# Patient Record
Sex: MALE | Race: WHITE | NOT HISPANIC OR LATINO | Employment: OTHER | URBAN - METROPOLITAN AREA
[De-identification: names, ages, dates, MRNs, and addresses within clinical notes are randomized per-mention and may not be internally consistent; named-entity substitution may affect disease eponyms.]

---

## 2017-05-01 ENCOUNTER — HISTORICAL (OUTPATIENT)
Dept: LAB | Facility: HOSPITAL | Age: 67
End: 2017-05-01

## 2017-05-01 LAB
ABS NEUT (OLG): 4.74 X10(3)/MCL (ref 2.1–9.2)
ALBUMIN SERPL-MCNC: 4.3 GM/DL (ref 3.4–5)
ALBUMIN/GLOB SERPL: 1.7 {RATIO}
ALP SERPL-CCNC: 81 UNIT/L (ref 50–136)
ALT SERPL-CCNC: 32 UNIT/L (ref 12–78)
AST SERPL-CCNC: 21 UNIT/L (ref 15–37)
BASOPHILS # BLD AUTO: 0.1 X10(3)/MCL (ref 0–0.2)
BASOPHILS NFR BLD AUTO: 1 %
BILIRUB SERPL-MCNC: 0.6 MG/DL (ref 0.2–1)
BILIRUBIN DIRECT+TOT PNL SERPL-MCNC: 0.1 MG/DL (ref 0–0.2)
BILIRUBIN DIRECT+TOT PNL SERPL-MCNC: 0.5 MG/DL (ref 0–0.8)
BUN SERPL-MCNC: 11 MG/DL (ref 7–18)
CALCIUM SERPL-MCNC: 9.3 MG/DL (ref 8.5–10.1)
CHLORIDE SERPL-SCNC: 105 MMOL/L (ref 98–107)
CHOLEST SERPL-MCNC: 125 MG/DL (ref 0–200)
CHOLEST/HDLC SERPL: 2.4 {RATIO} (ref 0–5)
CO2 SERPL-SCNC: 28 MMOL/L (ref 21–32)
CREAT SERPL-MCNC: 1.06 MG/DL (ref 0.7–1.3)
EOSINOPHIL # BLD AUTO: 0 X10(3)/MCL (ref 0–0.9)
EOSINOPHIL NFR BLD AUTO: 0 %
ERYTHROCYTE [DISTWIDTH] IN BLOOD BY AUTOMATED COUNT: 13 % (ref 11.5–17)
GLOBULIN SER-MCNC: 2.5 GM/DL (ref 2.4–3.5)
GLUCOSE SERPL-MCNC: 92 MG/DL (ref 74–106)
HCT VFR BLD AUTO: 44.8 % (ref 42–52)
HDLC SERPL-MCNC: 52 MG/DL (ref 35–60)
HGB BLD-MCNC: 13.8 GM/DL (ref 14–18)
LDLC SERPL CALC-MCNC: 55 MG/DL (ref 0–129)
LYMPHOCYTES # BLD AUTO: 0.7 X10(3)/MCL (ref 0.6–4.6)
LYMPHOCYTES NFR BLD AUTO: 11 %
MCH RBC QN AUTO: 29.9 PG (ref 27–31)
MCHC RBC AUTO-ENTMCNC: 30.8 GM/DL (ref 33–36)
MCV RBC AUTO: 97 FL (ref 80–94)
MONOCYTES # BLD AUTO: 0.6 X10(3)/MCL (ref 0.1–1.3)
MONOCYTES NFR BLD AUTO: 10 %
NEUTROPHILS # BLD AUTO: 4.74 X10(3)/MCL (ref 2.1–9.2)
NEUTROPHILS NFR BLD AUTO: 77 %
PLATELET # BLD AUTO: 115 X10(3)/MCL (ref 130–400)
PMV BLD AUTO: 12.3 FL (ref 9.4–12.4)
POTASSIUM SERPL-SCNC: 4.6 MMOL/L (ref 3.5–5.1)
PROT SERPL-MCNC: 6.8 GM/DL (ref 6.4–8.2)
RBC # BLD AUTO: 4.62 X10(6)/MCL (ref 4.7–6.1)
SODIUM SERPL-SCNC: 139 MMOL/L (ref 136–145)
TRIGL SERPL-MCNC: 88 MG/DL (ref 30–150)
TSH SERPL-ACNC: 0.84 MIU/ML (ref 0.36–3.74)
VLDLC SERPL CALC-MCNC: 18 MG/DL
WBC # SPEC AUTO: 6.1 X10(3)/MCL (ref 4.5–11.5)

## 2017-11-09 ENCOUNTER — HISTORICAL (OUTPATIENT)
Dept: LAB | Facility: HOSPITAL | Age: 67
End: 2017-11-09

## 2017-11-09 LAB
ABS NEUT (OLG): 4.26 X10(3)/MCL (ref 2.1–9.2)
ALBUMIN SERPL-MCNC: 4.3 GM/DL (ref 3.4–5)
ALBUMIN/GLOB SERPL: 1.4 RATIO (ref 1.1–2)
ALP SERPL-CCNC: 76 UNIT/L (ref 50–136)
ALT SERPL-CCNC: 38 UNIT/L (ref 12–78)
APPEARANCE, UA: CLEAR
AST SERPL-CCNC: 23 UNIT/L (ref 15–37)
BACTERIA SPEC CULT: NORMAL /HPF
BASOPHILS # BLD AUTO: 0.1 X10(3)/MCL (ref 0–0.2)
BASOPHILS NFR BLD AUTO: 1 %
BILIRUB SERPL-MCNC: 0.6 MG/DL (ref 0.2–1)
BILIRUB UR QL STRIP: NEGATIVE
BILIRUBIN DIRECT+TOT PNL SERPL-MCNC: 0.2 MG/DL (ref 0–0.5)
BILIRUBIN DIRECT+TOT PNL SERPL-MCNC: 0.4 MG/DL (ref 0–0.8)
BUN SERPL-MCNC: 13 MG/DL (ref 7–18)
CALCIUM SERPL-MCNC: 9.3 MG/DL (ref 8.5–10.1)
CHLORIDE SERPL-SCNC: 98 MMOL/L (ref 98–107)
CHOLEST SERPL-MCNC: 123 MG/DL (ref 0–200)
CHOLEST/HDLC SERPL: 2 {RATIO} (ref 0–5)
CO2 SERPL-SCNC: 27 MMOL/L (ref 21–32)
COLOR UR: YELLOW
CREAT SERPL-MCNC: 1.08 MG/DL (ref 0.7–1.3)
EOSINOPHIL # BLD AUTO: 0 X10(3)/MCL (ref 0–0.9)
EOSINOPHIL NFR BLD AUTO: 1 %
ERYTHROCYTE [DISTWIDTH] IN BLOOD BY AUTOMATED COUNT: 12.9 % (ref 11.5–17)
GLOBULIN SER-MCNC: 3.1 GM/DL (ref 2.4–3.5)
GLUCOSE (UA): NEGATIVE
GLUCOSE SERPL-MCNC: 97 MG/DL (ref 74–106)
HCT VFR BLD AUTO: 43 % (ref 42–52)
HDLC SERPL-MCNC: 61 MG/DL (ref 35–60)
HGB BLD-MCNC: 13.9 GM/DL (ref 14–18)
HGB UR QL STRIP: NEGATIVE
KETONES UR QL STRIP: NEGATIVE
LDLC SERPL CALC-MCNC: 50 MG/DL (ref 0–129)
LEUKOCYTE ESTERASE UR QL STRIP: NEGATIVE
LYMPHOCYTES # BLD AUTO: 0.9 X10(3)/MCL (ref 0.6–4.6)
LYMPHOCYTES NFR BLD AUTO: 15 %
MCH RBC QN AUTO: 29.6 PG (ref 27–31)
MCHC RBC AUTO-ENTMCNC: 32.3 GM/DL (ref 33–36)
MCV RBC AUTO: 91.7 FL (ref 80–94)
MONOCYTES # BLD AUTO: 0.8 X10(3)/MCL (ref 0.1–1.3)
MONOCYTES NFR BLD AUTO: 13 %
NEUTROPHILS # BLD AUTO: 4.26 X10(3)/MCL (ref 2.1–9.2)
NEUTROPHILS NFR BLD AUTO: 70 %
NITRITE UR QL STRIP: NEGATIVE
PH UR STRIP: 6.5 [PH] (ref 5–9)
PLATELET # BLD AUTO: 264 X10(3)/MCL (ref 130–400)
PMV BLD AUTO: 10.6 FL (ref 9.4–12.4)
POTASSIUM SERPL-SCNC: 5 MMOL/L (ref 3.5–5.1)
PROT SERPL-MCNC: 7.4 GM/DL (ref 6.4–8.2)
PROT UR QL STRIP: NEGATIVE
PSA SERPL-MCNC: 1.36 NG/ML (ref 0–4)
RBC # BLD AUTO: 4.69 X10(6)/MCL (ref 4.7–6.1)
RBC #/AREA URNS HPF: NORMAL /[HPF]
SODIUM SERPL-SCNC: 134 MMOL/L (ref 136–145)
SP GR UR STRIP: 1.01 (ref 1–1.03)
SQUAMOUS EPITHELIAL, UA: NORMAL
TRIGL SERPL-MCNC: 59 MG/DL (ref 30–150)
TSH SERPL-ACNC: 1.41 MIU/ML (ref 0.36–3.74)
UA WBC MAN: NORMAL
UROBILINOGEN UR STRIP-ACNC: 0.2
VLDLC SERPL CALC-MCNC: 12 MG/DL
WBC # SPEC AUTO: 6.1 X10(3)/MCL (ref 4.5–11.5)
WBC #/AREA URNS HPF: NORMAL /[HPF]

## 2018-05-08 LAB
CHOLEST SERPL-MCNC: 138 MG/DL (ref 0–200)
CHOLEST/HDLC SERPL: 2.9 {RATIO} (ref 0–5)
HDLC SERPL-MCNC: 47 MG/DL (ref 35–60)
LDLC SERPL CALC-MCNC: 71 MG/DL (ref 0–129)
TRIGL SERPL-MCNC: 101 MG/DL (ref 30–150)
VLDLC SERPL CALC-MCNC: 20 MG/DL

## 2018-05-09 ENCOUNTER — HISTORICAL (OUTPATIENT)
Dept: LAB | Facility: HOSPITAL | Age: 68
End: 2018-05-09

## 2018-05-09 LAB
ALBUMIN SERPL-MCNC: 4.1 GM/DL (ref 3.4–5)
ALBUMIN/GLOB SERPL: 1.4 RATIO (ref 1.1–2)
ALP SERPL-CCNC: 76 UNIT/L (ref 50–136)
ALT SERPL-CCNC: 39 UNIT/L (ref 12–78)
AST SERPL-CCNC: 29 UNIT/L (ref 15–37)
BILIRUB SERPL-MCNC: 0.7 MG/DL (ref 0.2–1)
BILIRUBIN DIRECT+TOT PNL SERPL-MCNC: 0.1 MG/DL (ref 0–0.5)
BILIRUBIN DIRECT+TOT PNL SERPL-MCNC: 0.6 MG/DL (ref 0–0.8)
BUN SERPL-MCNC: 12 MG/DL (ref 7–18)
CALCIUM SERPL-MCNC: 9.5 MG/DL (ref 8.5–10.1)
CHLORIDE SERPL-SCNC: 103 MMOL/L (ref 98–107)
CO2 SERPL-SCNC: 28 MMOL/L (ref 21–32)
CREAT SERPL-MCNC: 1.12 MG/DL (ref 0.7–1.3)
EST. AVERAGE GLUCOSE BLD GHB EST-MCNC: 114 MG/DL
GLOBULIN SER-MCNC: 2.9 GM/DL (ref 2.4–3.5)
GLUCOSE SERPL-MCNC: 85 MG/DL (ref 74–106)
HBA1C MFR BLD: 5.6 % (ref 4.2–6.3)
POTASSIUM SERPL-SCNC: 4.4 MMOL/L (ref 3.5–5.1)
PROT SERPL-MCNC: 7 GM/DL (ref 6.4–8.2)
SODIUM SERPL-SCNC: 137 MMOL/L (ref 136–145)
TSH SERPL-ACNC: 1.12 MIU/L (ref 0.36–3.74)

## 2018-05-28 ENCOUNTER — HISTORICAL (OUTPATIENT)
Dept: ADMINISTRATIVE | Facility: HOSPITAL | Age: 68
End: 2018-05-28

## 2018-05-28 LAB — RAPID GROUP A STREP (OHS): NEGATIVE

## 2018-11-16 ENCOUNTER — HISTORICAL (OUTPATIENT)
Dept: LAB | Facility: HOSPITAL | Age: 68
End: 2018-11-16

## 2018-11-16 LAB
ABS NEUT (OLG): 3.37 X10(3)/MCL (ref 2.1–9.2)
ALBUMIN SERPL-MCNC: 4.1 GM/DL (ref 3.4–5)
ALBUMIN/GLOB SERPL: 1.5 {RATIO}
ALP SERPL-CCNC: 85 UNIT/L (ref 50–136)
ALT SERPL-CCNC: 47 UNIT/L (ref 12–78)
APPEARANCE, UA: CLEAR
AST SERPL-CCNC: 34 UNIT/L (ref 15–37)
BACTERIA SPEC CULT: NORMAL /HPF
BASOPHILS # BLD AUTO: 0.1 X10(3)/MCL (ref 0–0.2)
BASOPHILS NFR BLD AUTO: 1 %
BILIRUB SERPL-MCNC: 1 MG/DL (ref 0.2–1)
BILIRUB UR QL STRIP: NEGATIVE
BILIRUBIN DIRECT+TOT PNL SERPL-MCNC: 0.2 MG/DL (ref 0–0.2)
BILIRUBIN DIRECT+TOT PNL SERPL-MCNC: 0.8 MG/DL (ref 0–0.8)
BUN SERPL-MCNC: 10 MG/DL (ref 7–18)
CALCIUM SERPL-MCNC: 8.9 MG/DL (ref 8.5–10.1)
CHLORIDE SERPL-SCNC: 97 MMOL/L (ref 98–107)
CHOLEST SERPL-MCNC: 138 MG/DL (ref 0–200)
CHOLEST/HDLC SERPL: 2.9 {RATIO} (ref 0–5)
CO2 SERPL-SCNC: 27 MMOL/L (ref 21–32)
COLOR UR: YELLOW
CREAT SERPL-MCNC: 1.08 MG/DL (ref 0.7–1.3)
EOSINOPHIL # BLD AUTO: 0 X10(3)/MCL (ref 0–0.9)
EOSINOPHIL NFR BLD AUTO: 1 %
ERYTHROCYTE [DISTWIDTH] IN BLOOD BY AUTOMATED COUNT: 12.4 % (ref 11.5–17)
GLOBULIN SER-MCNC: 2.8 GM/DL (ref 2.4–3.5)
GLUCOSE (UA): NEGATIVE
GLUCOSE SERPL-MCNC: 86 MG/DL (ref 74–106)
HCT VFR BLD AUTO: 43.5 % (ref 42–52)
HDLC SERPL-MCNC: 48 MG/DL (ref 35–60)
HGB BLD-MCNC: 13.9 GM/DL (ref 14–18)
HGB UR QL STRIP: NEGATIVE
KETONES UR QL STRIP: NEGATIVE
LDLC SERPL CALC-MCNC: 74 MG/DL (ref 0–129)
LEUKOCYTE ESTERASE UR QL STRIP: NEGATIVE
LYMPHOCYTES # BLD AUTO: 1 X10(3)/MCL (ref 0.6–4.6)
LYMPHOCYTES NFR BLD AUTO: 20 %
MCH RBC QN AUTO: 29.1 PG (ref 27–31)
MCHC RBC AUTO-ENTMCNC: 32 GM/DL (ref 33–36)
MCV RBC AUTO: 91 FL (ref 80–94)
MONOCYTES # BLD AUTO: 0.8 X10(3)/MCL (ref 0.1–1.3)
MONOCYTES NFR BLD AUTO: 14 %
NEUTROPHILS # BLD AUTO: 3.37 X10(3)/MCL (ref 2.1–9.2)
NEUTROPHILS NFR BLD AUTO: 64 %
NITRITE UR QL STRIP: NEGATIVE
PH UR STRIP: 7.5 [PH] (ref 5–9)
PLATELET # BLD AUTO: 272 X10(3)/MCL (ref 130–400)
PMV BLD AUTO: 10.2 FL (ref 9.4–12.4)
POTASSIUM SERPL-SCNC: 4.5 MMOL/L (ref 3.5–5.1)
PROT SERPL-MCNC: 6.9 GM/DL (ref 6.4–8.2)
PROT UR QL STRIP: NEGATIVE
PSA SERPL-MCNC: 1.75 NG/ML (ref 0–4)
RBC # BLD AUTO: 4.78 X10(6)/MCL (ref 4.7–6.1)
RBC #/AREA URNS HPF: NORMAL /[HPF]
SODIUM SERPL-SCNC: 131 MMOL/L (ref 136–145)
SP GR UR STRIP: 1 (ref 1–1.03)
SQUAMOUS EPITHELIAL, UA: NORMAL
T PALLIDUM AB SER QL: NORMAL
TRIGL SERPL-MCNC: 80 MG/DL (ref 30–150)
TSH SERPL-ACNC: 1.55 MIU/L (ref 0.36–3.74)
UROBILINOGEN UR STRIP-ACNC: 0.2
VIT B12 SERPL-MCNC: 912 PG/ML (ref 193–986)
VLDLC SERPL CALC-MCNC: 16 MG/DL
WBC # SPEC AUTO: 5.3 X10(3)/MCL (ref 4.5–11.5)
WBC #/AREA URNS HPF: NORMAL /HPF

## 2019-05-17 ENCOUNTER — HISTORICAL (OUTPATIENT)
Dept: LAB | Facility: HOSPITAL | Age: 69
End: 2019-05-17

## 2019-05-17 LAB
ALBUMIN SERPL-MCNC: 4.2 GM/DL (ref 3.4–5)
ALBUMIN/GLOB SERPL: 1.4 RATIO (ref 1.1–2)
ALP SERPL-CCNC: 91 UNIT/L (ref 50–136)
ALT SERPL-CCNC: 35 UNIT/L (ref 12–78)
AST SERPL-CCNC: 24 UNIT/L (ref 15–37)
BILIRUB SERPL-MCNC: 0.8 MG/DL (ref 0.2–1)
BILIRUBIN DIRECT+TOT PNL SERPL-MCNC: 0.2 MG/DL (ref 0–0.5)
BILIRUBIN DIRECT+TOT PNL SERPL-MCNC: 0.6 MG/DL (ref 0–0.8)
BUN SERPL-MCNC: 12 MG/DL (ref 7–18)
CALCIUM SERPL-MCNC: 9.2 MG/DL (ref 8.5–10.1)
CHLORIDE SERPL-SCNC: 98 MMOL/L (ref 98–107)
CHOLEST SERPL-MCNC: 128 MG/DL (ref 0–200)
CHOLEST/HDLC SERPL: 2.6 {RATIO} (ref 0–5)
CO2 SERPL-SCNC: 29 MMOL/L (ref 21–32)
CREAT SERPL-MCNC: 1.01 MG/DL (ref 0.7–1.3)
GLOBULIN SER-MCNC: 2.9 GM/DL (ref 2.4–3.5)
GLUCOSE SERPL-MCNC: 95 MG/DL (ref 74–106)
HDLC SERPL-MCNC: 49 MG/DL (ref 35–60)
LDLC SERPL CALC-MCNC: 65 MG/DL (ref 0–129)
POTASSIUM SERPL-SCNC: 4.4 MMOL/L (ref 3.5–5.1)
PROT SERPL-MCNC: 7.1 GM/DL (ref 6.4–8.2)
SODIUM SERPL-SCNC: 132 MMOL/L (ref 136–145)
TRIGL SERPL-MCNC: 69 MG/DL (ref 30–150)
TSH SERPL-ACNC: 1.58 MIU/L (ref 0.36–3.74)
VLDLC SERPL CALC-MCNC: 14 MG/DL

## 2019-11-20 ENCOUNTER — HISTORICAL (OUTPATIENT)
Dept: LAB | Facility: HOSPITAL | Age: 69
End: 2019-11-20

## 2019-11-20 LAB
ABS NEUT (OLG): 3.72 X10(3)/MCL (ref 2.1–9.2)
ALBUMIN SERPL-MCNC: 4.2 GM/DL (ref 3.4–5)
ALBUMIN/GLOB SERPL: 1.6 {RATIO}
ALP SERPL-CCNC: 79 UNIT/L (ref 50–136)
ALT SERPL-CCNC: 33 UNIT/L (ref 12–78)
APPEARANCE, UA: CLEAR
AST SERPL-CCNC: 19 UNIT/L (ref 15–37)
BACTERIA SPEC CULT: NORMAL /HPF
BASOPHILS # BLD AUTO: 0.1 X10(3)/MCL (ref 0–0.2)
BASOPHILS NFR BLD AUTO: 1 %
BILIRUB SERPL-MCNC: 0.8 MG/DL (ref 0.2–1)
BILIRUB UR QL STRIP: NEGATIVE
BILIRUBIN DIRECT+TOT PNL SERPL-MCNC: 0.1 MG/DL (ref 0–0.2)
BILIRUBIN DIRECT+TOT PNL SERPL-MCNC: 0.7 MG/DL (ref 0–0.8)
BUN SERPL-MCNC: 15 MG/DL (ref 7–18)
CALCIUM SERPL-MCNC: 9.6 MG/DL (ref 8.5–10.1)
CHLORIDE SERPL-SCNC: 103 MMOL/L (ref 98–107)
CHOLEST SERPL-MCNC: 145 MG/DL (ref 0–200)
CHOLEST/HDLC SERPL: 2.3 {RATIO} (ref 0–5)
CO2 SERPL-SCNC: 28 MMOL/L (ref 21–32)
COLOR UR: YELLOW
CREAT SERPL-MCNC: 1.06 MG/DL (ref 0.7–1.3)
EOSINOPHIL # BLD AUTO: 0 X10(3)/MCL (ref 0–0.9)
EOSINOPHIL NFR BLD AUTO: 0 %
ERYTHROCYTE [DISTWIDTH] IN BLOOD BY AUTOMATED COUNT: 12.8 % (ref 11.5–17)
GLOBULIN SER-MCNC: 2.6 GM/DL (ref 2.4–3.5)
GLUCOSE (UA): NEGATIVE
GLUCOSE SERPL-MCNC: 102 MG/DL (ref 74–106)
HCT VFR BLD AUTO: 46.8 % (ref 42–52)
HDLC SERPL-MCNC: 62 MG/DL (ref 35–60)
HGB BLD-MCNC: 14.4 GM/DL (ref 14–18)
HGB UR QL STRIP: NEGATIVE
KETONES UR QL STRIP: NEGATIVE
LDLC SERPL CALC-MCNC: 71 MG/DL (ref 0–129)
LEUKOCYTE ESTERASE UR QL STRIP: NEGATIVE
LYMPHOCYTES # BLD AUTO: 0.9 X10(3)/MCL (ref 0.6–4.6)
LYMPHOCYTES NFR BLD AUTO: 16 %
MCH RBC QN AUTO: 28.6 PG (ref 27–31)
MCHC RBC AUTO-ENTMCNC: 30.8 GM/DL (ref 33–36)
MCV RBC AUTO: 92.9 FL (ref 80–94)
MONOCYTES # BLD AUTO: 0.7 X10(3)/MCL (ref 0.1–1.3)
MONOCYTES NFR BLD AUTO: 13 %
NEUTROPHILS # BLD AUTO: 3.72 X10(3)/MCL (ref 2.1–9.2)
NEUTROPHILS NFR BLD AUTO: 69 %
NITRITE UR QL STRIP: NEGATIVE
PH UR STRIP: 6 [PH] (ref 5–9)
PLATELET # BLD AUTO: 290 X10(3)/MCL (ref 130–400)
PMV BLD AUTO: 9.8 FL (ref 9.4–12.4)
POTASSIUM SERPL-SCNC: 4.9 MMOL/L (ref 3.5–5.1)
PROT SERPL-MCNC: 6.8 GM/DL (ref 6.4–8.2)
PROT UR QL STRIP: NEGATIVE
PSA SERPL-MCNC: 2.07 NG/ML (ref 0–4)
RBC # BLD AUTO: 5.04 X10(6)/MCL (ref 4.7–6.1)
RBC #/AREA URNS HPF: NORMAL /[HPF]
SODIUM SERPL-SCNC: 136 MMOL/L (ref 136–145)
SP GR UR STRIP: 1.01 (ref 1–1.03)
SQUAMOUS EPITHELIAL, UA: NORMAL
TRIGL SERPL-MCNC: 60 MG/DL (ref 30–150)
TSH SERPL-ACNC: 2.08 MIU/L (ref 0.36–3.74)
UROBILINOGEN UR STRIP-ACNC: 0.2
VLDLC SERPL CALC-MCNC: 12 MG/DL
WBC # SPEC AUTO: 5.4 X10(3)/MCL (ref 4.5–11.5)
WBC #/AREA URNS HPF: NORMAL /HPF

## 2020-11-17 ENCOUNTER — HISTORICAL (OUTPATIENT)
Dept: ADMINISTRATIVE | Facility: HOSPITAL | Age: 70
End: 2020-11-17

## 2020-11-17 LAB
ABS NEUT (OLG): 2.51 X10(3)/MCL (ref 2.1–9.2)
ALBUMIN SERPL-MCNC: 4 GM/DL (ref 3.4–4.8)
ALBUMIN/GLOB SERPL: 1.7 RATIO (ref 1.1–2)
ALP SERPL-CCNC: 78 UNIT/L (ref 40–150)
ALT SERPL-CCNC: 15 UNIT/L (ref 0–55)
APPEARANCE, UA: CLEAR
AST SERPL-CCNC: 17 UNIT/L (ref 5–34)
BACTERIA SPEC CULT: NORMAL /HPF
BASOPHILS # BLD AUTO: 0 X10(3)/MCL (ref 0–0.2)
BASOPHILS NFR BLD AUTO: 1 %
BILIRUB SERPL-MCNC: 0.4 MG/DL
BILIRUB UR QL STRIP: NEGATIVE
BILIRUBIN DIRECT+TOT PNL SERPL-MCNC: 0.2 MG/DL (ref 0–0.5)
BILIRUBIN DIRECT+TOT PNL SERPL-MCNC: 0.2 MG/DL (ref 0–0.8)
BUN SERPL-MCNC: 13.1 MG/DL (ref 8.4–25.7)
CALCIUM SERPL-MCNC: 9.1 MG/DL (ref 8.8–10)
CHLORIDE SERPL-SCNC: 104 MMOL/L (ref 98–107)
CHOLEST SERPL-MCNC: 129 MG/DL
CHOLEST/HDLC SERPL: 3 {RATIO} (ref 0–5)
CO2 SERPL-SCNC: 26 MMOL/L (ref 23–31)
COLOR UR: YELLOW
CREAT SERPL-MCNC: 1.02 MG/DL (ref 0.73–1.18)
EOSINOPHIL # BLD AUTO: 0 X10(3)/MCL (ref 0–0.9)
EOSINOPHIL NFR BLD AUTO: 1 %
ERYTHROCYTE [DISTWIDTH] IN BLOOD BY AUTOMATED COUNT: 12.3 % (ref 11.5–17)
GLOBULIN SER-MCNC: 2.4 GM/DL (ref 2.4–3.5)
GLUCOSE (UA): NEGATIVE
GLUCOSE SERPL-MCNC: 109 MG/DL (ref 82–115)
HCT VFR BLD AUTO: 42.4 % (ref 42–52)
HCV AB SERPL QL IA: NONREACTIVE
HDLC SERPL-MCNC: 49 MG/DL (ref 35–60)
HGB BLD-MCNC: 13.5 GM/DL (ref 14–18)
HGB UR QL STRIP: NEGATIVE
KETONES UR QL STRIP: NEGATIVE
LDLC SERPL CALC-MCNC: 71 MG/DL (ref 50–140)
LEUKOCYTE ESTERASE UR QL STRIP: NEGATIVE
LYMPHOCYTES # BLD AUTO: 0.6 X10(3)/MCL (ref 0.6–4.6)
LYMPHOCYTES NFR BLD AUTO: 16 %
MCH RBC QN AUTO: 28.7 PG (ref 27–31)
MCHC RBC AUTO-ENTMCNC: 31.8 GM/DL (ref 33–36)
MCV RBC AUTO: 90.2 FL (ref 80–94)
MONOCYTES # BLD AUTO: 0.6 X10(3)/MCL (ref 0.1–1.3)
MONOCYTES NFR BLD AUTO: 15 %
NEUTROPHILS # BLD AUTO: 2.51 X10(3)/MCL (ref 2.1–9.2)
NEUTROPHILS NFR BLD AUTO: 67 %
NITRITE UR QL STRIP: NEGATIVE
PH UR STRIP: 5.5 [PH] (ref 5–9)
PLATELET # BLD AUTO: 294 X10(3)/MCL (ref 130–400)
PMV BLD AUTO: 10 FL (ref 9.4–12.4)
POTASSIUM SERPL-SCNC: 4.9 MMOL/L (ref 3.5–5.1)
PROT SERPL-MCNC: 6.4 GM/DL (ref 5.8–7.6)
PROT UR QL STRIP: NEGATIVE
PSA SERPL-MCNC: 1.52 NG/ML
RBC # BLD AUTO: 4.7 X10(6)/MCL (ref 4.7–6.1)
RBC #/AREA URNS HPF: NORMAL /[HPF]
SODIUM SERPL-SCNC: 137 MMOL/L (ref 136–145)
SP GR UR STRIP: 1.02 (ref 1–1.03)
SQUAMOUS EPITHELIAL, UA: NORMAL
TRIGL SERPL-MCNC: 46 MG/DL (ref 34–140)
TSH SERPL-ACNC: 0.97 UIU/ML (ref 0.35–4.94)
UROBILINOGEN UR STRIP-ACNC: 0.2
VLDLC SERPL CALC-MCNC: 9 MG/DL
WBC # SPEC AUTO: 3.8 X10(3)/MCL (ref 4.5–11.5)
WBC #/AREA URNS HPF: NORMAL /HPF

## 2022-04-10 ENCOUNTER — HISTORICAL (OUTPATIENT)
Dept: ADMINISTRATIVE | Facility: HOSPITAL | Age: 72
End: 2022-04-10

## 2022-04-25 VITALS
OXYGEN SATURATION: 98 % | WEIGHT: 152.13 LBS | SYSTOLIC BLOOD PRESSURE: 170 MMHG | HEIGHT: 66 IN | DIASTOLIC BLOOD PRESSURE: 110 MMHG | BODY MASS INDEX: 24.45 KG/M2

## 2022-09-22 ENCOUNTER — TELEPHONE (OUTPATIENT)
Dept: NEUROLOGY | Facility: CLINIC | Age: 72
End: 2022-09-22
Payer: MEDICARE

## 2022-09-22 NOTE — TELEPHONE ENCOUNTER
----- Message from Sharon Gordon sent at 9/22/2022 10:20 AM CDT -----  Contact: @ [phone]  Pt daughter is calling to make an appointment for memory disorders no date available please call and adv @ [phone]

## 2022-09-26 ENCOUNTER — PATIENT OUTREACH (OUTPATIENT)
Dept: ADMINISTRATIVE | Facility: HOSPITAL | Age: 72
End: 2022-09-26
Payer: MEDICARE

## 2022-09-26 NOTE — PROGRESS NOTES
[facility] CMS chart audits-PCP VISIT/ANNUAL WELLNESS VISIT. Chart review completed for HM test overdue (mammograms, Colonoscopies, pap smears, DM labs, and/or EYE EXAMs)      Care Everywhere and media, updates requested and reviewed.      RE:  Patient needs PCP Visit-2022    Attempted to call patient. No answer, could not leave voice mail.

## 2022-10-03 ENCOUNTER — TELEPHONE (OUTPATIENT)
Dept: NEUROLOGY | Facility: CLINIC | Age: 72
End: 2022-10-03
Payer: MEDICARE

## 2022-10-06 ENCOUNTER — TELEPHONE (OUTPATIENT)
Dept: NEUROLOGY | Facility: CLINIC | Age: 72
End: 2022-10-06
Payer: MEDICARE

## 2022-10-06 NOTE — TELEPHONE ENCOUNTER
----- Message from Marisol Katz sent at 10/6/2022  8:17 AM CDT -----  Regarding: appt-returning missed call from [facility]  Contact: Laura (daughter) @ [phone]  Caller, Laura (daughter) is returning a missed call from [facility] in the office. Daughter states that pt is desperate for an appt and would like to have pt seen very soon. Please call. Thanks.

## 2022-10-23 ENCOUNTER — HOSPITAL ENCOUNTER (EMERGENCY)
Facility: HOSPITAL | Age: 72
Discharge: PSYCHIATRIC OR SUBSTANCE USE FACILITY | End: 2022-10-23
Attending: EMERGENCY MEDICINE
Payer: MEDICARE

## 2022-10-23 VITALS
RESPIRATION RATE: 18 BRPM | HEART RATE: 51 BPM | OXYGEN SATURATION: 100 % | BODY MASS INDEX: 21.97 KG/M2 | WEIGHT: 140 LBS | DIASTOLIC BLOOD PRESSURE: 68 MMHG | HEIGHT: 67 IN | SYSTOLIC BLOOD PRESSURE: 156 MMHG | TEMPERATURE: 99 F

## 2022-10-23 DIAGNOSIS — R00.1 BRADYCARDIA: ICD-10-CM

## 2022-10-23 DIAGNOSIS — F03.918 AGGRESSIVE BEHAVIOR DUE TO DEMENTIA: Primary | ICD-10-CM

## 2022-10-23 LAB
ALBUMIN SERPL BCP-MCNC: 3.5 G/DL (ref 3.5–5.2)
ALP SERPL-CCNC: 86 U/L (ref 55–135)
ALT SERPL W/O P-5'-P-CCNC: 28 U/L (ref 10–44)
AMPHET+METHAMPHET UR QL: NEGATIVE
ANION GAP SERPL CALC-SCNC: 5 MMOL/L (ref 8–16)
APAP SERPL-MCNC: <2 UG/ML (ref 10–20)
AST SERPL-CCNC: 20 U/L (ref 10–40)
BACTERIA #/AREA URNS HPF: NEGATIVE /HPF
BARBITURATES UR QL SCN>200 NG/ML: NEGATIVE
BASOPHILS # BLD AUTO: 0.06 K/UL (ref 0–0.2)
BASOPHILS NFR BLD: 1.1 % (ref 0–1.9)
BENZODIAZ UR QL SCN>200 NG/ML: ABNORMAL
BILIRUB SERPL-MCNC: 0.3 MG/DL (ref 0.1–1)
BILIRUB UR QL STRIP: NEGATIVE
BUN SERPL-MCNC: 15 MG/DL (ref 8–23)
BZE UR QL SCN: NEGATIVE
CALCIUM SERPL-MCNC: 9 MG/DL (ref 8.7–10.5)
CANNABINOIDS UR QL SCN: NEGATIVE
CHLORIDE SERPL-SCNC: 109 MMOL/L (ref 95–110)
CLARITY UR: ABNORMAL
CO2 SERPL-SCNC: 28 MMOL/L (ref 23–29)
COLOR UR: YELLOW
CREAT SERPL-MCNC: 1 MG/DL (ref 0.5–1.4)
CREAT UR-MCNC: 171 MG/DL (ref 23–375)
CTP QC/QA: YES
DIFFERENTIAL METHOD: ABNORMAL
EOSINOPHIL # BLD AUTO: 0.1 K/UL (ref 0–0.5)
EOSINOPHIL NFR BLD: 0.9 % (ref 0–8)
ERYTHROCYTE [DISTWIDTH] IN BLOOD BY AUTOMATED COUNT: 12.4 % (ref 11.5–14.5)
EST. GFR  (NO RACE VARIABLE): >60 ML/MIN/1.73 M^2
ETHANOL SERPL-MCNC: <3 MG/DL
GLUCOSE SERPL-MCNC: 101 MG/DL (ref 70–110)
GLUCOSE UR QL STRIP: NEGATIVE
HCT VFR BLD AUTO: 35.5 % (ref 40–54)
HGB BLD-MCNC: 12 G/DL (ref 14–18)
HGB UR QL STRIP: NEGATIVE
HYALINE CASTS #/AREA URNS LPF: 1.9 /LPF
IMM GRANULOCYTES # BLD AUTO: 0.01 K/UL (ref 0–0.04)
IMM GRANULOCYTES NFR BLD AUTO: 0.2 % (ref 0–0.5)
KETONES UR QL STRIP: ABNORMAL
LEUKOCYTE ESTERASE UR QL STRIP: NEGATIVE
LYMPHOCYTES # BLD AUTO: 0.8 K/UL (ref 1–4.8)
LYMPHOCYTES NFR BLD: 15.5 % (ref 18–48)
MCH RBC QN AUTO: 30.1 PG (ref 27–31)
MCHC RBC AUTO-ENTMCNC: 33.8 G/DL (ref 32–36)
MCV RBC AUTO: 89 FL (ref 82–98)
METHADONE UR QL SCN>300 NG/ML: NEGATIVE
MICROSCOPIC COMMENT: ABNORMAL
MONOCYTES # BLD AUTO: 0.6 K/UL (ref 0.3–1)
MONOCYTES NFR BLD: 10.5 % (ref 4–15)
NEUTROPHILS # BLD AUTO: 3.8 K/UL (ref 1.8–7.7)
NEUTROPHILS NFR BLD: 71.8 % (ref 38–73)
NITRITE UR QL STRIP: NEGATIVE
NRBC BLD-RTO: 0 /100 WBC
OPIATES UR QL SCN: NEGATIVE
PCP UR QL SCN>25 NG/ML: NEGATIVE
PH UR STRIP: 7 [PH] (ref 5–8)
PLATELET # BLD AUTO: 250 K/UL (ref 150–450)
PMV BLD AUTO: 9.5 FL (ref 9.2–12.9)
POTASSIUM SERPL-SCNC: 4.2 MMOL/L (ref 3.5–5.1)
PROT SERPL-MCNC: 6.1 G/DL (ref 6–8.4)
PROT UR QL STRIP: NEGATIVE
RBC # BLD AUTO: 3.99 M/UL (ref 4.6–6.2)
RBC #/AREA URNS HPF: 2 /HPF (ref 0–4)
SARS-COV-2 RDRP RESP QL NAA+PROBE: NEGATIVE
SODIUM SERPL-SCNC: 142 MMOL/L (ref 136–145)
SP GR UR STRIP: 1.02 (ref 1–1.03)
SQUAMOUS #/AREA URNS HPF: 1 /HPF
TOXICOLOGY INFORMATION: ABNORMAL
TSH SERPL DL<=0.005 MIU/L-ACNC: 0.98 UIU/ML (ref 0.4–4)
URN SPEC COLLECT METH UR: ABNORMAL
UROBILINOGEN UR STRIP-ACNC: 1 EU/DL
WBC # BLD AUTO: 5.34 K/UL (ref 3.9–12.7)
WBC #/AREA URNS HPF: 0 /HPF (ref 0–5)

## 2022-10-23 PROCEDURE — 85025 COMPLETE CBC W/AUTO DIFF WBC: CPT | Performed by: EMERGENCY MEDICINE

## 2022-10-23 PROCEDURE — 96372 THER/PROPH/DIAG INJ SC/IM: CPT | Performed by: EMERGENCY MEDICINE

## 2022-10-23 PROCEDURE — 80143 DRUG ASSAY ACETAMINOPHEN: CPT | Performed by: EMERGENCY MEDICINE

## 2022-10-23 PROCEDURE — 84443 ASSAY THYROID STIM HORMONE: CPT | Performed by: EMERGENCY MEDICINE

## 2022-10-23 PROCEDURE — 63600175 PHARM REV CODE 636 W HCPCS: Performed by: EMERGENCY MEDICINE

## 2022-10-23 PROCEDURE — 93010 ELECTROCARDIOGRAM REPORT: CPT | Mod: ,,, | Performed by: INTERNAL MEDICINE

## 2022-10-23 PROCEDURE — 93005 ELECTROCARDIOGRAM TRACING: CPT

## 2022-10-23 PROCEDURE — 80307 DRUG TEST PRSMV CHEM ANLYZR: CPT | Performed by: EMERGENCY MEDICINE

## 2022-10-23 PROCEDURE — 82077 ASSAY SPEC XCP UR&BREATH IA: CPT | Performed by: EMERGENCY MEDICINE

## 2022-10-23 PROCEDURE — 93010 EKG 12-LEAD: ICD-10-PCS | Mod: ,,, | Performed by: INTERNAL MEDICINE

## 2022-10-23 PROCEDURE — 81000 URINALYSIS NONAUTO W/SCOPE: CPT | Mod: 59 | Performed by: EMERGENCY MEDICINE

## 2022-10-23 PROCEDURE — 36415 COLL VENOUS BLD VENIPUNCTURE: CPT | Performed by: EMERGENCY MEDICINE

## 2022-10-23 PROCEDURE — 87635 SARS-COV-2 COVID-19 AMP PRB: CPT | Performed by: EMERGENCY MEDICINE

## 2022-10-23 PROCEDURE — 99285 EMERGENCY DEPT VISIT HI MDM: CPT

## 2022-10-23 PROCEDURE — 80053 COMPREHEN METABOLIC PANEL: CPT | Performed by: EMERGENCY MEDICINE

## 2022-10-23 PROCEDURE — 25000003 PHARM REV CODE 250: Performed by: EMERGENCY MEDICINE

## 2022-10-23 RX ORDER — ALPRAZOLAM 0.5 MG/1
TABLET, EXTENDED RELEASE ORAL DAILY
Status: ON HOLD | COMMUNITY
End: 2022-11-11

## 2022-10-23 RX ORDER — HALOPERIDOL 5 MG/ML
5 INJECTION INTRAMUSCULAR
Status: COMPLETED | OUTPATIENT
Start: 2022-10-23 | End: 2022-10-23

## 2022-10-23 RX ORDER — AMLODIPINE BESYLATE 10 MG/1
10 TABLET ORAL DAILY
Status: ON HOLD | COMMUNITY
End: 2022-11-11

## 2022-10-23 RX ORDER — LEVOTHYROXINE SODIUM 88 UG/1
88 TABLET ORAL
Status: ON HOLD | COMMUNITY
End: 2022-11-11

## 2022-10-23 RX ORDER — AMLODIPINE BESYLATE 10 MG/1
10 TABLET ORAL
Status: COMPLETED | OUTPATIENT
Start: 2022-10-23 | End: 2022-10-23

## 2022-10-23 RX ORDER — DIPHENHYDRAMINE HYDROCHLORIDE 50 MG/ML
25 INJECTION INTRAMUSCULAR; INTRAVENOUS
Status: COMPLETED | OUTPATIENT
Start: 2022-10-23 | End: 2022-10-23

## 2022-10-23 RX ORDER — FLUOXETINE HYDROCHLORIDE 40 MG/1
40 CAPSULE ORAL DAILY
Status: ON HOLD | COMMUNITY
End: 2022-11-11

## 2022-10-23 RX ORDER — DIAZEPAM 10 MG/2ML
5 INJECTION INTRAMUSCULAR
Status: COMPLETED | OUTPATIENT
Start: 2022-10-23 | End: 2022-10-23

## 2022-10-23 RX ORDER — ATORVASTATIN CALCIUM 40 MG/1
40 TABLET, FILM COATED ORAL DAILY
Status: ON HOLD | COMMUNITY
End: 2022-11-11

## 2022-10-23 RX ORDER — OXCARBAZEPINE 300 MG/1
150 TABLET, FILM COATED ORAL 2 TIMES DAILY
Status: ON HOLD | COMMUNITY
End: 2022-11-11

## 2022-10-23 RX ORDER — ASPIRIN 81 MG/1
81 TABLET ORAL DAILY
Status: ON HOLD | COMMUNITY
End: 2022-11-11

## 2022-10-23 RX ORDER — CHOLECALCIFEROL (VITAMIN D3) 25 MCG
1000 TABLET ORAL DAILY
Status: ON HOLD | COMMUNITY
End: 2022-11-11

## 2022-10-23 RX ORDER — OLANZAPINE 2.5 MG/1
2.5 TABLET ORAL NIGHTLY
Status: ON HOLD | COMMUNITY
End: 2022-11-03 | Stop reason: SINTOL

## 2022-10-23 RX ADMIN — DIAZEPAM 5 MG: 10 INJECTION, SOLUTION INTRAMUSCULAR; INTRAVENOUS at 02:10

## 2022-10-23 RX ADMIN — AMLODIPINE BESYLATE 10 MG: 10 TABLET ORAL at 06:10

## 2022-10-23 RX ADMIN — DIAZEPAM 5 MG: 10 INJECTION, SOLUTION INTRAMUSCULAR; INTRAVENOUS at 08:10

## 2022-10-23 RX ADMIN — DIPHENHYDRAMINE HYDROCHLORIDE 25 MG: 50 INJECTION, SOLUTION INTRAMUSCULAR; INTRAVENOUS at 02:10

## 2022-10-23 RX ADMIN — HALOPERIDOL LACTATE 5 MG: 5 INJECTION, SOLUTION INTRAMUSCULAR at 02:10

## 2022-10-23 RX ADMIN — HALOPERIDOL LACTATE 5 MG: 5 INJECTION, SOLUTION INTRAMUSCULAR at 08:10

## 2022-10-23 NOTE — ED NOTES
Wife states that for about 3 months patient has been declining patient needs help with bathing and eating at times patient can communicate but often times does not follow command. Unable to clearly identify a baseline.

## 2022-10-23 NOTE — ED NOTES
Pt resting comfortably, no outburst noted at this time. Spouse and daughter remain at bedside due to pt given Ketamine by EMS PTA. 1:1 continues.

## 2022-10-23 NOTE — ED NOTES
Pt's family when notified pt was accepted at [facility] in [place] refused transfer and kept insisting that pt has a bed at [facility] and is already accepted there. I explained to family that we must go through PCP. The family previously spoke with Vickie at [facility] about pt. I called [facility], spoke with Tiana at [phone] and is requesting a pt packet. I then called PCP and updated the [facility].

## 2022-10-23 NOTE — ED PROVIDER NOTES
Encounter Date: 10/23/2022       History     Chief Complaint   Patient presents with    Altered Mental Status     EMS report pt from home with c/o having a Dementia episode where he was fighting and paranoid. [facility] in [place], [place] has a geriatric bed for him, as reported by his spouse and son.     71-year-old male with a history of frontotemporal lobe dementia, progressively getting worse over the past many years, difficultly worse over the past few months, brought in by EMS after becoming combative outside, yelling, paranoid, wall to eat because he thinks he is being poison.  This is a chronic issue progressively getting worse.  Has a geriatric bed at [facility] arranged for him per family    Review of patient's allergies indicates:  No Known Allergies  Past Medical History:   Diagnosis Date    Cancer     Dementia     Depression     Hypertension      Past Surgical History:   Procedure Laterality Date    Lymph node Ca removed       No family history on file.     Review of Systems   Constitutional:  Negative for fever.   HENT:  Negative for sore throat.    Respiratory:  Negative for shortness of breath.    Cardiovascular:  Negative for chest pain.   Gastrointestinal:  Negative for nausea.   Genitourinary:  Negative for dysuria.   Musculoskeletal:  Negative for back pain.   Skin:  Negative for rash.   Neurological:  Negative for weakness.   Hematological:  Does not bruise/bleed easily.   Psychiatric/Behavioral:  Positive for agitation and behavioral problems.    All other systems reviewed and are negative.    Physical Exam     Initial Vitals [10/23/22 1403]   BP Pulse Resp Temp SpO2   (!) 144/67 61 18 98.5 °F (36.9 °C) 97 %      MAP       --         Physical Exam    Nursing note and vitals reviewed.  Constitutional: He appears well-developed and well-nourished. He is not diaphoretic. No distress.   HENT:   Head: Normocephalic and atraumatic.   Eyes: Conjunctivae and EOM are normal. Pupils are equal, round, and reactive  to light. Right eye exhibits no discharge. Left eye exhibits no discharge. No scleral icterus.   Neck: Neck supple. No JVD present.   Normal range of motion.  Cardiovascular:  Normal rate, regular rhythm, normal heart sounds and intact distal pulses.           No murmur heard.  Pulmonary/Chest: Breath sounds normal. No stridor. No respiratory distress. He has no wheezes. He has no rhonchi. He has no rales. He exhibits no tenderness.   Abdominal: Abdomen is soft. Bowel sounds are normal. He exhibits no distension and no mass. There is no abdominal tenderness. There is no rebound and no guarding.   Musculoskeletal:         General: No tenderness or edema. Normal range of motion.      Cervical back: Normal range of motion and neck supple.     Neurological: He is alert and oriented to person, place, and time. He has normal strength. GCS score is 15. GCS eye subscore is 4. GCS verbal subscore is 5. GCS motor subscore is 6.   Skin: Skin is warm and dry. Capillary refill takes less than 2 seconds.   Psychiatric: His mood appears anxious. His speech is rapid and/or pressured. He is agitated and aggressive. He is not actively hallucinating. Thought content is paranoid. Cognition and memory are impaired. He expresses inappropriate judgment. He is inattentive.       ED Course   Procedures  Labs Reviewed   CBC W/ AUTO DIFFERENTIAL - Abnormal; Notable for the following components:       Result Value    RBC 3.99 (*)     Hemoglobin 12.0 (*)     Hematocrit 35.5 (*)     Lymph # 0.8 (*)     Lymph % 15.5 (*)     All other components within normal limits   COMPREHENSIVE METABOLIC PANEL - Abnormal; Notable for the following components:    Anion Gap 5 (*)     All other components within normal limits   ACETAMINOPHEN LEVEL - Abnormal; Notable for the following components:    Acetaminophen (Tylenol), Serum <2.0 (*)     All other components within normal limits   TSH   ALCOHOL,MEDICAL (ETHANOL)   URINALYSIS, REFLEX TO URINE CULTURE   DRUG  SCREEN PANEL, URINE EMERGENCY   URINALYSIS MICROSCOPIC   SARS-COV-2 RDRP GENE    Narrative:     .This test utilizes isothermal nucleic acid amplification   technology to detect the SARS-CoV-2 RdRp nucleic acid segment.   The analytical sensitivity (limit of detection) is 125 genome   equivalents/mL.   A POSITIVE result implies infection with the SARS-CoV-2 virus;   the patient is presumed to be contagious.     A NEGATIVE result means that SARS-CoV-2 nucleic acids are not   present above the limit of detection. A NEGATIVE result should be   treated as presumptive. It does not rule out the possibility of   COVID-19 and should not be the sole basis for treatment decisions.   If COVID-19 is strongly suspected based on clinical and exposure   history, re-testing using an alternate molecular assay should be   considered.   This test is only for use under the Food and Drug   Administration s Emergency Use Authorization (EUA).   Commercial kits are provided by [employer].   Performance characteristics of the EUA have been independently   verified by [facility] Department of   Pathology and Laboratory Medicine.   _________________________________________________________________   The authorized Fact Sheet for Healthcare Providers and the authorized Fact   Sheet for Patients of the ID NOW COVID-19 are available on the FDA   website:     https://www.fda.gov/media/705745/download  https://www.fda.gov/media/186792/download                  Imaging Results    None          Medications   haloperidol lactate injection 5 mg (5 mg Intramuscular Given 10/23/22 1409)   diazePAM injection 5 mg (5 mg Intramuscular Given 10/23/22 1409)   diphenhydrAMINE injection 25 mg (25 mg Intramuscular Given 10/23/22 1409)     Medical Decision Making:   Differential Diagnosis:   Progressive dementia, psychosis                        Clinical Impression:   Final diagnoses:  [F03.918] Aggressive behavior due to dementia (Primary)       ED Disposition Condition    Transfer to Another Facility Stable                John Low MD  10/23/22 8653

## 2022-10-23 NOTE — ED NOTES
Report given to dileep machado charge nurse who has been in direct care with patient since arrival

## 2022-10-23 NOTE — ED NOTES
[facility] is [facility] in [facility] and security notified.

## 2022-10-23 NOTE — ED NOTES
Notified pt's wife and son of the admission update of pt acceptance at [facility]. Pt's wife is requesting he go by ambulance so he can lay down.

## 2022-10-29 ENCOUNTER — HOSPITAL ENCOUNTER (INPATIENT)
Facility: HOSPITAL | Age: 72
LOS: 13 days | Discharge: HOSPICE | DRG: 091 | End: 2022-11-11
Attending: EMERGENCY MEDICINE | Admitting: HOSPITALIST
Payer: MEDICARE

## 2022-10-29 DIAGNOSIS — R41.82 ALTERED MENTAL STATUS: ICD-10-CM

## 2022-10-29 DIAGNOSIS — G31.09 FRONTOTEMPORAL DEMENTIA: ICD-10-CM

## 2022-10-29 DIAGNOSIS — Z71.89 GOALS OF CARE, COUNSELING/DISCUSSION: ICD-10-CM

## 2022-10-29 DIAGNOSIS — R62.7 FAILURE TO THRIVE IN ADULT: ICD-10-CM

## 2022-10-29 DIAGNOSIS — R41.82 ALTERED MENTAL STATUS, UNSPECIFIED ALTERED MENTAL STATUS TYPE: ICD-10-CM

## 2022-10-29 DIAGNOSIS — E86.0 DEHYDRATION: ICD-10-CM

## 2022-10-29 DIAGNOSIS — R07.9 CHEST PAIN: ICD-10-CM

## 2022-10-29 DIAGNOSIS — E44.0 MODERATE MALNUTRITION: ICD-10-CM

## 2022-10-29 DIAGNOSIS — F02.80 FRONTOTEMPORAL DEMENTIA: ICD-10-CM

## 2022-10-29 DIAGNOSIS — F03.918 DEMENTIA WITH BEHAVIORAL DISTURBANCE: ICD-10-CM

## 2022-10-29 DIAGNOSIS — R50.9 FEVER, UNSPECIFIED FEVER CAUSE: ICD-10-CM

## 2022-10-29 DIAGNOSIS — G21.0 NMS (NEUROLEPTIC MALIGNANT SYNDROME): Primary | ICD-10-CM

## 2022-10-29 PROBLEM — I10 ACCELERATED HYPERTENSION: Status: ACTIVE | Noted: 2022-10-29

## 2022-10-29 PROBLEM — E89.0 POSTOPERATIVE HYPOTHYROIDISM: Status: ACTIVE | Noted: 2022-10-29

## 2022-10-29 PROBLEM — D72.825 BANDEMIA: Status: ACTIVE | Noted: 2022-10-29

## 2022-10-29 PROBLEM — E87.0 HYPERNATREMIA: Status: ACTIVE | Noted: 2022-10-29

## 2022-10-29 LAB
ALBUMIN SERPL BCP-MCNC: 3.9 G/DL (ref 3.5–5.2)
ALP SERPL-CCNC: 102 U/L (ref 55–135)
ALT SERPL W/O P-5'-P-CCNC: 42 U/L (ref 10–44)
ANION GAP SERPL CALC-SCNC: 14 MMOL/L (ref 8–16)
AST SERPL-CCNC: 104 U/L (ref 10–40)
BACTERIA #/AREA URNS HPF: ABNORMAL /HPF
BASOPHILS # BLD AUTO: ABNORMAL K/UL (ref 0–0.2)
BASOPHILS NFR BLD: 0 % (ref 0–1.9)
BILIRUB SERPL-MCNC: 0.9 MG/DL (ref 0.1–1)
BILIRUB UR QL STRIP: ABNORMAL
BUN SERPL-MCNC: 42 MG/DL (ref 8–23)
CALCIUM SERPL-MCNC: 10.7 MG/DL (ref 8.7–10.5)
CHLORIDE SERPL-SCNC: 115 MMOL/L (ref 95–110)
CK SERPL-CCNC: 3221 U/L (ref 20–200)
CLARITY UR: CLEAR
CO2 SERPL-SCNC: 24 MMOL/L (ref 23–29)
COLOR UR: YELLOW
CREAT SERPL-MCNC: 1.2 MG/DL (ref 0.5–1.4)
DIFFERENTIAL METHOD: ABNORMAL
EOSINOPHIL # BLD AUTO: ABNORMAL K/UL (ref 0–0.5)
EOSINOPHIL NFR BLD: 0 % (ref 0–8)
ERYTHROCYTE [DISTWIDTH] IN BLOOD BY AUTOMATED COUNT: 13 % (ref 11.5–14.5)
EST. GFR  (NO RACE VARIABLE): >60 ML/MIN/1.73 M^2
GLUCOSE SERPL-MCNC: 107 MG/DL (ref 70–110)
GLUCOSE UR QL STRIP: NEGATIVE
HCT VFR BLD AUTO: 41.9 % (ref 40–54)
HGB BLD-MCNC: 13.7 G/DL (ref 14–18)
HGB UR QL STRIP: ABNORMAL
HYALINE CASTS #/AREA URNS LPF: 0 /LPF
IMM GRANULOCYTES # BLD AUTO: ABNORMAL K/UL (ref 0–0.04)
IMM GRANULOCYTES NFR BLD AUTO: ABNORMAL % (ref 0–0.5)
KETONES UR QL STRIP: ABNORMAL
LACTATE SERPL-SCNC: 1.2 MMOL/L (ref 0.5–2.2)
LEUKOCYTE ESTERASE UR QL STRIP: NEGATIVE
LIPASE SERPL-CCNC: 7 U/L (ref 4–60)
LYMPHOCYTES # BLD AUTO: ABNORMAL K/UL (ref 1–4.8)
LYMPHOCYTES NFR BLD: 3 % (ref 18–48)
MCH RBC QN AUTO: 30.9 PG (ref 27–31)
MCHC RBC AUTO-ENTMCNC: 32.7 G/DL (ref 32–36)
MCV RBC AUTO: 94 FL (ref 82–98)
MICROSCOPIC COMMENT: ABNORMAL
MONOCYTES # BLD AUTO: ABNORMAL K/UL (ref 0.3–1)
MONOCYTES NFR BLD: 10 % (ref 4–15)
NEUTROPHILS NFR BLD: 85 % (ref 38–73)
NEUTS BAND NFR BLD MANUAL: 2 %
NITRITE UR QL STRIP: NEGATIVE
NRBC BLD-RTO: 0 /100 WBC
PH UR STRIP: 6 [PH] (ref 5–8)
PLATELET # BLD AUTO: 274 K/UL (ref 150–450)
PLATELET BLD QL SMEAR: ABNORMAL
PMV BLD AUTO: 10.6 FL (ref 9.2–12.9)
POTASSIUM SERPL-SCNC: 4.3 MMOL/L (ref 3.5–5.1)
PROCALCITONIN SERPL IA-MCNC: 0.31 NG/ML
PROT SERPL-MCNC: 7.2 G/DL (ref 6–8.4)
PROT UR QL STRIP: ABNORMAL
RBC # BLD AUTO: 4.44 M/UL (ref 4.6–6.2)
RBC #/AREA URNS HPF: 8 /HPF (ref 0–4)
SODIUM SERPL-SCNC: 153 MMOL/L (ref 136–145)
SP GR UR STRIP: >=1.03 (ref 1–1.03)
T4 FREE SERPL-MCNC: 0.8 NG/DL (ref 0.71–1.51)
TROPONIN I SERPL DL<=0.01 NG/ML-MCNC: 0.06 NG/ML (ref 0–0.03)
TROPONIN I SERPL DL<=0.01 NG/ML-MCNC: 0.07 NG/ML (ref 0–0.03)
TROPONIN I SERPL DL<=0.01 NG/ML-MCNC: 0.08 NG/ML (ref 0–0.03)
URN SPEC COLLECT METH UR: ABNORMAL
UROBILINOGEN UR STRIP-ACNC: ABNORMAL EU/DL
WBC # BLD AUTO: 17.42 K/UL (ref 3.9–12.7)
WBC #/AREA URNS HPF: 2 /HPF (ref 0–5)

## 2022-10-29 PROCEDURE — 63600175 PHARM REV CODE 636 W HCPCS: Performed by: NURSE PRACTITIONER

## 2022-10-29 PROCEDURE — 36415 COLL VENOUS BLD VENIPUNCTURE: CPT | Performed by: EMERGENCY MEDICINE

## 2022-10-29 PROCEDURE — 80053 COMPREHEN METABOLIC PANEL: CPT | Performed by: EMERGENCY MEDICINE

## 2022-10-29 PROCEDURE — 87040 BLOOD CULTURE FOR BACTERIA: CPT | Performed by: HOSPITALIST

## 2022-10-29 PROCEDURE — 93010 ELECTROCARDIOGRAM REPORT: CPT | Mod: ,,, | Performed by: SPECIALIST

## 2022-10-29 PROCEDURE — 96361 HYDRATE IV INFUSION ADD-ON: CPT

## 2022-10-29 PROCEDURE — 25000003 PHARM REV CODE 250: Performed by: HOSPITALIST

## 2022-10-29 PROCEDURE — 84484 ASSAY OF TROPONIN QUANT: CPT | Mod: 91 | Performed by: EMERGENCY MEDICINE

## 2022-10-29 PROCEDURE — 25000003 PHARM REV CODE 250: Performed by: EMERGENCY MEDICINE

## 2022-10-29 PROCEDURE — 94761 N-INVAS EAR/PLS OXIMETRY MLT: CPT

## 2022-10-29 PROCEDURE — 63600175 PHARM REV CODE 636 W HCPCS: Performed by: EMERGENCY MEDICINE

## 2022-10-29 PROCEDURE — 96374 THER/PROPH/DIAG INJ IV PUSH: CPT

## 2022-10-29 PROCEDURE — S0166 INJ OLANZAPINE 2.5MG: HCPCS | Performed by: EMERGENCY MEDICINE

## 2022-10-29 PROCEDURE — 85007 BL SMEAR W/DIFF WBC COUNT: CPT | Performed by: EMERGENCY MEDICINE

## 2022-10-29 PROCEDURE — 99285 EMERGENCY DEPT VISIT HI MDM: CPT | Mod: 25

## 2022-10-29 PROCEDURE — 84484 ASSAY OF TROPONIN QUANT: CPT | Performed by: HOSPITALIST

## 2022-10-29 PROCEDURE — 63600175 PHARM REV CODE 636 W HCPCS: Performed by: HOSPITALIST

## 2022-10-29 PROCEDURE — 93010 EKG 12-LEAD: ICD-10-PCS | Mod: ,,, | Performed by: SPECIALIST

## 2022-10-29 PROCEDURE — 85027 COMPLETE CBC AUTOMATED: CPT | Performed by: EMERGENCY MEDICINE

## 2022-10-29 PROCEDURE — S5010 5% DEXTROSE AND 0.45% SALINE: HCPCS | Performed by: HOSPITALIST

## 2022-10-29 PROCEDURE — 81000 URINALYSIS NONAUTO W/SCOPE: CPT | Performed by: EMERGENCY MEDICINE

## 2022-10-29 PROCEDURE — 82550 ASSAY OF CK (CPK): CPT | Performed by: HOSPITALIST

## 2022-10-29 PROCEDURE — 93005 ELECTROCARDIOGRAM TRACING: CPT

## 2022-10-29 PROCEDURE — 12000002 HC ACUTE/MED SURGE SEMI-PRIVATE ROOM

## 2022-10-29 PROCEDURE — 36415 COLL VENOUS BLD VENIPUNCTURE: CPT | Performed by: HOSPITALIST

## 2022-10-29 PROCEDURE — 83605 ASSAY OF LACTIC ACID: CPT | Performed by: HOSPITALIST

## 2022-10-29 PROCEDURE — 83690 ASSAY OF LIPASE: CPT | Performed by: HOSPITALIST

## 2022-10-29 PROCEDURE — 84439 ASSAY OF FREE THYROXINE: CPT | Performed by: HOSPITALIST

## 2022-10-29 PROCEDURE — P9612 CATHETERIZE FOR URINE SPEC: HCPCS

## 2022-10-29 PROCEDURE — 96375 TX/PRO/DX INJ NEW DRUG ADDON: CPT

## 2022-10-29 PROCEDURE — 80183 DRUG SCRN QUANT OXCARBAZEPIN: CPT | Performed by: EMERGENCY MEDICINE

## 2022-10-29 PROCEDURE — 84145 PROCALCITONIN (PCT): CPT | Performed by: HOSPITALIST

## 2022-10-29 RX ORDER — DIPHENHYDRAMINE HYDROCHLORIDE 50 MG/ML
25 INJECTION INTRAMUSCULAR; INTRAVENOUS
Status: COMPLETED | OUTPATIENT
Start: 2022-10-29 | End: 2022-10-29

## 2022-10-29 RX ORDER — IBUPROFEN 200 MG
16 TABLET ORAL
Status: DISCONTINUED | OUTPATIENT
Start: 2022-10-29 | End: 2022-11-11 | Stop reason: HOSPADM

## 2022-10-29 RX ORDER — ENOXAPARIN SODIUM 100 MG/ML
40 INJECTION SUBCUTANEOUS EVERY 24 HOURS
Status: DISCONTINUED | OUTPATIENT
Start: 2022-10-29 | End: 2022-11-11 | Stop reason: HOSPADM

## 2022-10-29 RX ORDER — SODIUM CHLORIDE 0.9 % (FLUSH) 0.9 %
10 SYRINGE (ML) INJECTION EVERY 12 HOURS PRN
Status: DISCONTINUED | OUTPATIENT
Start: 2022-10-29 | End: 2022-11-11 | Stop reason: HOSPADM

## 2022-10-29 RX ORDER — LORAZEPAM 2 MG/ML
1 INJECTION INTRAMUSCULAR
Status: COMPLETED | OUTPATIENT
Start: 2022-10-29 | End: 2022-10-29

## 2022-10-29 RX ORDER — DEXTROSE MONOHYDRATE AND SODIUM CHLORIDE 5; .45 G/100ML; G/100ML
INJECTION, SOLUTION INTRAVENOUS CONTINUOUS
Status: DISCONTINUED | OUTPATIENT
Start: 2022-10-29 | End: 2022-11-02

## 2022-10-29 RX ORDER — ATORVASTATIN CALCIUM 40 MG/1
40 TABLET, FILM COATED ORAL DAILY
Status: DISCONTINUED | OUTPATIENT
Start: 2022-10-30 | End: 2022-11-01

## 2022-10-29 RX ORDER — AMOXICILLIN 250 MG
1 CAPSULE ORAL 2 TIMES DAILY
Status: DISCONTINUED | OUTPATIENT
Start: 2022-10-29 | End: 2022-11-11 | Stop reason: HOSPADM

## 2022-10-29 RX ORDER — ASPIRIN 81 MG/1
81 TABLET ORAL DAILY
Status: DISCONTINUED | OUTPATIENT
Start: 2022-10-30 | End: 2022-11-01

## 2022-10-29 RX ORDER — ACETAMINOPHEN 325 MG/1
650 TABLET ORAL EVERY 8 HOURS PRN
Status: DISCONTINUED | OUTPATIENT
Start: 2022-10-29 | End: 2022-10-31

## 2022-10-29 RX ORDER — ONDANSETRON 2 MG/ML
4 INJECTION INTRAMUSCULAR; INTRAVENOUS EVERY 8 HOURS PRN
Status: DISCONTINUED | OUTPATIENT
Start: 2022-10-29 | End: 2022-11-11 | Stop reason: HOSPADM

## 2022-10-29 RX ORDER — ACETAMINOPHEN 325 MG/1
650 TABLET ORAL EVERY 4 HOURS PRN
Status: DISCONTINUED | OUTPATIENT
Start: 2022-10-29 | End: 2022-10-31

## 2022-10-29 RX ORDER — LEVOTHYROXINE SODIUM 88 UG/1
88 TABLET ORAL
Status: DISCONTINUED | OUTPATIENT
Start: 2022-10-30 | End: 2022-10-31

## 2022-10-29 RX ORDER — MAG HYDROX/ALUMINUM HYD/SIMETH 200-200-20
30 SUSPENSION, ORAL (FINAL DOSE FORM) ORAL 4 TIMES DAILY PRN
Status: DISCONTINUED | OUTPATIENT
Start: 2022-10-29 | End: 2022-11-11 | Stop reason: HOSPADM

## 2022-10-29 RX ORDER — NALOXONE HCL 0.4 MG/ML
0.02 VIAL (ML) INJECTION
Status: DISCONTINUED | OUTPATIENT
Start: 2022-10-29 | End: 2022-11-11 | Stop reason: HOSPADM

## 2022-10-29 RX ORDER — IBUPROFEN 200 MG
24 TABLET ORAL
Status: DISCONTINUED | OUTPATIENT
Start: 2022-10-29 | End: 2022-11-11 | Stop reason: HOSPADM

## 2022-10-29 RX ORDER — AMLODIPINE BESYLATE 5 MG/1
10 TABLET ORAL DAILY
Status: DISCONTINUED | OUTPATIENT
Start: 2022-10-30 | End: 2022-11-01

## 2022-10-29 RX ORDER — GLUCAGON 1 MG
1 KIT INJECTION
Status: DISCONTINUED | OUTPATIENT
Start: 2022-10-29 | End: 2022-11-11 | Stop reason: HOSPADM

## 2022-10-29 RX ORDER — BISACODYL 10 MG
10 SUPPOSITORY, RECTAL RECTAL DAILY PRN
Status: DISCONTINUED | OUTPATIENT
Start: 2022-10-29 | End: 2022-11-11 | Stop reason: HOSPADM

## 2022-10-29 RX ORDER — HALOPERIDOL 5 MG/ML
2.5 INJECTION INTRAMUSCULAR
Status: COMPLETED | OUTPATIENT
Start: 2022-10-29 | End: 2022-10-29

## 2022-10-29 RX ORDER — OLANZAPINE 10 MG/2ML
5 INJECTION, POWDER, FOR SOLUTION INTRAMUSCULAR
Status: COMPLETED | OUTPATIENT
Start: 2022-10-29 | End: 2022-10-29

## 2022-10-29 RX ORDER — LORAZEPAM 2 MG/ML
1 INJECTION INTRAMUSCULAR ONCE
Status: COMPLETED | OUTPATIENT
Start: 2022-10-29 | End: 2022-10-29

## 2022-10-29 RX ADMIN — DEXTROSE AND SODIUM CHLORIDE: 5; .45 INJECTION, SOLUTION INTRAVENOUS at 07:10

## 2022-10-29 RX ADMIN — DIPHENHYDRAMINE HYDROCHLORIDE 25 MG: 50 INJECTION INTRAMUSCULAR; INTRAVENOUS at 11:10

## 2022-10-29 RX ADMIN — ENOXAPARIN SODIUM 40 MG: 100 INJECTION SUBCUTANEOUS at 04:10

## 2022-10-29 RX ADMIN — LORAZEPAM 1 MG: 2 INJECTION INTRAMUSCULAR; INTRAVENOUS at 04:10

## 2022-10-29 RX ADMIN — SODIUM CHLORIDE 1000 ML: 0.9 INJECTION, SOLUTION INTRAVENOUS at 11:10

## 2022-10-29 RX ADMIN — OLANZAPINE 5 MG: 10 INJECTION, POWDER, LYOPHILIZED, FOR SOLUTION INTRAMUSCULAR at 04:10

## 2022-10-29 RX ADMIN — LORAZEPAM 1 MG: 2 INJECTION INTRAMUSCULAR; INTRAVENOUS at 08:10

## 2022-10-29 RX ADMIN — DEXTROSE AND SODIUM CHLORIDE: 5; .45 INJECTION, SOLUTION INTRAVENOUS at 04:10

## 2022-10-29 RX ADMIN — HALOPERIDOL LACTATE 2.5 MG: 5 INJECTION, SOLUTION INTRAMUSCULAR at 11:10

## 2022-10-29 RX ADMIN — LORAZEPAM 1 MG: 2 INJECTION INTRAMUSCULAR; INTRAVENOUS at 11:10

## 2022-10-29 NOTE — HPI
71 yo male with h/o thyroid cancer, frontal temporal lobe dementia, HTN, HLP, depression and anxiety transferred from [facility] behavioral inpatient service due to 6 days of failure to thrive. It is reported from the facility that he was PEC/CEC (expires 11/3/22) due to combative behavior at home. History comes from the patient's spouse and sister-in -law at bedside. Patient follows a neurologist in [place] and spouse reports recent change in medications include increase of zyprexa from 2.5mg to 10mg BID. He was also started on gabapentin TID. Spouse denies recent fever, chills, cough, D/N/V.  Per the MAR from [place] he received Haldol x4 IM, benadryl and ativan IV x2. The facility reports he was not eating or drinking and no witnessed episodes of diarrhea or vomiting. On our workup he has Na153, elevated  AST, hematuria, WBC 17k with 2% bands, lactic acid 1.2. No source of infection on UA or chest xray. On exam he does show discomfort in RUQ and midepigastrium. Abd CT and lipase pending. Troponin mildly elevated 0.056 with some EKG changes.     Hospital medicine consulted for further medical management. Physical restraints will be used to protect patient as he is pulling at cardiac leads and peripheral IV. Neurology consulted. Needs tele sitter.

## 2022-10-29 NOTE — ASSESSMENT & PLAN NOTE
IVF until mental status is appropriate for oral diet  Progression of dementia  Palliative care discussion

## 2022-10-29 NOTE — SUBJECTIVE & OBJECTIVE
Past Medical History:   Diagnosis Date    Cancer     Dementia     Depression     Hypertension        Past Surgical History:   Procedure Laterality Date    Lymph node Ca removed         Review of patient's allergies indicates:  No Known Allergies    No current facility-administered medications on file prior to encounter.     Current Outpatient Medications on File Prior to Encounter   Medication Sig    ALPRAZolam (XANAX XR) 0.5 MG Tb24 Take by mouth once daily.    amLODIPine (NORVASC) 10 MG tablet Take 10 mg by mouth once daily.    aspirin (ECOTRIN) 81 MG EC tablet Take 81 mg by mouth once daily.    atorvastatin (LIPITOR) 40 MG tablet Take 40 mg by mouth once daily.    FLUoxetine 40 MG capsule Take 40 mg by mouth once daily.    levothyroxine (SYNTHROID) 88 MCG tablet Take 88 mcg by mouth before breakfast.    OLANZapine (ZYPREXA) 2.5 MG tablet Take 2.5 mg by mouth every evening.    OXcarbazepine (TRILEPTAL) 300 MG Tab Take 150 mg by mouth 2 (two) times daily.    vitamin D (VITAMIN D3) 1000 units Tab Take 1,000 Units by mouth once daily.     Family History    None       Tobacco Use    Smoking status: Not on file    Smokeless tobacco: Not on file   Substance and Sexual Activity    Alcohol use: Not on file    Drug use: Not on file    Sexual activity: Not on file     Review of Systems   Unable to perform ROS: Psychiatric disorder   Objective:     Vital Signs (Most Recent):  Temp: 97.9 °F (36.6 °C) (10/29/22 1102)  Pulse: 89 (10/29/22 1432)  Resp: 16 (10/29/22 1432)  BP: (!) 157/90 (10/29/22 1432)  SpO2: 100 % (10/29/22 1432)   Vital Signs (24h Range):  Temp:  [97.9 °F (36.6 °C)] 97.9 °F (36.6 °C)  Pulse:  [] 89  Resp:  [16-20] 16  SpO2:  [97 %-100 %] 100 %  BP: (104-160)/(57-90) 157/90     Weight: 63 kg (139 lb)  Body mass index is 21.77 kg/m².    Physical Exam  Constitutional:       General: He is in acute distress.      Appearance: He is ill-appearing.      Comments: Restless    HENT:      Head: Normocephalic and  atraumatic.      Mouth/Throat:      Mouth: Mucous membranes are dry.      Pharynx: Oropharynx is clear.   Eyes:      Pupils: Pupils are equal, round, and reactive to light.   Cardiovascular:      Rate and Rhythm: Normal rate and regular rhythm.      Pulses: Normal pulses.      Heart sounds: No murmur heard.  Pulmonary:      Effort: Pulmonary effort is normal.      Breath sounds: Normal breath sounds. No wheezing or rhonchi.   Abdominal:      General: Abdomen is flat. There is no distension.      Palpations: Abdomen is soft. There is no mass.      Tenderness: There is abdominal tenderness. There is no guarding or rebound.   Musculoskeletal:         General: No swelling or signs of injury.      Cervical back: Neck supple. No rigidity.   Lymphadenopathy:      Cervical: No cervical adenopathy.   Skin:     General: Skin is warm and dry.      Capillary Refill: Capillary refill takes less than 2 seconds.      Coloration: Skin is not jaundiced.      Findings: No lesion.   Neurological:      Mental Status: He is disoriented.   Psychiatric:      Comments: Agitated in the bed         CRANIAL NERVES     CN III, IV, VI   Pupils are equal, round, and reactive to light.     Significant Labs: All pertinent labs within the past 24 hours have been reviewed.  A1C: No results for input(s): HGBA1C in the last 4320 hours.  Bilirubin:   Recent Labs   Lab 10/23/22  1415 10/29/22  1121   BILITOT 0.3 0.9     Blood Culture: No results for input(s): LABBLOO in the last 48 hours.  CBC:   Recent Labs   Lab 10/29/22  1121   WBC 17.42*   HGB 13.7*   HCT 41.9        CMP:   Recent Labs   Lab 10/29/22  1121   *   K 4.3   *   CO2 24      BUN 42*   CREATININE 1.2   CALCIUM 10.7*   PROT 7.2   ALBUMIN 3.9   BILITOT 0.9   ALKPHOS 102   *   ALT 42   ANIONGAP 14     Cardiac Markers: No results for input(s): CKMB, MYOGLOBIN, BNP, TROPISTAT in the last 48 hours.  Coagulation: No results for input(s): PT, INR, APTT in the last  48 hours.  Lactic Acid:   Recent Labs   Lab 10/29/22  1423   LACTATE 1.2     Lipase: No results for input(s): LIPASE in the last 48 hours.  Lipid Panel: No results for input(s): CHOL, HDL, LDLCALC, TRIG, CHOLHDL in the last 48 hours.  Magnesium: No results for input(s): MG in the last 48 hours.  POCT Glucose: No results for input(s): POCTGLUCOSE in the last 48 hours.  Troponin:   Recent Labs   Lab 10/29/22  1232   TROPONINI 0.056*     TSH:   Recent Labs   Lab 10/23/22  1415   TSH 0.976     Urine Studies:   Recent Labs   Lab 10/29/22  1119   COLORU Yellow   APPEARANCEUA Clear   PHUR 6.0   SPECGRAV >=1.030*   PROTEINUA 1+*   GLUCUA Negative   KETONESU 1+*   BILIRUBINUA 1+*   OCCULTUA 3+*   NITRITE Negative   UROBILINOGEN 2.0-3.0*   LEUKOCYTESUR Negative   RBCUA 8*   WBCUA 2   BACTERIA Occasional   HYALINECASTS 0       Significant Imaging: right anterior cranial fossa, extending from the petrous temporal bone

## 2022-10-29 NOTE — ASSESSMENT & PLAN NOTE
Follows neurologist in [place] with recent increases in meds  Trileptal level pending  Hold gabapentin, prozac and zyprexa

## 2022-10-29 NOTE — H&P
[facility] Emergency [facility] Medicine  History & Physical    Patient Name: Ronn Caballero Jr.  MRN: [id number]  Patient Class: IP- Inpatient  Admission Date: 10/29/2022  Attending Physician: Chelo Gibbs MD   Primary Care Provider: JELLY Decker         Patient information was obtained from spouse/SO, past medical records and ER records.     Subjective:     Principal Problem:Dementia with behavioral disturbance    Chief Complaint:   Chief Complaint   Patient presents with    Dementia     From [place]         HPI: 73 yo male with h/o thyroid cancer, frontal temporal lobe dementia, HTN, HLP, depression and anxiety transferred from [place] behavioral inpatient service due to 6 days of failure to thrive. It is reported from the facility that he was PEC/CEC (expires 11/3/22) due to combative behavior at home. History comes from the patient's spouse and sister-in -law at bedside. Patient follows a neurologist in [place] and spouse reports recent change in medications include increase of zyprexa from 2.5mg to 10mg BID. He was also started on gabapentin TID. Spouse denies recent fever, chills, cough, D/N/V.  Per the MAR from [place] he received Haldol x4 IM, benadryl and ativan IV x2. The facility reports he was not eating or drinking and no witnessed episodes of diarrhea or vomiting. On our workup he has Na153, elevated  AST, hematuria, WBC 17k with 2% bands, lactic acid 1.2. No source of infection on UA or chest xray. On exam he does show discomfort in RUQ and midepigastrium. Abd CT and lipase pending. Troponin mildly elevated 0.056 with some EKG changes.     Hospital medicine consulted for further medical management. Physical restraints will be used to protect patient as he is pulling at cardiac leads and peripheral IV. Neurology consulted. Needs tele sitter.       Past Medical History:   Diagnosis Date    Cancer     Dementia     Depression     Hypertension        Past Surgical History:    Procedure Laterality Date    Lymph node Ca removed         Review of patient's allergies indicates:  No Known Allergies    No current facility-administered medications on file prior to encounter.     Current Outpatient Medications on File Prior to Encounter   Medication Sig    ALPRAZolam (XANAX XR) 0.5 MG Tb24 Take by mouth once daily.    amLODIPine (NORVASC) 10 MG tablet Take 10 mg by mouth once daily.    aspirin (ECOTRIN) 81 MG EC tablet Take 81 mg by mouth once daily.    atorvastatin (LIPITOR) 40 MG tablet Take 40 mg by mouth once daily.    FLUoxetine 40 MG capsule Take 40 mg by mouth once daily.    levothyroxine (SYNTHROID) 88 MCG tablet Take 88 mcg by mouth before breakfast.    OLANZapine (ZYPREXA) 2.5 MG tablet Take 2.5 mg by mouth every evening.    OXcarbazepine (TRILEPTAL) 300 MG Tab Take 150 mg by mouth 2 (two) times daily.    vitamin D (VITAMIN D3) 1000 units Tab Take 1,000 Units by mouth once daily.     Family History    None       Tobacco Use    Smoking status: Not on file    Smokeless tobacco: Not on file   Substance and Sexual Activity    Alcohol use: Not on file    Drug use: Not on file    Sexual activity: Not on file     Review of Systems   Unable to perform ROS: Psychiatric disorder   Objective:     Vital Signs (Most Recent):  Temp: 97.9 °F (36.6 °C) (10/29/22 1102)  Pulse: 89 (10/29/22 1432)  Resp: 16 (10/29/22 1432)  BP: (!) 157/90 (10/29/22 1432)  SpO2: 100 % (10/29/22 1432)   Vital Signs (24h Range):  Temp:  [97.9 °F (36.6 °C)] 97.9 °F (36.6 °C)  Pulse:  [] 89  Resp:  [16-20] 16  SpO2:  [97 %-100 %] 100 %  BP: (104-160)/(57-90) 157/90     Weight: 63 kg (139 lb)  Body mass index is 21.77 kg/m².    Physical Exam  Constitutional:       General: He is in acute distress.      Appearance: He is ill-appearing.      Comments: Restless    HENT:      Head: Normocephalic and atraumatic.      Mouth/Throat:      Mouth: Mucous membranes are dry.      Pharynx: Oropharynx is clear.    Eyes:      Pupils: Pupils are equal, round, and reactive to light.   Cardiovascular:      Rate and Rhythm: Normal rate and regular rhythm.      Pulses: Normal pulses.      Heart sounds: No murmur heard.  Pulmonary:      Effort: Pulmonary effort is normal.      Breath sounds: Normal breath sounds. No wheezing or rhonchi.   Abdominal:      General: Abdomen is flat. There is no distension.      Palpations: Abdomen is soft. There is no mass.      Tenderness: There is abdominal tenderness. There is no guarding or rebound.   Musculoskeletal:         General: No swelling or signs of injury.      Cervical back: Neck supple. No rigidity.   Lymphadenopathy:      Cervical: No cervical adenopathy.   Skin:     General: Skin is warm and dry.      Capillary Refill: Capillary refill takes less than 2 seconds.      Coloration: Skin is not jaundiced.      Findings: No lesion.   Neurological:      Mental Status: He is disoriented.   Psychiatric:      Comments: Agitated in the bed         CRANIAL NERVES     CN III, IV, VI   Pupils are equal, round, and reactive to light.     Significant Labs: All pertinent labs within the past 24 hours have been reviewed.  A1C: No results for input(s): HGBA1C in the last 4320 hours.  Bilirubin:   Recent Labs   Lab 10/23/22  1415 10/29/22  1121   BILITOT 0.3 0.9     Blood Culture: No results for input(s): LABBLOO in the last 48 hours.  CBC:   Recent Labs   Lab 10/29/22  1121   WBC 17.42*   HGB 13.7*   HCT 41.9        CMP:   Recent Labs   Lab 10/29/22  1121   *   K 4.3   *   CO2 24      BUN 42*   CREATININE 1.2   CALCIUM 10.7*   PROT 7.2   ALBUMIN 3.9   BILITOT 0.9   ALKPHOS 102   *   ALT 42   ANIONGAP 14     Cardiac Markers: No results for input(s): CKMB, MYOGLOBIN, BNP, TROPISTAT in the last 48 hours.  Coagulation: No results for input(s): PT, INR, APTT in the last 48 hours.  Lactic Acid:   Recent Labs   Lab 10/29/22  1423   LACTATE 1.2     Lipase: No results for  input(s): LIPASE in the last 48 hours.  Lipid Panel: No results for input(s): CHOL, HDL, LDLCALC, TRIG, CHOLHDL in the last 48 hours.  Magnesium: No results for input(s): MG in the last 48 hours.  POCT Glucose: No results for input(s): POCTGLUCOSE in the last 48 hours.  Troponin:   Recent Labs   Lab 10/29/22  1232   TROPONINI 0.056*     TSH:   Recent Labs   Lab 10/23/22  1415   TSH 0.976     Urine Studies:   Recent Labs   Lab 10/29/22  1119   COLORU Yellow   APPEARANCEUA Clear   PHUR 6.0   SPECGRAV >=1.030*   PROTEINUA 1+*   GLUCUA Negative   KETONESU 1+*   BILIRUBINUA 1+*   OCCULTUA 3+*   NITRITE Negative   UROBILINOGEN 2.0-3.0*   LEUKOCYTESUR Negative   RBCUA 8*   WBCUA 2   BACTERIA Occasional   HYALINECASTS 0       Significant Imaging: right anterior cranial fossa, extending from the petrous temporal bone    Assessment/Plan:     * Dementia with behavioral disturbance  Sharp change in status. Possibly infectious or metabolic   CT of abdomen and pelvis  IVF   Neurology consulted - patient unable to do tele psych visit at this time   No haldol given here  Hold zyprexa and gabapentin   Restraints if danger to himself  Tele monitoring       Bandemia  Leukocytosis - unknown etiology  LA 1.2 and no fever  UA - no signs of infections  CXR - clear   CT of abdomen  Trend CBC       Frontotemporal dementia  Follows neurologist in [place] with recent increases in meds  Trileptal level pending  Hold gabapentin, prozac and zyprexa        Postoperative hypothyroidism  Resume synthroid   FT4       Hypernatremia    d5 .45 NS IVF  BMp in am     Failure to thrive in adult  IVF until mental status is appropriate for oral diet  Progression of dementia  Palliative care discussion       essential hypertension  If passes swallow eval, resume home norvasc  IV labetalol PRN         VTE Risk Mitigation (From admission, onward)         Ordered     enoxaparin injection 40 mg  Daily         10/29/22 1513     IP VTE HIGH RISK PATIENT  Once          10/29/22 1513     Place sequential compression device  Until discontinued         10/29/22 1513                   Chelo Gibbs MD  Department of Hospital Medicine   [facility] - Emergency Dept

## 2022-10-29 NOTE — ASSESSMENT & PLAN NOTE
Sharp change in status. Possibly infectious or metabolic   CT of abdomen and pelvis  IVF   Neurology consulted - patient unable to do tele psych visit at this time   No haldol given here  Hold zyprexa and gabapentin   Restraints if danger to himself  Tele monitoring

## 2022-10-29 NOTE — ED PROVIDER NOTES
Encounter Date: 10/29/2022    SCRIBE #1 NOTE: I, Erica Vega, am scribing for, and in the presence of,  Kel Faith MD.     History     Chief Complaint   Patient presents with    Dementia     From [facility] seen by provider: 10:50 AM on 10/29/2022    Ronn Caballero Jr. is a 72 y.o. male who presents to the ED via EMS for worsening appetite and activity change for 6 days. Pt was sent from [facility] for failure to thrive, pt is not eating or drinking. Per [facility], pt usually is talking and able to walk on his own. Per EMS, pt has stable vital signs. Pt has a Hx of progressive dementia. Pt takes gabapentin, benadryl, ativan, haldol, loprazolam, Zyprexa, trileptal, and vistaril. The patient denies any other symptoms at this time. PMHx of cancer, depression, HTN, and dementia. PSHx of lymph node cancer removed.     The history is provided by the patient, the nursing home and the EMS personnel.   Review of patient's allergies indicates:  No Known Allergies  Past Medical History:   Diagnosis Date    Cancer     Dementia     Depression     Hypertension      Past Surgical History:   Procedure Laterality Date    Lymph node Ca removed       No family history on file.     Review of Systems   Unable to perform ROS: Dementia   Constitutional:  Positive for activity change and appetite change.     Physical Exam     Initial Vitals [10/29/22 1102]   BP Pulse Resp Temp SpO2   (!) 160/87 93 18 97.9 °F (36.6 °C) 98 %      MAP       --         Physical Exam    Nursing note and vitals reviewed.  Constitutional: He appears well-developed and well-nourished. He is not diaphoretic. No distress.   HENT:   Head: Normocephalic and atraumatic.   Mouth/Throat: Mucous membranes are dry.   Eyes: Conjunctivae and EOM are normal. Pupils are equal, round, and reactive to light.   Neck: Neck supple.   Old scar on left lateral neck.   Cardiovascular:  Normal rate, regular rhythm and normal heart sounds.      Exam reveals no gallop and no friction rub.       No murmur heard.  Pulmonary/Chest: Breath sounds normal. No respiratory distress. He has no wheezes. He has no rhonchi. He has no rales.   Abdominal: Abdomen is soft. Bowel sounds are normal. He exhibits no distension. There is no abdominal tenderness.   Musculoskeletal:         General: No tenderness or edema. Normal range of motion.      Cervical back: Neck supple.      Comments: Able to move BLE.     Neurological:   Rambling. Loose associations.    Skin: Skin is warm and dry.   Psychiatric: He has a normal mood and affect.       ED Course   Procedures  Labs Reviewed   CBC W/ AUTO DIFFERENTIAL - Abnormal; Notable for the following components:       Result Value    WBC 17.42 (*)     RBC 4.44 (*)     Hemoglobin 13.7 (*)     Gran % 85.0 (*)     Lymph % 3.0 (*)     All other components within normal limits   COMPREHENSIVE METABOLIC PANEL - Abnormal; Notable for the following components:    Sodium 153 (*)     Chloride 115 (*)     BUN 42 (*)     Calcium 10.7 (*)      (*)     All other components within normal limits   URINALYSIS, REFLEX TO URINE CULTURE - Abnormal; Notable for the following components:    Specific Gravity, UA >=1.030 (*)     Protein, UA 1+ (*)     Ketones, UA 1+ (*)     Bilirubin (UA) 1+ (*)     Occult Blood UA 3+ (*)     Urobilinogen, UA 2.0-3.0 (*)     All other components within normal limits    Narrative:     Specimen Source->Urine   URINALYSIS MICROSCOPIC - Abnormal; Notable for the following components:    RBC, UA 8 (*)     All other components within normal limits    Narrative:     Specimen Source->Urine   TROPONIN I - Abnormal; Notable for the following components:    Troponin I 0.056 (*)     All other components within normal limits   CULTURE, BLOOD   CULTURE, BLOOD   LACTIC ACID, PLASMA   OXCARBAZEPINE METABOLITE ([facility])   PROCALCITONIN   CK   T4, FREE   LIPASE   TROPONIN I          Imaging Results              CT Head Without Contrast  (Final result)  Result time 10/29/22 12:01:18      Final result by Luana Horner MD (10/29/22 12:01:18)                   Impression:      Calcified mass in the floor of the right middle cranial fossa, 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  This is likely an incidental finding.  It produces no mass effect/edema on the adjacent brain.  Otherwise, essentially negative study, aside from atrophy.      Electronically signed by: Luana Horner  Date:    10/29/2022  Time:    12:01               Narrative:    EXAMINATION:  CT HEAD WITHOUT CONTRAST    CLINICAL HISTORY:  Mental status change, unknown cause;    TECHNIQUE:  Low dose axial images were obtained through the head.  Coronal and sagittal reformations were also performed. Contrast was not administered.    COMPARISON:  None    FINDINGS:  The study is mildly limited by motion.  There is no intra or extra-axial hemorrhage.  No mass effect, edema or midline shift is present.  Mild generalized atrophy, particularly central atrophy is noted.  There is no skull fracture.  The visualized paranasal sinuses are clear.    There is 1.6 cm calcified mass in the floor of the right anterior cranial fossa, extending from the petrous temporal bone.  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  There is no edema in the adjacent temporal lobe.                                       X-Ray Chest AP Portable (Final result)  Result time 10/29/22 11:35:49      Final result by Luana Horner MD (10/29/22 11:35:49)                   Impression:      No acute cardiopulmonary disease.      Electronically signed by: Luana Horner  Date:    10/29/2022  Time:    11:35               Narrative:    EXAMINATION:  XR CHEST AP PORTABLE    CLINICAL HISTORY:  Altered mental status, unspecified    TECHNIQUE:  Single frontal view of the chest was performed.    COMPARISON:  05/28/2018    FINDINGS:  The patient is mildly rotated to the right.  The heart size is normal.  The  aorta is ectatic.  The lungs are clear.  There is no pleural effusion or acute bony abnormality.                                       Medications   amLODIPine tablet 10 mg (has no administration in time range)   aspirin EC tablet 81 mg (has no administration in time range)   atorvastatin tablet 40 mg (has no administration in time range)   levothyroxine tablet 88 mcg (has no administration in time range)   sodium chloride 0.9% flush 10 mL (has no administration in time range)   naloxone 0.4 mg/mL injection 0.02 mg (has no administration in time range)   glucose chewable tablet 16 g (has no administration in time range)   glucose chewable tablet 24 g (has no administration in time range)   glucagon (human recombinant) injection 1 mg (has no administration in time range)   dextrose 10% bolus 125 mL (has no administration in time range)   dextrose 10% bolus 250 mL (has no administration in time range)   dextrose 5 % and 0.45 % NaCl infusion (has no administration in time range)   enoxaparin injection 40 mg (has no administration in time range)   acetaminophen tablet 650 mg (has no administration in time range)   senna-docusate 8.6-50 mg per tablet 1 tablet (has no administration in time range)   bisacodyL suppository 10 mg (has no administration in time range)   ondansetron injection 4 mg (has no administration in time range)   acetaminophen tablet 650 mg (has no administration in time range)   aluminum-magnesium hydroxide-simethicone 200-200-20 mg/5 mL suspension 30 mL (has no administration in time range)   sodium chloride 0.9% bolus 1,000 mL (0 mLs Intravenous Stopped 10/29/22 1215)   haloperidol lactate injection 2.5 mg (2.5 mg Intravenous Given 10/29/22 1158)   LORazepam injection 1 mg (1 mg Intravenous Given 10/29/22 1152)   diphenhydrAMINE injection 25 mg (25 mg Intravenous Given 10/29/22 1152)     Medical Decision Making:   History:   Old Medical Records: I decided to obtain old medical records.  Clinical  Tests:   Lab Tests: Ordered and Reviewed  Radiological Study: Reviewed and Ordered        Scribe Attestation:   Scribe #1: I performed the above scribed service and the documentation accurately describes the services I performed. I attest to the accuracy of the note.      ED Course as of 10/29/22 1530   Sat Oct 29, 2022   1124 Daughter states patient dementia has been progressing over the past 3 weeks.  A week ago he ran away from the house and was aggressive.  AT [place] he hasn't been taking meds nor is he eating or drinking.  [JS]   1159 Patient was agitated and trying to climb out of the bed.  Head given Haldol Ativan and Benadryl.  He is not eating and appears to be significantly dehydrated with a sodium of 153.  He will need admission for IV fluids and further workup. [JS]   1220 Unclear the etiology of the patient's altered mental status however he does have dementia and after further review of the chart is frontal temporal lobe dementia which Haldol would be contraindicated and I do not think he should get any more this medication.  He does appear to be dehydrated and has hypernatremia as well as leukocytosis.  He needs IV fluids holding of his medications and further evaluation.  CT head showed a small mass but it appears to be a meningioma is not likely the cause of symptoms.  No evidence of urinary tract infection.  I spoke to the hospitalist who agreed with admission.  I doubt this is normal pressure hydrocephalus.  He has no fevers to suggest encephalitis or meningitis.  Abdomen soft nontender nondistended.  Chest x-ray appears to be within normal limits. [JS]   1226 EKG independently interpreted by me as rate 90 normal sinus rhythm T-wave inversion inferior laterally no ST segment elevation or depression prolonged QTc 496 [JS]      ED Course User Index  [JS] Kel Faith MD        I, Dr. Kel Faith personally performed the services described in this documentation. All medical record entries  made by the scribe were at my direction and in my presence.  I have reviewed the chart and agree that the record reflects my personal performance and is accurate and complete. Kel Faith MD.  12:22 PM 10/29/2022    DISCLAIMER: This note was prepared with Dragon NaturallySpeaking voice recognition transcription software. Garbled syntax, mangled pronouns, and other bizarre constructions may be attributed to that software system          Clinical Impression:   Final diagnoses:  [R41.82] Altered mental status  [R41.82] Altered mental status, unspecified altered mental status type (Primary)  [E86.0] Dehydration      ED Disposition Condition    Admit                 Kel Faith MD  10/29/22 1222       Kel Faith MD  10/29/22 8787

## 2022-10-30 LAB
ALBUMIN SERPL BCP-MCNC: 2.9 G/DL (ref 3.5–5.2)
ALP SERPL-CCNC: 83 U/L (ref 55–135)
ALT SERPL W/O P-5'-P-CCNC: 44 U/L (ref 10–44)
ANION GAP SERPL CALC-SCNC: 8 MMOL/L (ref 8–16)
AST SERPL-CCNC: 108 U/L (ref 10–40)
BASOPHILS # BLD AUTO: 0.05 K/UL (ref 0–0.2)
BASOPHILS NFR BLD: 0.5 % (ref 0–1.9)
BILIRUB SERPL-MCNC: 0.7 MG/DL (ref 0.1–1)
BUN SERPL-MCNC: 30 MG/DL (ref 8–23)
CALCIUM SERPL-MCNC: 9 MG/DL (ref 8.7–10.5)
CHLORIDE SERPL-SCNC: 120 MMOL/L (ref 95–110)
CO2 SERPL-SCNC: 22 MMOL/L (ref 23–29)
CREAT SERPL-MCNC: 0.9 MG/DL (ref 0.5–1.4)
DIFFERENTIAL METHOD: ABNORMAL
EOSINOPHIL # BLD AUTO: 0.1 K/UL (ref 0–0.5)
EOSINOPHIL NFR BLD: 1.2 % (ref 0–8)
ERYTHROCYTE [DISTWIDTH] IN BLOOD BY AUTOMATED COUNT: 12.9 % (ref 11.5–14.5)
EST. GFR  (NO RACE VARIABLE): >60 ML/MIN/1.73 M^2
GLUCOSE SERPL-MCNC: 112 MG/DL (ref 70–110)
HCT VFR BLD AUTO: 33.4 % (ref 40–54)
HGB BLD-MCNC: 11 G/DL (ref 14–18)
IMM GRANULOCYTES # BLD AUTO: 0.03 K/UL (ref 0–0.04)
IMM GRANULOCYTES NFR BLD AUTO: 0.3 % (ref 0–0.5)
LYMPHOCYTES # BLD AUTO: 0.7 K/UL (ref 1–4.8)
LYMPHOCYTES NFR BLD: 7.9 % (ref 18–48)
MAGNESIUM SERPL-MCNC: 2.1 MG/DL (ref 1.6–2.6)
MCH RBC QN AUTO: 30.5 PG (ref 27–31)
MCHC RBC AUTO-ENTMCNC: 32.9 G/DL (ref 32–36)
MCV RBC AUTO: 93 FL (ref 82–98)
MONOCYTES # BLD AUTO: 1.5 K/UL (ref 0.3–1)
MONOCYTES NFR BLD: 16.2 % (ref 4–15)
NEUTROPHILS # BLD AUTO: 6.8 K/UL (ref 1.8–7.7)
NEUTROPHILS NFR BLD: 73.9 % (ref 38–73)
NRBC BLD-RTO: 0 /100 WBC
PHOSPHATE SERPL-MCNC: 2 MG/DL (ref 2.7–4.5)
PLATELET # BLD AUTO: 217 K/UL (ref 150–450)
PMV BLD AUTO: 10.8 FL (ref 9.2–12.9)
POTASSIUM SERPL-SCNC: 3.3 MMOL/L (ref 3.5–5.1)
PROT SERPL-MCNC: 5.6 G/DL (ref 6–8.4)
RBC # BLD AUTO: 3.61 M/UL (ref 4.6–6.2)
SODIUM SERPL-SCNC: 150 MMOL/L (ref 136–145)
VIT B12 SERPL-MCNC: 1119 PG/ML (ref 210–950)
WBC # BLD AUTO: 9.24 K/UL (ref 3.9–12.7)

## 2022-10-30 PROCEDURE — S5010 5% DEXTROSE AND 0.45% SALINE: HCPCS | Performed by: HOSPITALIST

## 2022-10-30 PROCEDURE — 80183 DRUG SCRN QUANT OXCARBAZEPIN: CPT | Performed by: NURSE PRACTITIONER

## 2022-10-30 PROCEDURE — 80053 COMPREHEN METABOLIC PANEL: CPT | Performed by: HOSPITALIST

## 2022-10-30 PROCEDURE — 36415 COLL VENOUS BLD VENIPUNCTURE: CPT | Performed by: HOSPITALIST

## 2022-10-30 PROCEDURE — 12000002 HC ACUTE/MED SURGE SEMI-PRIVATE ROOM

## 2022-10-30 PROCEDURE — 83735 ASSAY OF MAGNESIUM: CPT | Performed by: HOSPITALIST

## 2022-10-30 PROCEDURE — 84100 ASSAY OF PHOSPHORUS: CPT | Performed by: HOSPITALIST

## 2022-10-30 PROCEDURE — 85025 COMPLETE CBC W/AUTO DIFF WBC: CPT | Performed by: HOSPITALIST

## 2022-10-30 PROCEDURE — 63600175 PHARM REV CODE 636 W HCPCS: Performed by: HOSPITALIST

## 2022-10-30 PROCEDURE — S0166 INJ OLANZAPINE 2.5MG: HCPCS | Performed by: HOSPITALIST

## 2022-10-30 PROCEDURE — 63600175 PHARM REV CODE 636 W HCPCS: Performed by: NURSE PRACTITIONER

## 2022-10-30 PROCEDURE — 82607 VITAMIN B-12: CPT | Performed by: NURSE PRACTITIONER

## 2022-10-30 PROCEDURE — 94761 N-INVAS EAR/PLS OXIMETRY MLT: CPT

## 2022-10-30 PROCEDURE — 25000003 PHARM REV CODE 250: Performed by: NURSE PRACTITIONER

## 2022-10-30 PROCEDURE — 25000003 PHARM REV CODE 250: Performed by: HOSPITALIST

## 2022-10-30 PROCEDURE — 84425 ASSAY OF VITAMIN B-1: CPT | Performed by: NURSE PRACTITIONER

## 2022-10-30 RX ORDER — POTASSIUM CHLORIDE 7.45 MG/ML
10 INJECTION INTRAVENOUS
Status: COMPLETED | OUTPATIENT
Start: 2022-10-30 | End: 2022-10-30

## 2022-10-30 RX ORDER — LORAZEPAM 2 MG/ML
2 INJECTION INTRAMUSCULAR ONCE
Status: COMPLETED | OUTPATIENT
Start: 2022-10-30 | End: 2022-10-30

## 2022-10-30 RX ORDER — ACETAMINOPHEN 650 MG/1
650 SUPPOSITORY RECTAL ONCE
Status: COMPLETED | OUTPATIENT
Start: 2022-10-30 | End: 2022-10-30

## 2022-10-30 RX ORDER — OLANZAPINE 10 MG/2ML
5 INJECTION, POWDER, FOR SOLUTION INTRAMUSCULAR ONCE AS NEEDED
Status: COMPLETED | OUTPATIENT
Start: 2022-10-30 | End: 2022-10-30

## 2022-10-30 RX ORDER — FLUOXETINE HYDROCHLORIDE 20 MG/1
40 CAPSULE ORAL DAILY
Status: DISCONTINUED | OUTPATIENT
Start: 2022-10-30 | End: 2022-10-31

## 2022-10-30 RX ORDER — LORAZEPAM 2 MG/ML
2 INJECTION INTRAMUSCULAR ONCE
Status: DISCONTINUED | OUTPATIENT
Start: 2022-10-30 | End: 2022-10-30

## 2022-10-30 RX ORDER — OLANZAPINE 10 MG/2ML
5 INJECTION, POWDER, FOR SOLUTION INTRAMUSCULAR ONCE AS NEEDED
Status: COMPLETED | OUTPATIENT
Start: 2022-10-30 | End: 2022-10-31

## 2022-10-30 RX ORDER — OXCARBAZEPINE 150 MG/1
300 TABLET, FILM COATED ORAL 2 TIMES DAILY
Status: DISCONTINUED | OUTPATIENT
Start: 2022-10-30 | End: 2022-11-01

## 2022-10-30 RX ORDER — OLANZAPINE 10 MG/2ML
10 INJECTION, POWDER, FOR SOLUTION INTRAMUSCULAR ONCE AS NEEDED
Status: COMPLETED | OUTPATIENT
Start: 2022-10-30 | End: 2022-10-30

## 2022-10-30 RX ADMIN — OLANZAPINE 10 MG: 10 INJECTION, POWDER, FOR SOLUTION INTRAMUSCULAR at 05:10

## 2022-10-30 RX ADMIN — POTASSIUM CHLORIDE 10 MEQ: 7.46 INJECTION, SOLUTION INTRAVENOUS at 11:10

## 2022-10-30 RX ADMIN — POTASSIUM CHLORIDE 10 MEQ: 7.46 INJECTION, SOLUTION INTRAVENOUS at 12:10

## 2022-10-30 RX ADMIN — LORAZEPAM 2 MG: 2 INJECTION INTRAMUSCULAR; INTRAVENOUS at 01:10

## 2022-10-30 RX ADMIN — OLANZAPINE 5 MG: 10 INJECTION, POWDER, LYOPHILIZED, FOR SOLUTION INTRAMUSCULAR at 03:10

## 2022-10-30 RX ADMIN — ENOXAPARIN SODIUM 40 MG: 100 INJECTION SUBCUTANEOUS at 05:10

## 2022-10-30 RX ADMIN — FLUOXETINE 40 MG: 20 CAPSULE ORAL at 02:10

## 2022-10-30 RX ADMIN — POTASSIUM CHLORIDE 10 MEQ: 7.46 INJECTION, SOLUTION INTRAVENOUS at 10:10

## 2022-10-30 RX ADMIN — POTASSIUM CHLORIDE 10 MEQ: 7.46 INJECTION, SOLUTION INTRAVENOUS at 08:10

## 2022-10-30 RX ADMIN — DEXTROSE AND SODIUM CHLORIDE: 5; .45 INJECTION, SOLUTION INTRAVENOUS at 10:10

## 2022-10-30 RX ADMIN — ACETAMINOPHEN 650 MG: 650 SUPPOSITORY RECTAL at 01:10

## 2022-10-30 NOTE — NURSING
Received patient to the floor. Patient in 4 point restraints. Restraints removed from (B) ankles. CEC/ PEC paperwork in the chart. Family at bedside, but instructed on guidelines of CEC hold. Belongings removed from the room by the wife and daughter. Room also prepped for safety by maintenance. Hospital approved sitter at bedside.

## 2022-10-30 NOTE — NURSING
FELICITY Reno, NP notified of current vital signs, that patient feels very warm and also that patient is becoming increasingly agitated and yelling once again. Informed her that patient was changed from episode of incontinence and that did not seem to help. New orders rec'd.

## 2022-10-30 NOTE — SUBJECTIVE & OBJECTIVE
Interval History: CEC with sitter present, in restraints and highly agitated, not following commands, T 100.5 last night, not able to redirect patient to calm down     Review of Systems   Unable to perform ROS: Psychiatric disorder   Objective:     Vital Signs (Most Recent):  Temp: 98.3 °F (36.8 °C) (10/30/22 1143)  Pulse: 74 (10/30/22 1143)  Resp: 18 (10/30/22 1143)  BP: 119/74 (10/30/22 1143)  SpO2: 97 % (10/30/22 1143) Vital Signs (24h Range):  Temp:  [96.7 °F (35.9 °C)-100.5 °F (38.1 °C)] 98.3 °F (36.8 °C)  Pulse:  [] 74  Resp:  [16-20] 18  SpO2:  [95 %-100 %] 97 %  BP: (110-175)/(56-90) 119/74     Weight: 56.7 kg (125 lb)  Body mass index is 19.58 kg/m².  No intake or output data in the 24 hours ending 10/30/22 1413   Physical Exam  Constitutional:       General: He is in acute distress.      Comments: Restless in restraints    Cardiovascular:      Rate and Rhythm: Normal rate and regular rhythm.      Heart sounds: No murmur heard.  Pulmonary:      Effort: Pulmonary effort is normal.      Breath sounds: Normal breath sounds. No wheezing.   Abdominal:      General: There is no distension.      Palpations: There is no mass.   Musculoskeletal:         General: Normal range of motion.   Skin:     General: Skin is warm and dry.   Neurological:      Mental Status: He is disoriented.      Comments: Not able to follow commands due to agitation    Psychiatric:      Comments: Yelling, screaming, cussing        Significant Labs: All pertinent labs within the past 24 hours have been reviewed.  Blood Culture:   Recent Labs   Lab 10/29/22  1542 10/29/22  1543   LABBLOO No Growth to date No Growth to date     CBC:   Recent Labs   Lab 10/29/22  1121 10/30/22  0428   WBC 17.42* 9.24   HGB 13.7* 11.0*   HCT 41.9 33.4*    217     CMP:   Recent Labs   Lab 10/29/22  1121 10/30/22  0428   * 150*   K 4.3 3.3*   * 120*   CO2 24 22*    112*   BUN 42* 30*   CREATININE 1.2 0.9   CALCIUM 10.7* 9.0   PROT  7.2 5.6*   ALBUMIN 3.9 2.9*   BILITOT 0.9 0.7   ALKPHOS 102 83   * 108*   ALT 42 44   ANIONGAP 14 8     Cardiac Markers: No results for input(s): CKMB, MYOGLOBIN, BNP, TROPISTAT in the last 48 hours.  Lactic Acid:   Recent Labs   Lab 10/29/22  1423   LACTATE 1.2     Lipase:   Recent Labs   Lab 10/29/22  1543   LIPASE 7     Lipid Panel: No results for input(s): CHOL, HDL, LDLCALC, TRIG, CHOLHDL in the last 48 hours.  Magnesium:   Recent Labs   Lab 10/30/22  0428   MG 2.1     POCT Glucose: No results for input(s): POCTGLUCOSE in the last 48 hours.  Troponin:   Recent Labs   Lab 10/29/22  1232 10/29/22  1656 10/29/22  2112   TROPONINI 0.056* 0.067* 0.078*     TSH:   Recent Labs   Lab 10/23/22  1415   TSH 0.976     Urine Studies:   Recent Labs   Lab 10/29/22  1119   COLORU Yellow   APPEARANCEUA Clear   PHUR 6.0   SPECGRAV >=1.030*   PROTEINUA 1+*   GLUCUA Negative   KETONESU 1+*   BILIRUBINUA 1+*   OCCULTUA 3+*   NITRITE Negative   UROBILINOGEN 2.0-3.0*   LEUKOCYTESUR Negative   RBCUA 8*   WBCUA 2   BACTERIA Occasional   HYALINECASTS 0       Significant Imaging: I have reviewed all pertinent imaging results/findings within the past 24 hours.

## 2022-10-30 NOTE — CONSULTS
[facility]  Department of Neurology  Neurology Consultation Note        PATIENT NAME: Ronn Caballero Jr.  MRN: [id number]  CSN: [id number]      TODAY'S DATE: 10/30/2022  ADMIT DATE: 10/29/2022                            CONSULTING PROVIDER: Grecia Garay NP  CONSULT REQUESTED BY: Chelo Gibbs MD       Patient Location:  311/311 A     Patient information was obtained from patient, nurse and chart.     SUBJECTIVE    Principal Problem:Dementia with behavioral disturbance     Chief Complaint:        Chief Complaint   Patient presents with    Dementia       From [place]          PER EMR HPI: 73 yo male with h/o thyroid cancer, frontal temporal lobe dementia, HTN, HLP, depression and anxiety transferred from [place] behavioral inpatient service due to 6 days of failure to thrive. It is reported from the facility that he was PEC/CEC (expires 11/3/22) due to combative behavior at home. History comes from the patient's spouse and sister-in -law at bedside. Patient follows a neurologist in [place] and spouse reports recent change in medications include increase of zyprexa from 2.5mg to 10mg BID. He was also started on gabapentin TID. Spouse denies recent fever, chills, cough, D/N/V.  Per the MAR from [place] he received Haldol x4 IM, benadryl and ativan IV x2. The facility reports he was not eating or drinking and no witnessed episodes of diarrhea or vomiting. On our workup he has Na153, elevated  AST, hematuria, WBC 17k with 2% bands, lactic acid 1.2. No source of infection on UA or chest xray. On exam he does show discomfort in RUQ and midepigastrium. Abd CT and lipase pending. Troponin mildly elevated 0.056 with some EKG changes.      Hospital medicine consulted for further medical management. Physical restraints will be used to protect patient as he is pulling at cardiac leads and peripheral IV. Neurology consulted. Needs tele sitter.     Neurology Consult: Patient seen and examined with Dr. Carmona.  The patient was calmer than earlier today however, he does not follow commands and yells out. Patient's wife is reporting that the patient's condition is worsening. She reports that he is very paranoid that someone is going to kill him or take him away.      DIAGNOSES:     Active Hospital Problems    Diagnosis  POA    *Dementia with behavioral disturbance [F03.918]  Yes    essential hypertension [I10]  Yes    Failure to thrive in adult [R62.7]  Yes    Hypernatremia [E87.0]  Yes    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]  Yes    Frontotemporal dementia [G31.09, F02.80]  Yes    Bandemia [D72.825]  Yes      Resolved Hospital Problems   No resolved problems to display.       PRINCIPLE PROBLEM:  Dementia with behavioral disturbance    DOES PATIENT TAKE ANY BLOOD THINNERS? No     PAST MEDICAL HISTORY   Past Medical History:   Diagnosis Date    Cancer     Dementia     Depression     Hypertension         PAST SURGICAL HISTORY    Past Surgical History:   Procedure Laterality Date    Lymph node Ca removed          FAMILY HISTORY    No family history on file.     SOCIAL HISTORY                 ALLERGIES  Review of patient's allergies indicates:  No Known Allergies       REVIEW OF SYSTEMS    UNABLE DUE TO PATIENT'S UNDERLYING MENTAL STATUS    OBJECTIVE     VITAL SIGNS (Most Recent)  Temp: 96.7 °F (35.9 °C) (10/30/22 0740)  Pulse: 67 (10/30/22 0742)  Resp: 18 (10/30/22 0742)  BP: (!) 143/66 (10/30/22 0740)  SpO2: 95 % (10/30/22 0742)      MOST RECENT RECORDED NIH        Stroke Scales      INPATIENT MEDICATIONS  Scheduled Meds:   amLODIPine  10 mg Oral Daily    aspirin  81 mg Oral Daily    atorvastatin  40 mg Oral Daily    enoxaparin  40 mg Subcutaneous Daily    levothyroxine  88 mcg Oral Before breakfast    potassium chloride  10 mEq Intravenous Q1H    senna-docusate 8.6-50 mg  1 tablet Oral BID     Continuous Infusions:   dextrose 5 % and 0.45 % NaCl 75 mL/hr at 10/30/22 0726     PRN Meds:.acetaminophen, acetaminophen,  aluminum-magnesium hydroxide-simethicone, bisacodyL, dextrose 10%, dextrose 10%, glucagon (human recombinant), glucose, glucose, naloxone, ondansetron, sodium chloride 0.9%      IMAGING  CT Head Without Contrast Results for orders placed during the hospital encounter of 10/29/22    CT Head Without Contrast    Narrative  EXAMINATION:  CT HEAD WITHOUT CONTRAST    CLINICAL HISTORY:  Mental status change, unknown cause;    TECHNIQUE:  Low dose axial images were obtained through the head.  Coronal and sagittal reformations were also performed. Contrast was not administered.    COMPARISON:  None    FINDINGS:  The study is mildly limited by motion.  There is no intra or extra-axial hemorrhage.  No mass effect, edema or midline shift is present.  Mild generalized atrophy, particularly central atrophy is noted.  There is no skull fracture.  The visualized paranasal sinuses are clear.    There is 1.6 cm calcified mass in the floor of the right anterior cranial fossa, extending from the petrous temporal bone.  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  There is no edema in the adjacent temporal lobe.    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  This is likely an incidental finding.  It produces no mass effect/edema on the adjacent brain.  Otherwise, essentially negative study, aside from atrophy.      Electronically signed by: Luana Horner  Date:    10/29/2022  Time:    12:01      CT Head With Contrast No results found for this or any previous visit.      CT Head With & Without Contrast No results found for this or any previous visit.      CTA Head No results found for this or any previous visit.      CTA Head and Neck No results found for this or any previous visit.      MRI Brain Without Contrast No results found for this or any previous visit.      MRI Brain With Contrast No results found for this or any previous visit.      MRI  Brain With & Without Contrast No results found for this or any previous visit.      CXR 1 View No results found for this or any previous visit.        CARDIAC STUDIES  Results for orders placed or performed during the hospital encounter of 10/23/22   EKG 12-lead    Collection Time: 10/23/22  9:16 PM    Narrative    Test Reason : R00.1,    Vent. Rate : 049 BPM     Atrial Rate : 049 BPM     P-R Int : 162 ms          QRS Dur : 082 ms      QT Int : 490 ms       P-R-T Axes : 061 048 060 degrees     QTc Int : 442 ms    Program found technically poor ECG  Sinus bradycardia  Nonspecific ST abnormality  Borderline Abnormal ECG  No previous ECGs available  Confirmed by GABBY CHAPIN MD ([id number]) on 10/24/2022 9:13:17 AM    Referred By: EMEKA   SELF           Confirmed By:GABBY CHAPIN MD     No results found for this or any previous visit.      PHYSICAL EXAM  Physical Exam:  General: Agitated, , restrained, intermittent clling out  HEENT: unable to open patient's eyes for exam  CV: RRR  Lungs: CTAB  Abdomen: +BS, S, ND    Neurological Exam    MENTAL STATUS EXAM:  Level of alertness: Alert  Level of attention: distracted  Orientation/Awareness: unable to evaluate   Language: fluent, without aphasia    CRANIAL NERVE EXAM:  II/III: fundoscopic exam deferred, unable to perform  III/IV/VI: unable to perform  V:  masseter strength intact bilaterally  VII: no facial asymmetry noted  VIII: patient does not respond  IX/X: palate @ ML and raises symmetrically  XI: patient pulls bilaterally unable to get a shoulder shrug however strength intact  XII: tongue to midline w/out asymmetry  No dysarthria noted on exam.    MOTOR EXAM:  Bulk and Tone: normal throughout    REFLEXES:  Masseter (CN V) Not Tested  2+ in bilateral upper and lower extremities    GAIT:  Deferred for safety.       ASSESSMENT & PLAN    72-year-old gentleman with history of hypertension, Cancer, Depression, and Frontal lobe Dementia transferred from [place], who  presented with failure to thrive over a week.  Patient was placed at the facility due to combative behavior at home.     Differential includes encephalopathy, as well as medication side effect.  Workup ongoing.    - admitted to hospital medicine on the floor, with Q shift neuro checks and continuous telemetry  - EEG if patient will cooperate  - CT brain-  incidental finding of petrous apex meningioma, atrophy  - Negative for UTI  - Recommend at CT head with and without contrast  - Resume zyprexa, prozac, levothyroxine, and trileptal may need to change medications to IM or IV  - delirium precautions as inpatient  - physical, occupational and speech therapy evaluation recommended  - Telepsych evaluation  - case was discussed with Patient wife at the bedside     Dementia/Delirium recommendations:  Attempt to facilitate normal sleep-wake cycle: Keep all lights on and window shades open during the day, patient can sit up in bed or ideally out of bed if appropriate, can keep TV on. At night, close window shades, turn off TV, turn lights off and minimize intrusions.  Frequent redirection and reorientation as appropriate.  Minimize use of antipsychotics, only use if nonpharmacologic measures are ineffective.  Identify and treat potential causes such as medications and acute illness. Multiple causes are common.  Optimize physiology, environment, and medications to promote brain recovery.  Detect and treat agitation or distress with non-pharmacologic means, if possible.  Communicate diagnosis to patients and caregivers and provide ongoing support.  Attempt to prevent delirium complications such as immobility, falls, pressure sores, dehydration, malnourishment, and isolation.    Patient seen and examined today, POC discussed with Dr. Fawad Valerio.          Grecia Garay NP  [facility]  Date of Service: 10/30/2022  8:37 AM

## 2022-10-30 NOTE — CARE UPDATE
10/30/22 0742   Patient Assessment/Suction   Level of Consciousness (AVPU) responds to pain   PRE-TX-O2   O2 Device (Oxygen Therapy) room air   SpO2 95 %   Pulse Oximetry Type Intermittent   $ Pulse Oximetry - Multiple Charge Pulse Oximetry - Multiple   Pulse 67   Resp 18

## 2022-10-30 NOTE — PLAN OF CARE
[facility]  Initial Discharge Assessment       Primary Care Provider: JELLY Decker    Admission Diagnosis: Dehydration [E86.0]  Altered mental status [R41.82]  Chest pain [R07.9]  Altered mental status, unspecified altered mental status type [R41.82]    Admission Date: 10/29/2022  Expected Discharge Date:     Discharge Barriers Identified: Mental illness    Payor: MEDICARE / Plan: MEDICARE PART A & B / Product Type: Government /     Extended Emergency Contact Information  Primary Emergency Contact: FIDENCIOCALEB MOREIRAN  Mobile Phone: [phone]  Relation: Daughter  Preferred language: English   needed? No    Discharge Plan A: Psychiatric hospital  Discharge Plan B: Skilled Nursing Facility      [id number] - [place], [place] - [address] [address]  [address]  [facility] [address]  Phone: [phone] Fax: [phone]    SW spoke to patient's spouse, patient's daughter, and son in law via telephone [phone] to completed discharge planning assessment.  Patient family would like patient to transfer to a facility closer to [place], [place].  SW explained that patient is PEC/CEC hold and recommended patient for inpatient psychiatric placement once medically cleared.  Family with questions regarding patient to transfer to another inpatient psych facility closer to [place].  SW explained in detail inpatient psychiatric process and procedure.  Family with questions regarding skilled nursing facility placement.  SW explained in detail process and procedure for placement.  Patient and family verbalized understanding.       Patient was sent from [facility].  If patient transfers to [facility], family would like transfer center to attempt facilities in [facility].  Family do not wish for patient to return back to same [place].        Initial Assessment (most recent)       Adult Discharge Assessment - 10/30/22 1513          Discharge Assessment    Assessment Type Discharge  Planning Assessment     Confirmed/corrected address, phone number and insurance Yes     Confirmed Demographics Correct on Facesheet     Source of Information family     Communicated TAYLOR with patient/caregiver Date not available/Unable to determine     Lives With child(loco), adult     Facility Arrived From: [facility]     Do you expect to return to your current living situation? No     Do you have help at home or someone to help you manage your care at home? Yes     Who are your caregiver(s) and their phone number(s)? family     Prior to hospitilization cognitive status: Unable to Assess     Current cognitive status: Unable to Assess     Discharge Plan A Psychiatric hospital     Discharge Plan B Skilled Nursing Facility     DME Needed Upon Discharge  none     Discharge Plan discussed with: Adult children;Spouse/sig other     Discharge Barriers Identified Mental illness        Physical Activity    On average, how many days per week do you engage in moderate to strenuous exercise (like a brisk walk)? Patient refused     On average, how many minutes do you engage in exercise at this level? Patient refused        Financial Resource Strain    How hard is it for you to pay for the very basics like food, housing, medical care, and heating? Patient refused        Housing Stability    In the last 12 months, was there a time when you were not able to pay the mortgage or rent on time? Patient refused     In the last 12 months, was there a time when you did not have a steady place to sleep or slept in a shelter (including now)? Patient refused        Transportation Needs    In the past 12 months, has lack of transportation kept you from medical appointments or from getting medications? Patient refused     In the past 12 months, has lack of transportation kept you from meetings, work, or from getting things needed for daily living? Patient refused        Food Insecurity    Within the past 12 months, you worried that your  food would run out before you got the money to buy more. Patient refused     Within the past 12 months, the food you bought just didn't last and you didn't have money to get more. Patient refused        Stress    Do you feel stress - tense, restless, nervous, or anxious, or unable to sleep at night because your mind is troubled all the time - these days? Patient refused        Social Connections    In a typical week, how many times do you talk on the phone with family, friends, or neighbors? Patient refused     How often do you get together with friends or relatives? Patient refused     How often do you attend Rastafarian or Zoroastrian services? Patient refused     Do you belong to any clubs or organizations such as Rastafarian groups, unions, fraternal or athletic groups, or school groups? Patient refused     How often do you attend meetings of the clubs or organizations you belong to? Patient refused     Are you , , , , never , or living with a partner? Patient refused        Alcohol Use    Q1: How often do you have a drink containing alcohol? Patient refused     Q2: How many drinks containing alcohol do you have on a typical day when you are drinking? Patient refused     Q3: How often do you have six or more drinks on one occasion? Patient refused

## 2022-10-30 NOTE — HOSPITAL COURSE
Patient admitted from [facility] facility with failure to thrive and progressive psychosis. He has h/o frontal temporal dementia. Overnight he was given IV ativan and has no improvement in behavior. Patient unable to participate in a telepsych visit. Neurology was consulted on guidance to medications. Appears he was given a combination of meds in addition to his home medication and possibly polypharmacy resulted in psychosis. He does require restraints as he is a harm to himself. Will try to resume home medications if he can follow commands to swallow pills. Electrolyte replacement by IV. IVF for hypernatremia and elevated CPK >3000. Monitoring on tele due to QT prolongation. The spouse and daughter at bedside and agree to palliative care discussion in regards to goals of care and advanced care planning.     Fevers persistent - started on amp, rocephin, vanc and acyclovir for concern of CNS infection. LP completed and studies pending. Brain MRI: poor study due to motion artifact, generalized volume loss. Zyprexa dc'd, concern for NMS. Ativan IV Ordered. Restraints remain in place. Picc line placed to [facility]. TPN ordered for nutrition. Unable to take oral meds and diet due to mental status. CK trending down 1700. Na 144.     Meningitis antibiotics dc'd, ativan weaning, TPN infusing, CXR did not show infiltrates, continues to spike fevers.  Psychiatry consult placed, though not able to do if patient cannot communicate on camera. Resp culture pending, unasyn IV should cover. Continuing dantrolene. With drop in O2 sats on RA, place on supplemental O2. CK trending down. LA normal.     PEC/CEC dc'd/. Fevers resolved. Resp culture + staph - MSSA. ID, neurology and psychiatry following. Depakote IV for mood stabilization. Prefer to not try anti-psychotics and SSRi in setting of possible NMS. Palliative care had meeting with patient/family made DNR.     Overall impression is progression of already moderate  to severe dementia with new insult from NMS. Continues obtunded not following commands. TPN and restraints continued. To continue with supportive care and re-evaluate goals of care. Dantrolene weaned off. Ativan scheduled dc'd but to be used prn. Palliative to re-visit.    Family has decided to take patient home with hospice. CM helped arrange this. Morphine and ativan prn for comfort. Abx discontinued (10 day course completed with combo of abx). Labs discontinued.      Physical Exam on day of discharge:  Constitutional:       General: He is not in acute distress.     Appearance: He is ill-appearing.      Comments: In restraints   HENT:      Head: Normocephalic and atraumatic.      Mouth/Throat:      Mouth: Mucous membranes are dry.   Cardiovascular:      Rate and Rhythm: Normal rate and regular rhythm.   Pulmonary:      Effort: Pulmonary effort is normal. No respiratory distress.      Breath sounds: No wheezing.   Abdominal:      General: Abdomen is flat. Bowel sounds are normal. There is no distension.      Palpations: Abdomen is soft.      Tenderness: There is no abdominal tenderness. There is no guarding or rebound.   Musculoskeletal:         General: No swelling or tenderness.   Skin:     General: Skin is warm and dry.   Neurological:      GCS: GCS eye subscore is 1. GCS verbal subscore is 2. GCS motor subscore is 5.      Comments: Obtunded  Not able to cooperate  Making incomprehensible noises  Overall increased tone in musculature

## 2022-10-30 NOTE — NURSING
Notified FELICITY Reno NP that pt is becoming increasingly more agitated, yelling and beating legs against side rails. NP states that Dr Gibbs does not want to give any psych meds at this time.

## 2022-10-30 NOTE — ASSESSMENT & PLAN NOTE
Sharp change in status. Possibly infectious or metabolic   CT of abdomen and pelvis - left kidney mass vs complex cyst, non obstructing stone   IVF   Neurology consulted - patient unable to do tele psych visit at this time   [facility]   Neurology recs: - resume home meds- if able to follow commands and swallow properly   Restraints if danger to himself  Tele monitoring

## 2022-10-30 NOTE — PLAN OF CARE
POC discussed with patient, no evidence of learning. IV to right arm remains intact and patent with iv fluids infusing without difficulty. PEC sitter remains at patients door. Restraints remain intact without injury and checks done per protocol. Pt remains calm after receiving ordered doses of ativan. All side rails padded for patient's safety. Pt voiding in brief. Safety measures maintained with bed in low position, side rails up, and sitter at patient's door.

## 2022-10-30 NOTE — ASSESSMENT & PLAN NOTE
Follows neurologist in [place] with recent increases in meds  Trileptal level pending  Neuro recs: resume home meds

## 2022-10-30 NOTE — PROGRESS NOTES
[facility]  Progress Note    Patient Name: Ronn Caballero Jr.  MRN: [id number]  Patient Class: IP- Inpatient   Admission Date: 10/29/2022  Length of Stay: 1 days  Attending Physician: Chelo Gibbs MD  Primary Care Provider: JELLY Decker        Subjective:     Principal Problem:Dementia with behavioral disturbance        HPI:  71 yo male with h/o thyroid cancer, frontal temporal lobe dementia, HTN, HLP, depression and anxiety transferred from [facility] due to 6 days of failure to thrive. It is reported from the facility that he was PEC/CEC (expires 11/3/22) due to combative behavior at home. History comes from the patient's spouse and sister-in -law at bedside. Patient follows a neurologist in [place] and spouse reports recent change in medications include increase of zyprexa from 2.5mg to 10mg BID. He was also started on gabapentin TID. Spouse denies recent fever, chills, cough, D/N/V.  Per the MAR from [place] he received Haldol x4 IM, benadryl and ativan IV x2. The facility reports he was not eating or drinking and no witnessed episodes of diarrhea or vomiting. On our workup he has Na153, elevated  AST, hematuria, WBC 17k with 2% bands, lactic acid 1.2. No source of infection on UA or chest xray. On exam he does show discomfort in RUQ and midepigastrium. Abd CT and lipase pending. Troponin mildly elevated 0.056 with some EKG changes.     Hospital medicine consulted for further medical management. Physical restraints will be used to protect patient as he is pulling at cardiac leads and peripheral IV. Neurology consulted. Needs tele sitter.       Overview/Hospital Course:  Patient admitted from [facility] facility with failure to thrive and progressive psychosis. He has h/o frontal temporal dementia. Overnight he was given IV ativan and has no improvement in behavior. Patient unable to participate in a telepsych visit.  Neurology was consulted on guidance to medications. Appears he was given a combination of meds in addition to his home medication and possibly polypharmacy resulted in psychosis. He does require restraints as he is a harm to himself. Will try to resume home medications if he can follow commands to swallow pills. Electrolyte replacement by IV. IVF for hypernatremia and elevated CP >3000. Monitoring on tele due to QT prolongation. The spouse and daughter at bedside and agree to palliative care discussion in regards to goals of care and advanced care planning.       Interval History: CEC with sitter present, in restraints and highly agitated, not following commands, T 100.5 last night, not able to redirect patient to calm down     Review of Systems   Unable to perform ROS: Psychiatric disorder   Objective:     Vital Signs (Most Recent):  Temp: 98.3 °F (36.8 °C) (10/30/22 1143)  Pulse: 74 (10/30/22 1143)  Resp: 18 (10/30/22 1143)  BP: 119/74 (10/30/22 1143)  SpO2: 97 % (10/30/22 1143) Vital Signs (24h Range):  Temp:  [96.7 °F (35.9 °C)-100.5 °F (38.1 °C)] 98.3 °F (36.8 °C)  Pulse:  [] 74  Resp:  [16-20] 18  SpO2:  [95 %-100 %] 97 %  BP: (110-175)/(56-90) 119/74     Weight: 56.7 kg (125 lb)  Body mass index is 19.58 kg/m².  No intake or output data in the 24 hours ending 10/30/22 1413   Physical Exam  Constitutional:       General: He is in acute distress.      Comments: Restless in restraints    Cardiovascular:      Rate and Rhythm: Normal rate and regular rhythm.      Heart sounds: No murmur heard.  Pulmonary:      Effort: Pulmonary effort is normal.      Breath sounds: Normal breath sounds. No wheezing.   Abdominal:      General: There is no distension.      Palpations: There is no mass.   Musculoskeletal:         General: Normal range of motion.   Skin:     General: Skin is warm and dry.   Neurological:      Mental Status: He is disoriented.      Comments: Not able to follow commands due to agitation     Psychiatric:      Comments: Yelling, screaming, cussing        Significant Labs: All pertinent labs within the past 24 hours have been reviewed.  Blood Culture:   Recent Labs   Lab 10/29/22  1542 10/29/22  1543   LABBLOO No Growth to date No Growth to date     CBC:   Recent Labs   Lab 10/29/22  1121 10/30/22  0428   WBC 17.42* 9.24   HGB 13.7* 11.0*   HCT 41.9 33.4*    217     CMP:   Recent Labs   Lab 10/29/22  1121 10/30/22  0428   * 150*   K 4.3 3.3*   * 120*   CO2 24 22*    112*   BUN 42* 30*   CREATININE 1.2 0.9   CALCIUM 10.7* 9.0   PROT 7.2 5.6*   ALBUMIN 3.9 2.9*   BILITOT 0.9 0.7   ALKPHOS 102 83   * 108*   ALT 42 44   ANIONGAP 14 8     Cardiac Markers: No results for input(s): CKMB, MYOGLOBIN, BNP, TROPISTAT in the last 48 hours.  Lactic Acid:   Recent Labs   Lab 10/29/22  1423   LACTATE 1.2     Lipase:   Recent Labs   Lab 10/29/22  1543   LIPASE 7     Lipid Panel: No results for input(s): CHOL, HDL, LDLCALC, TRIG, CHOLHDL in the last 48 hours.  Magnesium:   Recent Labs   Lab 10/30/22  0428   MG 2.1     POCT Glucose: No results for input(s): POCTGLUCOSE in the last 48 hours.  Troponin:   Recent Labs   Lab 10/29/22  1232 10/29/22  1656 10/29/22  2112   TROPONINI 0.056* 0.067* 0.078*     TSH:   Recent Labs   Lab 10/23/22  1415   TSH 0.976     Urine Studies:   Recent Labs   Lab 10/29/22  1119   COLORU Yellow   APPEARANCEUA Clear   PHUR 6.0   SPECGRAV >=1.030*   PROTEINUA 1+*   GLUCUA Negative   KETONESU 1+*   BILIRUBINUA 1+*   OCCULTUA 3+*   NITRITE Negative   UROBILINOGEN 2.0-3.0*   LEUKOCYTESUR Negative   RBCUA 8*   WBCUA 2   BACTERIA Occasional   HYALINECASTS 0       Significant Imaging: I have reviewed all pertinent imaging results/findings within the past 24 hours.      Assessment/Plan:      * Dementia with behavioral disturbance  Sharp change in status. Possibly infectious or metabolic   CT of abdomen and pelvis - left kidney mass vs complex cyst, non obstructing stone    IVF   Neurology consulted - patient unable to do tele psych visit at this time   No haldol given here  Hold ativan   Neurology recs: - resume home meds- if able to follow commands and swallow properly   Restraints if danger to himself  Tele monitoring       Bandemia  Leukocytosis - unknown etiology  LA 1.2 and no fever  UA - no signs of infections  CXR - clear   CT of abdomen  Trend CBC - WBC normal range and no bandemia on repeat - with IVF      Frontotemporal dementia  Follows neurologist in [place] with recent increases in meds  Trileptal level pending  Neuro recs: resume home meds       Postoperative hypothyroidism  Resume synthroid   FT4 - normal       Hypernatremia    d5 .45 NS IVF  BMp in am     Failure to thrive in adult  IVF until mental status is appropriate for oral diet  Progression of dementia  Palliative care discussion       essential hypertension  If passes swallow eval, resume home norvasc  IV labetalol PRN         VTE Risk Mitigation (From admission, onward)         Ordered     enoxaparin injection 40 mg  Daily         10/29/22 1513     IP VTE HIGH RISK PATIENT  Once         10/29/22 1513     Place sequential compression device  Until discontinued         10/29/22 1513                Discharge Planning   TAYLOR:      Code Status: Full Code   Is the patient medically ready for discharge?:     Reason for patient still in hospital (select all that apply): Patient unstable                     Chelo Gibbs MD  Department of Hospital Medicine   [facility]

## 2022-10-31 ENCOUNTER — OUTSIDE PLACE OF SERVICE (OUTPATIENT)
Dept: INFECTIOUS DISEASES | Facility: CLINIC | Age: 72
End: 2022-10-31
Payer: MEDICARE

## 2022-10-31 DIAGNOSIS — R50.9 FEVER: Primary | ICD-10-CM

## 2022-10-31 PROBLEM — Z71.89 GOALS OF CARE, COUNSELING/DISCUSSION: Status: ACTIVE | Noted: 2022-10-31

## 2022-10-31 LAB
ALBUMIN SERPL BCP-MCNC: 2.8 G/DL (ref 3.5–5.2)
ALP SERPL-CCNC: 85 U/L (ref 55–135)
ALT SERPL W/O P-5'-P-CCNC: 48 U/L (ref 10–44)
ANION GAP SERPL CALC-SCNC: 7 MMOL/L (ref 8–16)
AST SERPL-CCNC: 89 U/L (ref 10–40)
BASOPHILS # BLD AUTO: 0.04 K/UL (ref 0–0.2)
BASOPHILS NFR BLD: 0.5 % (ref 0–1.9)
BILIRUB SERPL-MCNC: 0.7 MG/DL (ref 0.1–1)
BILIRUB UR QL STRIP: NEGATIVE
BUN SERPL-MCNC: 19 MG/DL (ref 8–23)
CALCIUM SERPL-MCNC: 9.1 MG/DL (ref 8.7–10.5)
CHLORIDE SERPL-SCNC: 120 MMOL/L (ref 95–110)
CK SERPL-CCNC: 1766 U/L (ref 20–200)
CLARITY UR: CLEAR
CO2 SERPL-SCNC: 22 MMOL/L (ref 23–29)
COLOR UR: YELLOW
CREAT SERPL-MCNC: 0.8 MG/DL (ref 0.5–1.4)
DIFFERENTIAL METHOD: ABNORMAL
EOSINOPHIL # BLD AUTO: 0.2 K/UL (ref 0–0.5)
EOSINOPHIL NFR BLD: 2.5 % (ref 0–8)
ERYTHROCYTE [DISTWIDTH] IN BLOOD BY AUTOMATED COUNT: 12.8 % (ref 11.5–14.5)
EST. GFR  (NO RACE VARIABLE): >60 ML/MIN/1.73 M^2
GLUCOSE SERPL-MCNC: 105 MG/DL (ref 70–110)
GLUCOSE UR QL STRIP: NEGATIVE
HCT VFR BLD AUTO: 34.4 % (ref 40–54)
HGB BLD-MCNC: 11.5 G/DL (ref 14–18)
HGB UR QL STRIP: ABNORMAL
IMM GRANULOCYTES # BLD AUTO: 0.03 K/UL (ref 0–0.04)
IMM GRANULOCYTES NFR BLD AUTO: 0.4 % (ref 0–0.5)
KETONES UR QL STRIP: NEGATIVE
LEUKOCYTE ESTERASE UR QL STRIP: NEGATIVE
LYMPHOCYTES # BLD AUTO: 0.8 K/UL (ref 1–4.8)
LYMPHOCYTES NFR BLD: 10.6 % (ref 18–48)
MAGNESIUM SERPL-MCNC: 2 MG/DL (ref 1.6–2.6)
MCH RBC QN AUTO: 30.7 PG (ref 27–31)
MCHC RBC AUTO-ENTMCNC: 33.4 G/DL (ref 32–36)
MCV RBC AUTO: 92 FL (ref 82–98)
MONOCYTES # BLD AUTO: 1.3 K/UL (ref 0.3–1)
MONOCYTES NFR BLD: 16.8 % (ref 4–15)
NEUTROPHILS # BLD AUTO: 5.4 K/UL (ref 1.8–7.7)
NEUTROPHILS NFR BLD: 69.2 % (ref 38–73)
NITRITE UR QL STRIP: NEGATIVE
NRBC BLD-RTO: 0 /100 WBC
PH UR STRIP: 6 [PH] (ref 5–8)
PHOSPHATE SERPL-MCNC: 2.5 MG/DL (ref 2.7–4.5)
PLATELET # BLD AUTO: 221 K/UL (ref 150–450)
PMV BLD AUTO: 11.1 FL (ref 9.2–12.9)
POTASSIUM SERPL-SCNC: 3.2 MMOL/L (ref 3.5–5.1)
PROT SERPL-MCNC: 5.5 G/DL (ref 6–8.4)
PROT UR QL STRIP: NEGATIVE
RBC # BLD AUTO: 3.74 M/UL (ref 4.6–6.2)
SODIUM SERPL-SCNC: 149 MMOL/L (ref 136–145)
SP GR UR STRIP: 1.01 (ref 1–1.03)
URN SPEC COLLECT METH UR: ABNORMAL
UROBILINOGEN UR STRIP-ACNC: NEGATIVE EU/DL
WBC # BLD AUTO: 7.73 K/UL (ref 3.9–12.7)

## 2022-10-31 PROCEDURE — 63600175 PHARM REV CODE 636 W HCPCS: Performed by: STUDENT IN AN ORGANIZED HEALTH CARE EDUCATION/TRAINING PROGRAM

## 2022-10-31 PROCEDURE — 25000003 PHARM REV CODE 250: Performed by: NURSE PRACTITIONER

## 2022-10-31 PROCEDURE — 94761 N-INVAS EAR/PLS OXIMETRY MLT: CPT

## 2022-10-31 PROCEDURE — S0166 INJ OLANZAPINE 2.5MG: HCPCS | Performed by: HOSPITALIST

## 2022-10-31 PROCEDURE — 25000003 PHARM REV CODE 250: Performed by: HOSPITALIST

## 2022-10-31 PROCEDURE — 12000002 HC ACUTE/MED SURGE SEMI-PRIVATE ROOM

## 2022-10-31 PROCEDURE — 82550 ASSAY OF CK (CPK): CPT | Performed by: INTERNAL MEDICINE

## 2022-10-31 PROCEDURE — 80053 COMPREHEN METABOLIC PANEL: CPT | Performed by: HOSPITALIST

## 2022-10-31 PROCEDURE — 63600175 PHARM REV CODE 636 W HCPCS: Performed by: HOSPITALIST

## 2022-10-31 PROCEDURE — 36415 COLL VENOUS BLD VENIPUNCTURE: CPT | Performed by: INTERNAL MEDICINE

## 2022-10-31 PROCEDURE — 99223 PR INITIAL HOSPITAL CARE,LEVL III: ICD-10-PCS | Mod: S$GLB,,, | Performed by: STUDENT IN AN ORGANIZED HEALTH CARE EDUCATION/TRAINING PROGRAM

## 2022-10-31 PROCEDURE — 36415 COLL VENOUS BLD VENIPUNCTURE: CPT | Performed by: HOSPITALIST

## 2022-10-31 PROCEDURE — 99223 1ST HOSP IP/OBS HIGH 75: CPT | Mod: S$GLB,,, | Performed by: STUDENT IN AN ORGANIZED HEALTH CARE EDUCATION/TRAINING PROGRAM

## 2022-10-31 PROCEDURE — U0005 INFEC AGEN DETEC AMPLI PROBE: HCPCS | Performed by: HOSPITALIST

## 2022-10-31 PROCEDURE — 99223 PR INITIAL HOSPITAL CARE,LEVL III: ICD-10-PCS | Mod: ,,, | Performed by: INTERNAL MEDICINE

## 2022-10-31 PROCEDURE — 99223 1ST HOSP IP/OBS HIGH 75: CPT | Mod: ,,, | Performed by: INTERNAL MEDICINE

## 2022-10-31 PROCEDURE — U0003 INFECTIOUS AGENT DETECTION BY NUCLEIC ACID (DNA OR RNA); SEVERE ACUTE RESPIRATORY SYNDROME CORONAVIRUS 2 (SARS-COV-2) (CORONAVIRUS DISEASE [COVID-19]), AMPLIFIED PROBE TECHNIQUE, MAKING USE OF HIGH THROUGHPUT TECHNOLOGIES AS DESCRIBED BY CMS-2020-01-R: HCPCS | Performed by: HOSPITALIST

## 2022-10-31 PROCEDURE — 25000003 PHARM REV CODE 250: Performed by: STUDENT IN AN ORGANIZED HEALTH CARE EDUCATION/TRAINING PROGRAM

## 2022-10-31 PROCEDURE — 84100 ASSAY OF PHOSPHORUS: CPT | Performed by: HOSPITALIST

## 2022-10-31 PROCEDURE — 83735 ASSAY OF MAGNESIUM: CPT | Performed by: HOSPITALIST

## 2022-10-31 PROCEDURE — 92610 EVALUATE SWALLOWING FUNCTION: CPT

## 2022-10-31 PROCEDURE — 25500020 PHARM REV CODE 255

## 2022-10-31 PROCEDURE — 85025 COMPLETE CBC W/AUTO DIFF WBC: CPT | Performed by: HOSPITALIST

## 2022-10-31 PROCEDURE — 63600175 PHARM REV CODE 636 W HCPCS: Performed by: NURSE PRACTITIONER

## 2022-10-31 PROCEDURE — S0166 INJ OLANZAPINE 2.5MG: HCPCS | Performed by: NURSE PRACTITIONER

## 2022-10-31 PROCEDURE — 31720 CLEARANCE OF AIRWAYS: CPT

## 2022-10-31 PROCEDURE — 63600175 PHARM REV CODE 636 W HCPCS: Performed by: INTERNAL MEDICINE

## 2022-10-31 PROCEDURE — 81003 URINALYSIS AUTO W/O SCOPE: CPT | Performed by: HOSPITALIST

## 2022-10-31 RX ORDER — VANCOMYCIN HCL IN 5 % DEXTROSE 1G/250ML
1000 PLASTIC BAG, INJECTION (ML) INTRAVENOUS
Status: DISCONTINUED | OUTPATIENT
Start: 2022-10-31 | End: 2022-11-01

## 2022-10-31 RX ORDER — ACETAMINOPHEN 650 MG/1
650 SUPPOSITORY RECTAL EVERY 4 HOURS PRN
Status: DISCONTINUED | OUTPATIENT
Start: 2022-10-31 | End: 2022-11-11 | Stop reason: HOSPADM

## 2022-10-31 RX ORDER — SCOLOPAMINE TRANSDERMAL SYSTEM 1 MG/1
1 PATCH, EXTENDED RELEASE TRANSDERMAL
Status: DISCONTINUED | OUTPATIENT
Start: 2022-10-31 | End: 2022-11-11 | Stop reason: HOSPADM

## 2022-10-31 RX ORDER — ACETAMINOPHEN 650 MG/1
650 SUPPOSITORY RECTAL ONCE
Status: COMPLETED | OUTPATIENT
Start: 2022-10-31 | End: 2022-10-31

## 2022-10-31 RX ORDER — AMPICILLIN 2 G/1
2 INJECTION, POWDER, FOR SOLUTION INTRAVENOUS
Status: DISCONTINUED | OUTPATIENT
Start: 2022-10-31 | End: 2022-10-31

## 2022-10-31 RX ORDER — OLANZAPINE 10 MG/2ML
5 INJECTION, POWDER, FOR SOLUTION INTRAMUSCULAR ONCE AS NEEDED
Status: COMPLETED | OUTPATIENT
Start: 2022-10-31 | End: 2022-10-31

## 2022-10-31 RX ORDER — POTASSIUM CHLORIDE 7.45 MG/ML
10 INJECTION INTRAVENOUS
Status: COMPLETED | OUTPATIENT
Start: 2022-10-31 | End: 2022-10-31

## 2022-10-31 RX ORDER — LORAZEPAM 2 MG/ML
1 INJECTION INTRAMUSCULAR EVERY 8 HOURS
Status: DISCONTINUED | OUTPATIENT
Start: 2022-10-31 | End: 2022-11-02

## 2022-10-31 RX ORDER — LABETALOL HYDROCHLORIDE 5 MG/ML
20 INJECTION, SOLUTION INTRAVENOUS EVERY 6 HOURS PRN
Status: DISCONTINUED | OUTPATIENT
Start: 2022-10-31 | End: 2022-11-11 | Stop reason: HOSPADM

## 2022-10-31 RX ORDER — LEVOTHYROXINE SODIUM 20 UG/ML
44 INJECTION, SOLUTION INTRAVENOUS DAILY
Status: DISCONTINUED | OUTPATIENT
Start: 2022-11-01 | End: 2022-11-11 | Stop reason: HOSPADM

## 2022-10-31 RX ADMIN — SCOPALAMINE 1 PATCH: 1 PATCH, EXTENDED RELEASE TRANSDERMAL at 01:10

## 2022-10-31 RX ADMIN — OLANZAPINE 5 MG: 10 INJECTION, POWDER, LYOPHILIZED, FOR SOLUTION INTRAMUSCULAR at 12:10

## 2022-10-31 RX ADMIN — CEFTRIAXONE 1 G: 1 INJECTION, SOLUTION INTRAVENOUS at 04:10

## 2022-10-31 RX ADMIN — ACYCLOVIR SODIUM 330 MG: 50 INJECTION, SOLUTION INTRAVENOUS at 07:10

## 2022-10-31 RX ADMIN — ACETAMINOPHEN 650 MG: 650 SUPPOSITORY RECTAL at 04:10

## 2022-10-31 RX ADMIN — ACETAMINOPHEN 650 MG: 650 SUPPOSITORY RECTAL at 01:10

## 2022-10-31 RX ADMIN — LORAZEPAM 1 MG: 2 INJECTION INTRAMUSCULAR; INTRAVENOUS at 09:10

## 2022-10-31 RX ADMIN — IOHEXOL 75 ML: 350 INJECTION, SOLUTION INTRAVENOUS at 02:10

## 2022-10-31 RX ADMIN — ENOXAPARIN SODIUM 40 MG: 100 INJECTION SUBCUTANEOUS at 04:10

## 2022-10-31 RX ADMIN — POTASSIUM CHLORIDE 10 MEQ: 7.46 INJECTION, SOLUTION INTRAVENOUS at 12:10

## 2022-10-31 RX ADMIN — POTASSIUM CHLORIDE 10 MEQ: 7.46 INJECTION, SOLUTION INTRAVENOUS at 01:10

## 2022-10-31 RX ADMIN — POTASSIUM CHLORIDE 10 MEQ: 7.46 INJECTION, SOLUTION INTRAVENOUS at 10:10

## 2022-10-31 RX ADMIN — AMPICILLIN SODIUM 2 G: 2 INJECTION, POWDER, FOR SOLUTION INTRAMUSCULAR; INTRAVENOUS at 08:10

## 2022-10-31 RX ADMIN — AZITHROMYCIN MONOHYDRATE 500 MG: 500 INJECTION, POWDER, LYOPHILIZED, FOR SOLUTION INTRAVENOUS at 01:10

## 2022-10-31 RX ADMIN — VANCOMYCIN HYDROCHLORIDE 1000 MG: 1 INJECTION, POWDER, LYOPHILIZED, FOR SOLUTION INTRAVENOUS at 05:10

## 2022-10-31 RX ADMIN — CEFTRIAXONE 1 G: 1 INJECTION, SOLUTION INTRAVENOUS at 12:10

## 2022-10-31 RX ADMIN — POTASSIUM CHLORIDE 10 MEQ: 7.46 INJECTION, SOLUTION INTRAVENOUS at 09:10

## 2022-10-31 NOTE — ASSESSMENT & PLAN NOTE
IVF until mental status is appropriate for oral diet  Progression of dementia  Palliative care discussion     If neuro status continues, will need to place NGT for meds and nutrition      No

## 2022-10-31 NOTE — CONSULTS
[facility]  Palliative Medicine  Consult Note    Patient Name: Ronn Caballero Jr.  MRN: [id number]  Admission Date: 10/29/2022  Hospital Length of Stay: 2 days  Code Status: DNR   Attending Provider: Chelo Gibbs MD  Consulting Provider: Yung Posada MD  Primary Care Physician: JELLY Decker  Principal Problem:Dementia with behavioral disturbance    Patient information was obtained from spouse/SO, past medical records and primary team.      Inpatient consult to Palliative Care  Consult performed by: Yung Posada MD  Consult ordered by: Chelo Gibbs MD      Assessment/Plan:     Goals of care, counseling/discussion  ROSIE Joyner and I met with Mr. Caballero at bedside. He lacks capacity for complex medical decision making. We spoke with his wife outside of his room.    I introduce our team and our role as a Palliative care team; she was agreeable to speaking.    We discussed his background, medical issues, prognosis and values in great detail.    Briefly they have been  for 51 years. 3 children and 6 grandchildren; he particularly adores his youngest grandchild. They have a [employer]ian jasbir.    She was able to very accurately describe his medical course up to this point. 2 weeks ago he was his typical self, then he had an outburst and has not been the same since.     She and he are hopeful with more time and supportive measures that he may recover. He would be willing to pursue any reasonably available measure to potentially recover. She also understands his current condition may be a new baseline; in this circumstance she understands he would not live a long time. At this point she understands his prognosis is very uncertain.     As a family they have discussed code status and desire to maintain a DNR code status. I have placed this order. I discussed the risks and benefits; she is very clear on a DNR code status.    I did take a moment to discuss hospice and when  "that type of care might be appropriate. Hospice is not aligned with his goals today.    We spoke a bit back and forth. Ultimately I recommended that we continue to supportive measures and taking his care one day at a time. She agreed and agreed to me meeting with her again.    I updated Dr. Thompson on the above.    I hope I have been helpful. I will continue to follow.          Thank you for your consult. I will follow-up with patient. Please contact us if you have any additional questions.    Subjective:     HPI:   Per admission H&P:    "73 yo male with h/o thyroid cancer, frontal temporal lobe dementia, HTN, HLP, depression and anxiety transferred from [facility] due to 6 days of failure to thrive. It is reported from the facility that he was PEC/CEC (expires 11/3/22) due to combative behavior at home. History comes from the patient's spouse and sister-in -law at bedside. Patient follows a neurologist in [place] and spouse reports recent change in medications include increase of zyprexa from 2.5mg to 10mg BID. He was also started on gabapentin TID. Spouse denies recent fever, chills, cough, D/N/V.  Per the MAR from [facility] he received Haldol x4 IM, benadryl and ativan IV x2. The facility reports he was not eating or drinking and no witnessed episodes of diarrhea or vomiting. On our workup he has Na153, elevated  AST, hematuria, WBC 17k with 2% bands, lactic acid 1.2. No source of infection on UA or chest xray. On exam he does show discomfort in RUQ and midepigastrium. Abd CT and lipase pending. Troponin mildly elevated 0.056 with some EKG changes. "     Since admission his work up has been largely unrevealing. He remains altered and combative when he is not sleeping. Neurology planning for LP. At this point his prognosis is guarded and potentially quite poor if his mentation does not improve. I have been asked to assist with goals of care.      Hospital Course:  No notes on " file    Interval History: see HPI    Past Medical History:   Diagnosis Date    Cancer     Dementia     Depression     Hypertension        Past Surgical History:   Procedure Laterality Date    Lymph node Ca removed         Review of patient's allergies indicates:  No Known Allergies    Medications:  Continuous Infusions:   dextrose 5 % and 0.45 % NaCl 75 mL/hr at 10/31/22 0600     Scheduled Meds:   amLODIPine  10 mg Oral Daily    aspirin  81 mg Oral Daily    atorvastatin  40 mg Oral Daily    azithromycin  500 mg Intravenous Q24H    cefTRIAXone (ROCEPHIN) IVPB  1 g Intravenous Q24H    enoxaparin  40 mg Subcutaneous Daily    FLUoxetine  40 mg Oral Daily    levothyroxine  88 mcg Oral Before breakfast    OXcarbazepine  300 mg Oral BID    scopolamine  1 patch Transdermal Q3 Days    senna-docusate 8.6-50 mg  1 tablet Oral BID     PRN Meds:acetaminophen, acetaminophen, aluminum-magnesium hydroxide-simethicone, bisacodyL, dextrose 10%, dextrose 10%, glucagon (human recombinant), glucose, glucose, labetaloL, naloxone, ondansetron, sodium chloride 0.9%    Family History    None       Tobacco Use    Smoking status: Not on file    Smokeless tobacco: Not on file   Substance and Sexual Activity    Alcohol use: Not on file    Drug use: Not on file    Sexual activity: Not on file       Review of Systems   Unable to perform ROS: Mental status change   Objective:     Vital Signs (Most Recent):  Temp: 97.1 °F (36.2 °C) (10/31/22 1238)  Pulse: 92 (10/31/22 1300)  Resp: 20 (10/31/22 1300)  BP: (!) 163/90 (10/31/22 1238)  SpO2: 95 % (10/31/22 1238)   Vital Signs (24h Range):  Temp:  [97.1 °F (36.2 °C)-101 °F (38.3 °C)] 97.1 °F (36.2 °C)  Pulse:  [] 92  Resp:  [18-20] 20  SpO2:  [95 %-98 %] 95 %  BP: (106-182)/(56-90) 163/90     Weight: 56.7 kg (125 lb)  Body mass index is 19.58 kg/m².    Physical Exam  Constitutional:       General: He is in acute distress.      Appearance: He is ill-appearing. He is not toxic-appearing.   HENT:       Head: Normocephalic and atraumatic.      Right Ear: External ear normal.      Left Ear: External ear normal.      Mouth/Throat:      Mouth: Mucous membranes are dry.      Pharynx: Oropharynx is clear. No oropharyngeal exudate or posterior oropharyngeal erythema.   Eyes:      General:         Right eye: No discharge.         Left eye: No discharge.      Extraocular Movements: Extraocular movements intact.   Cardiovascular:      Rate and Rhythm: Normal rate and regular rhythm.   Pulmonary:      Effort: Pulmonary effort is normal. No respiratory distress.      Breath sounds: No wheezing.   Abdominal:      General: There is no distension.      Palpations: Abdomen is soft.   Musculoskeletal:         General: No swelling or tenderness.      Cervical back: Neck supple. No tenderness.   Skin:     General: Skin is warm and dry.   Neurological:      Mental Status: He is alert. He is disoriented.       Review of Symptoms      Symptom Assessment (ESAS 0-10 Scale)  Unable to complete assessment due to Mental status change         Pain Assessment in Advanced Demential Scale (PAINAD)   Breathing - Independent of vocalization:  0  Negative vocalization:  1  Facial expression:  1  Body language:  1  Consolability:  2  Total:  5    Performance Status:  20    Living Arrangements:  Lives with spouse     Time-Based Charting:  Yes  Chart Review: 25 minutes  Face to Face: 75 minutes    Total Time Spent: 100 minutes      Advance Care Planning   Advance Directives:   Do Not Resuscitate Status: Yes      Decision Making:  Family answered questions  Goals of Care: The family endorses that what is most important right now is to focus on improvement in condition but with limits to invasive therapies and comfort and QOL     Accordingly, we have decided that the best plan to meet the patient's goals includes continuing with treatment       Significant Labs: BMP:   Recent Labs   Lab 10/31/22  0440      *   K 3.2*   *   CO2 22*    BUN 19   CREATININE 0.8   CALCIUM 9.1   MG 2.0     CBC:   Recent Labs   Lab 10/30/22  0428 10/31/22  0440   WBC 9.24 7.73   HGB 11.0* 11.5*   HCT 33.4* 34.4*    221     CBC:   Recent Labs   Lab 10/31/22  0440   WBC 7.73   HGB 11.5*   HCT 34.4*   MCV 92        BMP:  Recent Labs   Lab 10/31/22  0440      *   K 3.2*   *   CO2 22*   BUN 19   CREATININE 0.8   CALCIUM 9.1   MG 2.0     LFT:  Lab Results   Component Value Date    AST 89 (H) 10/31/2022    ALKPHOS 85 10/31/2022    BILITOT 0.7 10/31/2022     Albumin:   Albumin   Date Value Ref Range Status   10/31/2022 2.8 (L) 3.5 - 5.2 g/dL Final     Protein:   Total Protein   Date Value Ref Range Status   10/31/2022 5.5 (L) 6.0 - 8.4 g/dL Final     Lactic acid:   Lab Results   Component Value Date    LACTATE 1.2 10/29/2022       Significant Imaging: I have reviewed all pertinent imaging results/findings within the past 24 hours.        > 50% of 100 min visit spent in chart review, face to face discussion of goals of care,  symptom assessment, coordination of care and emotional support.    Yung Posada MD  Palliative Medicine  [facility]

## 2022-10-31 NOTE — PROGRESS NOTES
Pharmacokinetic Initial Assessment: IV Vancomycin    Assessment/Plan:    Initiate intravenous vancomycin with a dose of 1000 mg every 12 hours  Desired empiric serum trough concentration is 15 to 20 mcg/mL  Draw vancomycin trough level 60 min prior to third dose on 11/1 at approximately 1530  Pharmacy will continue to follow and monitor vancomycin.      Please contact pharmacy at extension [phone] with any questions regarding this assessment.     Thank you for the consult,   Thomas Gonzalezverna       Patient brief summary:  Ronn Caballero Jr. is a 72 y.o. male initiated on antimicrobial therapy with IV Vancomycin for treatment of suspected meningitis    Drug Allergies:   Review of patient's allergies indicates:  No Known Allergies    Actual Body Weight:   56.7    Renal Function:   Estimated Creatinine Clearance: 68.9 mL/min (based on SCr of 0.8 mg/dL).,     Dialysis Method (if applicable):  N/A    CBC (last 72 hours):  Recent Labs   Lab Result Units 10/29/22  1121 10/30/22  0428 10/31/22  0440   WBC K/uL 17.42* 9.24 7.73   Hemoglobin g/dL 13.7* 11.0* 11.5*   Hematocrit % 41.9 33.4* 34.4*   Platelets K/uL 274 217 221   Gran % % 85.0* 73.9* 69.2   Lymph % % 3.0* 7.9* 10.6*   Mono % % 10.0 16.2* 16.8*   Eosinophil % % 0.0 1.2 2.5   Basophil % % 0.0 0.5 0.5   Differential Method  Manual Automated Automated       Metabolic Panel (last 72 hours):  Recent Labs   Lab Result Units 10/29/22  1119 10/29/22  1121 10/30/22  0428 10/31/22  0440   Sodium mmol/L  --  153* 150* 149*   Potassium mmol/L  --  4.3 3.3* 3.2*   Chloride mmol/L  --  115* 120* 120*   CO2 mmol/L  --  24 22* 22*   Glucose mg/dL  --  107 112* 105   Glucose, UA  Negative  --   --   --    BUN mg/dL  --  42* 30* 19   Creatinine mg/dL  --  1.2 0.9 0.8   Albumin g/dL  --  3.9 2.9* 2.8*   Total Bilirubin mg/dL  --  0.9 0.7 0.7   Alkaline Phosphatase U/L  --  102 83 85   AST U/L  --  104* 108* 89*   ALT U/L  --  42 44 48*   Magnesium mg/dL  --   --  2.1 2.0   Phosphorus  mg/dL  --   --  2.0* 2.5*       Drug levels (last 3 results):  No results for input(s): VANCOMYCINRA, VANCORANDOM, VANCOMYCINPE, VANCOPEAK, VANCOMYCINTR, VANCOTROUGH in the last 72 hours.    Microbiologic Results:  Microbiology Results (last 7 days)       Procedure Component Value Units Date/Time    Blood culture (site 2) [[id number]] Collected: 10/29/22 1543    Order Status: Completed Specimen: Blood Updated: 10/31/22 0613     Blood Culture, Routine No Growth to date      No Growth to date    Narrative:      Site # 2, aerobic only    Blood culture (site 1) [[id number]] Collected: 10/29/22 1542    Order Status: Completed Specimen: Blood Updated: 10/31/22 0613     Blood Culture, Routine No Growth to date      No Growth to date    Narrative:      Site # 1, aerobic and anaerobic

## 2022-10-31 NOTE — PT/OT/SLP EVAL
Speech Language Pathology Evaluation  Bedside Swallow    Patient Name:  Ronn Caballero Jr.   MRN:  [id number]  Admitting Diagnosis: Dementia with behavioral disturbance    Recommendations:                 General Recommendations:   ongoing swallow evaluation  Diet recommendations:  NPO, NPO   Aspiration Precautions:  keep HOB elevated at least 30° at all time, oral suctioning as needed and Frequent oral care   General Precautions: Standard, aspiration, fall    History:     Past Medical History:   Diagnosis Date    Cancer     Dementia     Depression     Hypertension        Past Surgical History:   Procedure Laterality Date    Lymph node Ca removed         Social History: Patient lives with spouse. [facility] x1 week 2° frontotemporal dementia with combative behavior.     Prior Intubation HX:  None this admit    Modified Barium Swallow: None in Epic    Imaging:  CT Abdomen Pelvis  Without Contrast   Final Result   Abnormal      Study limited by motion, demonstrating a complicated cyst or solid mass extending from the lower pole of the left kidney.  This should be worked up further with a study with IV contrast when the patient is able to remain still.      Nonobstructing right renal calculus.      Constipation.      This report was flagged in Epic as abnormal.         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  This is likely an incidental finding.  It produces no mass effect/edema on the adjacent brain.  Otherwise, essentially negative study, aside from atrophy.         Electronically signed by: Luana Horner   Date:    10/29/2022   Time:    12:01      X-Ray Chest AP Portable   Final Result      No acute cardiopulmonary disease.         Electronically signed by: Luana Horner  "  Date:    10/29/2022   Time:    11:35      X-Ray Chest 1 View    (Results Pending)   CTA Head and Neck (xpd)    (Results Pending)        Prior diet: Prior to admission to [facility], wife reports pt was consuming a soft diet at home with good appetite. Poor appetite/refusing to eat /drink at [facility].    Subjective     Wife shares, "They said he was refusing to eat and drink but there was very poor communication with that facility."     Respiratory Status: Room air    Objective:   Pt seen for clinical swallow evaluation. Pt asleep upon entering room. Open mouth breathing with wet breath sounds noted. History obtained from wife. 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 PEG removed November 2012.     Pt unarousable to repeated verbal, tactile and gustatory stimulation. Mumbles briefly but does not open eyes or follow commands. Thick secretions noted in oral cavity; oral suctioning provided. Pt did not demonstrate oral readiness with ice chip circled to lips. PO trials deferred.     Oral Musculature Evaluation  Oral Musculature: unable to assess due to poor participation/comprehension  Secretion Management:  (sticky secretions noted to velum and posterior tongue)  "Woody" L neck with reduced ROM    Bedside Swallow Eval:   Consistencies Assessed: PO trials deferred.     Treatment: Wife educated re: results/recs of evaluation, NPO, SLP role and POC. Receptive to information provided.     Assessment:     Ronn Caballero Jr. is a 72 y.o. male with an SLP diagnosis of Dysphagia. 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 PEG removed 11/2012. Chronic dysphagia since that time and on soft diet at home.     Orders received for clinical swallow evaluation. Pt not adequately alert for PO at this time. REC NPO, including medication. Oral suctioning as needed. Will continue to follow for ongoing swallow " assessment     Goals:   Multidisciplinary Problems       SLP Goals          Problem: SLP    Goal Priority Disciplines Outcome   SLP Goal     SLP Ongoing, Progressing   Description: 1. Pt will participate in ongoing swallow evaluation.                        Plan:     Patient to be seen:  5 x/week   Plan of Care expires:  11/11/22  Plan of Care reviewed with:  spouse   SLP Follow-Up:  Yes       Discharge recommendations:      Barriers to Discharge:  Level of Skilled Assistance Needed , and Safety Awareness .    Time Tracking:     SLP Treatment Date:   10/31/22  Speech Start Time:  1026  Speech Stop Time:  1047     Speech Total Time (min):  21 min    Billable Minutes: Eval Swallow and Oral Function 21 and Total Time 21    10/31/2022

## 2022-10-31 NOTE — PLAN OF CARE
Problem: SLP  Goal: SLP Goal  Description: 1. Pt will participate in ongoing swallow evaluation.   Outcome: Ongoing, Progressing     Orders received for clinical swallow evaluation. Pt not adequately alert for PO at this time. REC NPO, including medication. Oral suctioning as needed. Will continue to follow for ongoing swallow assessment.

## 2022-10-31 NOTE — SUBJECTIVE & OBJECTIVE
Interval History: see HPI    Past Medical History:   Diagnosis Date    Cancer     Dementia     Depression     Hypertension        Past Surgical History:   Procedure Laterality Date    Lymph node Ca removed         Review of patient's allergies indicates:  No Known Allergies    Medications:  Continuous Infusions:   dextrose 5 % and 0.45 % NaCl 75 mL/hr at 10/31/22 0600     Scheduled Meds:   amLODIPine  10 mg Oral Daily    aspirin  81 mg Oral Daily    atorvastatin  40 mg Oral Daily    azithromycin  500 mg Intravenous Q24H    cefTRIAXone (ROCEPHIN) IVPB  1 g Intravenous Q24H    enoxaparin  40 mg Subcutaneous Daily    FLUoxetine  40 mg Oral Daily    levothyroxine  88 mcg Oral Before breakfast    OXcarbazepine  300 mg Oral BID    scopolamine  1 patch Transdermal Q3 Days    senna-docusate 8.6-50 mg  1 tablet Oral BID     PRN Meds:acetaminophen, acetaminophen, aluminum-magnesium hydroxide-simethicone, bisacodyL, dextrose 10%, dextrose 10%, glucagon (human recombinant), glucose, glucose, labetaloL, naloxone, ondansetron, sodium chloride 0.9%    Family History    None       Tobacco Use    Smoking status: Not on file    Smokeless tobacco: Not on file   Substance and Sexual Activity    Alcohol use: Not on file    Drug use: Not on file    Sexual activity: Not on file       Review of Systems   Unable to perform ROS: Mental status change   Objective:     Vital Signs (Most Recent):  Temp: 97.1 °F (36.2 °C) (10/31/22 1238)  Pulse: 92 (10/31/22 1300)  Resp: 20 (10/31/22 1300)  BP: (!) 163/90 (10/31/22 1238)  SpO2: 95 % (10/31/22 1238)   Vital Signs (24h Range):  Temp:  [97.1 °F (36.2 °C)-101 °F (38.3 °C)] 97.1 °F (36.2 °C)  Pulse:  [] 92  Resp:  [18-20] 20  SpO2:  [95 %-98 %] 95 %  BP: (106-182)/(56-90) 163/90     Weight: 56.7 kg (125 lb)  Body mass index is 19.58 kg/m².    Physical Exam  Constitutional:       General: He is in acute distress.      Appearance: He is ill-appearing. He is not toxic-appearing.   HENT:      Head:  Normocephalic and atraumatic.      Right Ear: External ear normal.      Left Ear: External ear normal.      Mouth/Throat:      Mouth: Mucous membranes are dry.      Pharynx: Oropharynx is clear. No oropharyngeal exudate or posterior oropharyngeal erythema.   Eyes:      General:         Right eye: No discharge.         Left eye: No discharge.      Extraocular Movements: Extraocular movements intact.   Cardiovascular:      Rate and Rhythm: Normal rate and regular rhythm.   Pulmonary:      Effort: Pulmonary effort is normal. No respiratory distress.      Breath sounds: No wheezing.   Abdominal:      General: There is no distension.      Palpations: Abdomen is soft.   Musculoskeletal:         General: No swelling or tenderness.      Cervical back: Neck supple. No tenderness.   Skin:     General: Skin is warm and dry.   Neurological:      Mental Status: He is alert. He is disoriented.       Review of Symptoms      Symptom Assessment (ESAS 0-10 Scale)  Unable to complete assessment due to Mental status change         Pain Assessment in Advanced Demential Scale (PAINAD)   Breathing - Independent of vocalization:  0  Negative vocalization:  1  Facial expression:  1  Body language:  1  Consolability:  2  Total:  5    Performance Status:  20    Living Arrangements:  Lives with spouse     Time-Based Charting:  Yes  Chart Review: 25 minutes  Face to Face: 75 minutes    Total Time Spent: 100 minutes      Advance Care Planning   Advance Directives:   Do Not Resuscitate Status: Yes      Decision Making:  Family answered questions  Goals of Care: The family endorses that what is most important right now is to focus on improvement in condition but with limits to invasive therapies and comfort and QOL     Accordingly, we have decided that the best plan to meet the patient's goals includes continuing with treatment       Significant Labs: BMP:   Recent Labs   Lab 10/31/22  0440      *   K 3.2*   *   CO2 22*   BUN 19    CREATININE 0.8   CALCIUM 9.1   MG 2.0     CBC:   Recent Labs   Lab 10/30/22  0428 10/31/22  0440   WBC 9.24 7.73   HGB 11.0* 11.5*   HCT 33.4* 34.4*    221     CBC:   Recent Labs   Lab 10/31/22  0440   WBC 7.73   HGB 11.5*   HCT 34.4*   MCV 92        BMP:  Recent Labs   Lab 10/31/22  0440      *   K 3.2*   *   CO2 22*   BUN 19   CREATININE 0.8   CALCIUM 9.1   MG 2.0     LFT:  Lab Results   Component Value Date    AST 89 (H) 10/31/2022    ALKPHOS 85 10/31/2022    BILITOT 0.7 10/31/2022     Albumin:   Albumin   Date Value Ref Range Status   10/31/2022 2.8 (L) 3.5 - 5.2 g/dL Final     Protein:   Total Protein   Date Value Ref Range Status   10/31/2022 5.5 (L) 6.0 - 8.4 g/dL Final     Lactic acid:   Lab Results   Component Value Date    LACTATE 1.2 10/29/2022       Significant Imaging: I have reviewed all pertinent imaging results/findings within the past 24 hours.

## 2022-10-31 NOTE — ASSESSMENT & PLAN NOTE
ROSIE Joyner and I met with Mr. Caballero at bedside. He lacks capacity for complex medical decision making. We spoke with his wife outside of his room.    I introduce our team and our role as a Palliative care team; she was agreeable to speaking.    We discussed his background, medical issues, prognosis and values in great detail.    Briefly they have been  for 51 years. 3 children and 6 grandchildren; he particularly adores his youngest grandchild. They have a Christianity jasbir.    She was able to very accurately describe his medical course up to this point. 2 weeks ago he was his typical self, then he had an outburst and has not been the same since.     She and he are hopeful with more time and supportive measures that he may recover. He would be willing to pursue any reasonably available measure to potentially recover. She also understands his current condition may be a new baseline; in this circumstance she understands he would not live a long time. At this point she understands his prognosis is very uncertain.     As a family they have discussed code status and desire to maintain a DNR code status. I have placed this order. I discussed the risks and benefits; she is very clear on a DNR code status.    I did take a moment to discuss hospice and when that type of care might be appropriate. Hospice is not aligned with his goals today.    We spoke a bit back and forth. Ultimately I recommended that we continue to supportive measures and taking his care one day at a time. She agreed and agreed to me meeting with her again.    I updated Dr. Thompson on the above.    I hope I have been helpful. I will continue to follow.

## 2022-10-31 NOTE — CARE UPDATE
10/31/22 0808   Patient Assessment/Suction   Level of Consciousness (AVPU) responds to pain   Respiratory Effort Unlabored   PRE-TX-O2   O2 Device (Oxygen Therapy) room air   SpO2 96 %   Pulse Oximetry Type Intermittent   $ Pulse Oximetry - Multiple Charge Pulse Oximetry - Multiple

## 2022-10-31 NOTE — ASSESSMENT & PLAN NOTE
CXR  UA reflex to culture  Blood culture  Rocephin and azithromycin to cover potential CAP   Head CTA if able to be still - ? meningioencephalitis - would need LP and he is not cooperative at this time for that

## 2022-10-31 NOTE — PROGRESS NOTES
[facility] Medicine  Progress Note    Patient Name: Ronn Caballero Jr.  MRN: [id number]  Patient Class: IP- Inpatient   Admission Date: 10/29/2022  Length of Stay: 2 days  Attending Physician: Chelo Gibbs MD  Primary Care Provider: JELLY Decker        Subjective:     Principal Problem:Dementia with behavioral disturbance        HPI:  71 yo male with h/o thyroid cancer, frontal temporal lobe dementia, HTN, HLP, depression and anxiety transferred from [facility] due to 6 days of failure to thrive. It is reported from the facility that he was PEC/CEC (expires 11/3/22) due to combative behavior at home. History comes from the patient's spouse and sister-in -law at bedside. Patient follows a neurologist in [place] and spouse reports recent change in medications include increase of zyprexa from 2.5mg to 10mg BID. He was also started on gabapentin TID. Spouse denies recent fever, chills, cough, D/N/V.  Per the MAR from [place] he received Haldol x4 IM, benadryl and ativan IV x2. The facility reports he was not eating or drinking and no witnessed episodes of diarrhea or vomiting. On our workup he has Na153, elevated  AST, hematuria, WBC 17k with 2% bands, lactic acid 1.2. No source of infection on UA or chest xray. On exam he does show discomfort in RUQ and midepigastrium. Abd CT and lipase pending. Troponin mildly elevated 0.056 with some EKG changes.     Hospital medicine consulted for further medical management. Physical restraints will be used to protect patient as he is pulling at cardiac leads and peripheral IV. Neurology consulted. Needs tele sitter.       Overview/Hospital Course:  Patient admitted from [facility] facility with failure to thrive and progressive psychosis. He has h/o frontal temporal dementia. Overnight he was given IV ativan and has no improvement in behavior. Patient unable to participate in a telepsych visit.  Neurology was consulted on guidance to medications. Appears he was given a combination of meds in addition to his home medication and possibly polypharmacy resulted in psychosis. He does require restraints as he is a harm to himself. Will try to resume home medications if he can follow commands to swallow pills. Electrolyte replacement by IV. IVF for hypernatremia and elevated CP >3000. Monitoring on tele due to QT prolongation. The spouse and daughter at bedside and agree to palliative care discussion in regards to goals of care and advanced care planning.     Patient given zyprexa 5mg and 10mg IM - only works briefly. Otherwise he becomes very combative and agitated both verbally and physically. Restraints remain on. He cannot follow commands and swallow home medications; refer to neurology for further recommendations. Try to get head CT with contrast if he can remain calm ans still. Fevers overnight concern for infection. Blood culture negative; repeat Ua, CXR pending. Started Rocephin and azithromycin IV for potential respiratory infection. Pronounced upper airway resp secretions.       Interval History: CEC with sitter present, in restraints and highly agitated, not following commands, T 101 last night, not able to redirect patient to calm down - when calm he's hard asleep and difficult to arouse     Review of Systems   Unable to perform ROS: Psychiatric disorder   Objective:     Vital Signs (Most Recent):  Temp: 98.1 °F (36.7 °C) (10/31/22 0734)  Pulse: 89 (10/31/22 0734)  Resp: 18 (10/31/22 0734)  BP: (!) 182/90 (10/31/22 0734)  SpO2: 96 % (10/31/22 0808) Vital Signs (24h Range):  Temp:  [97.2 °F (36.2 °C)-101 °F (38.3 °C)] 98.1 °F (36.7 °C)  Pulse:  [67-89] 89  Resp:  [18-20] 18  SpO2:  [95 %-98 %] 96 %  BP: (106-182)/(56-90) 182/90     Weight: 56.7 kg (125 lb)  Body mass index is 19.58 kg/m².    Intake/Output Summary (Last 24 hours) at 10/31/2022 1229  Last data filed at 10/31/2022 0600  Gross per 24 hour    Intake 2719.61 ml   Output --   Net 2719.61 ml        Physical Exam  Constitutional:       General: He is in acute distress.      Comments: Restless in restraints    Cardiovascular:      Rate and Rhythm: Normal rate and regular rhythm.      Heart sounds: No murmur heard.  Pulmonary:      Effort: Pulmonary effort is normal.      Breath sounds: Normal breath sounds. No wheezing.   Abdominal:      General: There is no distension.      Palpations: There is no mass.   Musculoskeletal:         General: Normal range of motion.   Skin:     General: Skin is warm and dry.   Neurological:      Mental Status: He is disoriented.      Comments: Not able to follow commands due to agitation    Psychiatric:      Comments: Yelling, screaming, cussing        Significant Labs: All pertinent labs within the past 24 hours have been reviewed.  Blood Culture:   Recent Labs   Lab 10/29/22  1542 10/29/22  1543   LABBLOO No Growth to date  No Growth to date No Growth to date  No Growth to date       CBC:   Recent Labs   Lab 10/30/22  0428 10/31/22  0440   WBC 9.24 7.73   HGB 11.0* 11.5*   HCT 33.4* 34.4*    221       CMP:   Recent Labs   Lab 10/30/22  0428 10/31/22  0440   * 149*   K 3.3* 3.2*   * 120*   CO2 22* 22*   * 105   BUN 30* 19   CREATININE 0.9 0.8   CALCIUM 9.0 9.1   PROT 5.6* 5.5*   ALBUMIN 2.9* 2.8*   BILITOT 0.7 0.7   ALKPHOS 83 85   * 89*   ALT 44 48*   ANIONGAP 8 7*       Cardiac Markers: No results for input(s): CKMB, MYOGLOBIN, BNP, TROPISTAT in the last 48 hours.  Lactic Acid:   Recent Labs   Lab 10/29/22  1423   LACTATE 1.2       Lipase:   Recent Labs   Lab 10/29/22  1543   LIPASE 7       Lipid Panel: No results for input(s): CHOL, HDL, LDLCALC, TRIG, CHOLHDL in the last 48 hours.  Magnesium:   Recent Labs   Lab 10/30/22  0428 10/31/22  0440   MG 2.1 2.0       POCT Glucose: No results for input(s): POCTGLUCOSE in the last 48 hours.  Troponin:   Recent Labs   Lab 10/29/22  1232  10/29/22  1656 10/29/22  2112   TROPONINI 0.056* 0.067* 0.078*       TSH:   Recent Labs   Lab 10/23/22  1415   TSH 0.976       Urine Studies:   No results for input(s): COLORU, APPEARANCEUA, PHUR, SPECGRAV, PROTEINUA, GLUCUA, KETONESU, BILIRUBINUA, OCCULTUA, NITRITE, UROBILINOGEN, LEUKOCYTESUR, RBCUA, WBCUA, BACTERIA, SQUAMEPITHEL, HYALINECASTS in the last 48 hours.    Invalid input(s): WRIGHTCROWR      Significant Imaging: I have reviewed all pertinent imaging results/findings within the past 24 hours.      Assessment/Plan:      * Dementia with behavioral disturbance  Sharp change in status. Possibly infectious or metabolic   CT of abdomen and pelvis - left kidney mass vs complex cyst, non obstructing stone   IVF   Neurology consulted - patient unable to do tele psych visit at this time   [facility]   Neurology recs: - resume home meds- if able to follow commands and swallow properly   Restraints if danger to himself  Tele monitoring     Head CTA if able to be still       Fever  CXR  UA reflex to culture  Blood culture  Rocephin and azithromycin to cover potential CAP   Head CTA if able to be still - ? meningioencephalitis - would need LP and he is not cooperative at this time for that       Bandemia  Leukocytosis - unknown etiology  LA 1.2 and no fever  UA - no signs of infections  CXR - clear   CT of abdomen  Trend CBC - WBC normal range and no bandemia on repeat - with IVF      Frontotemporal dementia  Follows neurologist in [place] with recent increases in meds  Trileptal level pending  Neuro recs: resume home meds - not able to safely take them - further recs?      Postoperative hypothyroidism  Resume synthroid   FT4 - normal       Hypernatremia    d5 .45 NS IVF  BMp in am - Na slowly trending down         Failure to thrive in adult  IVF until mental status is appropriate for oral diet  Progression of dementia  Palliative care discussion     If neuro status continues, will need to place  NGT for meds and nutrition       essential hypertension  If passes swallow eval, resume home norvasc  IV labetalol PRN         VTE Risk Mitigation (From admission, onward)         Ordered     enoxaparin injection 40 mg  Daily         10/29/22 1513     IP VTE HIGH RISK PATIENT  Once         10/29/22 1513     Place sequential compression device  Until discontinued         10/29/22 1513                Discharge Planning   TAYLOR:      Code Status: Full Code   Is the patient medically ready for discharge?:     Reason for patient still in hospital (select all that apply): Patient new problem  Discharge Plan A: Psychiatric hospital                  Chelo Gibbs MD  Department of Hospital Medicine   [facility]

## 2022-10-31 NOTE — HPI
"Per admission H&P:    "73 yo male with h/o thyroid cancer, frontal temporal lobe dementia, HTN, HLP, depression and anxiety transferred from [facility] due to 6 days of failure to thrive. It is reported from the facility that he was PEC/CEC (expires 11/3/22) due to combative behavior at home. History comes from the patient's spouse and sister-in -law at bedside. Patient follows a neurologist in [place] and spouse reports recent change in medications include increase of zyprexa from 2.5mg to 10mg BID. He was also started on gabapentin TID. Spouse denies recent fever, chills, cough, D/N/V.  Per the MAR from [place] he received Haldol x4 IM, benadryl and ativan IV x2. The facility reports he was not eating or drinking and no witnessed episodes of diarrhea or vomiting. On our workup he has Na153, elevated  AST, hematuria, WBC 17k with 2% bands, lactic acid 1.2. No source of infection on UA or chest xray. On exam he does show discomfort in RUQ and midepigastrium. Abd CT and lipase pending. Troponin mildly elevated 0.056 with some EKG changes. "     Since admission his work up has been largely unrevealing. He remains altered and combative when he is not sleeping. Neurology planning for LP. At this point his prognosis is guarded and potentially quite poor if his mentation does not improve. I have been asked to assist with goals of care.  "

## 2022-10-31 NOTE — PLAN OF CARE
POC discussed with patient, unable to assess understanding. Pt remain disoriented. Has rested well for most of shift. Required IM zyprexa once this shift. Pt with rectal temp of 101.0 and received tylenol suppository. Remains in monae soft wrist restraints without injury. Side rails padded for injury prevention. IV to right arm infiltrated and was removed. Iv replaced to left arm and iv fluids infusing without difficulty. Safety measures maintained with side rails up and PEC sitter at door. Brief on for incontinence and patient changed when needed.

## 2022-10-31 NOTE — ASSESSMENT & PLAN NOTE
Sharp change in status. Possibly infectious or metabolic   CT of abdomen and pelvis - left kidney mass vs complex cyst, non obstructing stone   IVF   Neurology consulted - patient unable to do tele psych visit at this time   [facility]   Neurology recs: - resume home meds- if able to follow commands and swallow properly   Restraints if danger to himself  Tele monitoring     Head CTA if able to be still

## 2022-10-31 NOTE — ASSESSMENT & PLAN NOTE
Follows neurologist in [place] with recent increases in meds  Trileptal level pending  Neuro recs: resume home meds - not able to safely take them - further recs?

## 2022-10-31 NOTE — SUBJECTIVE & OBJECTIVE
Interval History: CEC with sitter present, in restraints and highly agitated, not following commands, T 101 last night, not able to redirect patient to calm down - when calm he's hard asleep and difficult to arouse     Review of Systems   Unable to perform ROS: Psychiatric disorder   Objective:     Vital Signs (Most Recent):  Temp: 98.1 °F (36.7 °C) (10/31/22 0734)  Pulse: 89 (10/31/22 0734)  Resp: 18 (10/31/22 0734)  BP: (!) 182/90 (10/31/22 0734)  SpO2: 96 % (10/31/22 0808) Vital Signs (24h Range):  Temp:  [97.2 °F (36.2 °C)-101 °F (38.3 °C)] 98.1 °F (36.7 °C)  Pulse:  [67-89] 89  Resp:  [18-20] 18  SpO2:  [95 %-98 %] 96 %  BP: (106-182)/(56-90) 182/90     Weight: 56.7 kg (125 lb)  Body mass index is 19.58 kg/m².    Intake/Output Summary (Last 24 hours) at 10/31/2022 1229  Last data filed at 10/31/2022 0600  Gross per 24 hour   Intake 2719.61 ml   Output --   Net 2719.61 ml        Physical Exam  Constitutional:       General: He is in acute distress.      Comments: Restless in restraints    Cardiovascular:      Rate and Rhythm: Normal rate and regular rhythm.      Heart sounds: No murmur heard.  Pulmonary:      Effort: Pulmonary effort is normal.      Breath sounds: Normal breath sounds. No wheezing.   Abdominal:      General: There is no distension.      Palpations: There is no mass.   Musculoskeletal:         General: Normal range of motion.   Skin:     General: Skin is warm and dry.   Neurological:      Mental Status: He is disoriented.      Comments: Not able to follow commands due to agitation    Psychiatric:      Comments: Yelling, screaming, cussing        Significant Labs: All pertinent labs within the past 24 hours have been reviewed.  Blood Culture:   Recent Labs   Lab 10/29/22  1542 10/29/22  1543   LABBLOO No Growth to date  No Growth to date No Growth to date  No Growth to date       CBC:   Recent Labs   Lab 10/30/22  0428 10/31/22  0440   WBC 9.24 7.73   HGB 11.0* 11.5*   HCT 33.4* 34.4*     221       CMP:   Recent Labs   Lab 10/30/22  0428 10/31/22  0440   * 149*   K 3.3* 3.2*   * 120*   CO2 22* 22*   * 105   BUN 30* 19   CREATININE 0.9 0.8   CALCIUM 9.0 9.1   PROT 5.6* 5.5*   ALBUMIN 2.9* 2.8*   BILITOT 0.7 0.7   ALKPHOS 83 85   * 89*   ALT 44 48*   ANIONGAP 8 7*       Cardiac Markers: No results for input(s): CKMB, MYOGLOBIN, BNP, TROPISTAT in the last 48 hours.  Lactic Acid:   Recent Labs   Lab 10/29/22  1423   LACTATE 1.2       Lipase:   Recent Labs   Lab 10/29/22  1543   LIPASE 7       Lipid Panel: No results for input(s): CHOL, HDL, LDLCALC, TRIG, CHOLHDL in the last 48 hours.  Magnesium:   Recent Labs   Lab 10/30/22  0428 10/31/22  0440   MG 2.1 2.0       POCT Glucose: No results for input(s): POCTGLUCOSE in the last 48 hours.  Troponin:   Recent Labs   Lab 10/29/22  1232 10/29/22  1656 10/29/22  2112   TROPONINI 0.056* 0.067* 0.078*       TSH:   Recent Labs   Lab 10/23/22  1415   TSH 0.976       Urine Studies:   No results for input(s): COLORU, APPEARANCEUA, PHUR, SPECGRAV, PROTEINUA, GLUCUA, KETONESU, BILIRUBINUA, OCCULTUA, NITRITE, UROBILINOGEN, LEUKOCYTESUR, RBCUA, WBCUA, BACTERIA, SQUAMEPITHEL, HYALINECASTS in the last 48 hours.    Invalid input(s): MIGUEL      Significant Imaging: I have reviewed all pertinent imaging results/findings within the past 24 hours.

## 2022-10-31 NOTE — CONSULTS
Consult Note  Infectious Disease    Reason for Consult:  AMS + Fever rule out meningitis     HPI: Ronn Caballero Jr. is a 72 y.o. male with past medical history of thyroid cancer, frontal temporal dementia, hypertension, hyperlipidemia, depression/anxiety who was transferred from [facility] due to 6 days of failure to thrive.  History obtain from wife at bedside.  She states patient started acting aggressive 2 weeks ago, she had to call the ambulance and patient was placed at [place].  Of note patient was taking Zyprexa 2.5 mg daily and dose was increased 10 mg twice a day.  He was also started on gabapentin 3 times a day.  Wife states she was not able to communicate with the facility for about a week until she called her daughter for assistance and was advised to bring the patient to the emergency room.  As per records, patient received Haldol x4, Benadryl, and Ativan x2.  Reports of not eating, not drinking noted.  No personal history of seizures.  She states patient has shuffled gait at baseline, last time she saw him at home he did not have cough, shortness of breath, nausea or vomiting, or any complaint before being transferred to psychiatric facility.    In the ER, patient hypertensive, afebrile   Labs on admission, leukocytosis 17.4, left shift 85%, bands 2%, H&H 13.7/41.9, platelet count 274   Hypernatremia 153  Creatinine 1.2   /ALT 42   CPK 3221   Lactic acid 1.2   Procalcitonin 0.3  UA with no pyuria, occasional bacteria, negative for UTI   Blood cultures x2 no growth to date, pending final   Chest x-ray clear   CT head with calcified mass in the floor of the right middle cranial fossa, most suggestive of petrous apex meningioma.  Incidental finding.  Essential negative study, aside from atrophy.    CT abdomen/pelvis limited by motion, cyst on left kidney.  Constipation.  Nonobstructive renal stone.    Hospital course complicated by fever of  101 10/30 and 102.5 today.   ID consult to rule out meningitis. Empirically started on Vancomycin and Ceftriaxone IV.     Review of patient's allergies indicates:  No Known Allergies  Past Medical History:   Diagnosis Date    Cancer     Dementia     Depression     Hypertension      Past Surgical History:   Procedure Laterality Date    Lymph node Ca removed       Social History     Tobacco Use    Smoking status: Not on file    Smokeless tobacco: Not on file   Substance Use Topics    Alcohol use: Not on file        No family history on file.    Pertinent medications noted: Zyprexa/haldol/Ativan    Review of Systems: Patient altered, unable to follow commands    Outdoor activities: From home, recently admitted to [facility] for abnormal behavior in the setting of frontotemporal dementia.  Travel: None  Implants: None  Antibiotic History: Vanco/Ceftriaxone    EXAM & DIAGNOSTICS REVIEWED:   Vitals:     Temp:  [97.1 °F (36.2 °C)-102.3 °F (39.1 °C)]   Temp: (!) 102.3 °F (39.1 °C) (10/31/22 1608)  Pulse: 99 (10/31/22 1608)  Resp: 18 (10/31/22 1608)  BP: 111/64 (10/31/22 1608)  SpO2: (!) 94 % (10/31/22 1608)    Intake/Output Summary (Last 24 hours) at 10/31/2022 1643  Last data filed at 10/31/2022 1558  Gross per 24 hour   Intake 2719.61 ml   Output 250 ml   Net 2469.61 ml       General:  Ill-appearing, flushed on face and neck, gargling, altered  Eyes:  Eyes shut, unable to open  ENT:  Dry thick oral secretions on tongue and soft palate, no thrush  Neck:  Rigid  Lungs: Coarse breath sounds b/l  Heart:  Tachycardic, S1/S2+, regular rhythm, no murmurs  Abd:  +BS, soft, non tender, non distended, no rebound  :  Voids,  Musc:  Upper extremities contracted, joints without effusion, swelling,  erythema, synovitis  Skin:  Warm, R arm with redness on AC fossa, indurated area noted, sees like prior IV infiltrated, scab noted, see pic below   Wound:   Neuro:  Altered, not following commands, upper trunk rigidity   Psych:    Lymphatic:     No cervical,  supraclavicular nodes  Extrem: No LE edema b/l  VAD:  Peripheral IV       Isolation: None    10/31:        General Labs reviewed:  Recent Labs   Lab 10/29/22  1121 10/30/22  0428 10/31/22  0440   WBC 17.42* 9.24 7.73   HGB 13.7* 11.0* 11.5*   HCT 41.9 33.4* 34.4*    217 221       Recent Labs   Lab 10/29/22  1121 10/30/22  0428 10/31/22  0440   * 150* 149*   K 4.3 3.3* 3.2*   * 120* 120*   CO2 24 22* 22*   BUN 42* 30* 19   CREATININE 1.2 0.9 0.8   CALCIUM 10.7* 9.0 9.1   PROT 7.2 5.6* 5.5*   BILITOT 0.9 0.7 0.7   ALKPHOS 102 83 85   ALT 42 44 48*   * 108* 89*     No results for input(s): CRP in the last 168 hours.  No results for input(s): SEDRATE in the last 168 hours.    Estimated Creatinine Clearance: 68.9 mL/min (based on SCr of 0.8 mg/dL).     Micro:  Microbiology Results (last 7 days)       Procedure Component Value Units Date/Time    Blood culture (site 2) [[id number]] Collected: 10/29/22 1543    Order Status: Completed Specimen: Blood Updated: 10/31/22 0613     Blood Culture, Routine No Growth to date      No Growth to date    Narrative:      Site # 2, aerobic only    Blood culture (site 1) [[id number]] Collected: 10/29/22 1542    Order Status: Completed Specimen: Blood Updated: 10/31/22 0613     Blood Culture, Routine No Growth to date      No Growth to date    Narrative:      Site # 1, aerobic and anaerobic            Imaging Reviewed:  CXR  CT head   CT head/neck  CT abdomen/pelvis     Cardiology: EKG Qtc 496ms       IMPRESSION & PLAN     AMS + Fever, rule out meningitis vs NMS vs Serotoninergic syndrome in the setting of recent increased dose of Zyprexa and other neuroleptics, favoring the latter  Blood cultures x 2 no growth     2. Rhabdomyolysis   3. Transaminitis   4. PMHx: thyroid cancer, frontal temporal dementia, hypertension, hyperlipidemia, depression/anxiety     Recommendations:  LP when feasible to rule out meningitis; please send fluid for cell count,Gram stain and  cultures  Follow cultures   Continue Vancomycin IV, keep level 15-20  Ceftriaxone 2g IV q12h  Ampicillin 2g IV q6h for Listeria  Acyclovir 330mg IV q8h  Gentle IV fluids  Consider MRI brain   Neurology recommendations appreciated   Aspiration precautions  Oral hygiene  Incentive spirometry    Will follow     D/w wife at length, nursing, Dr Calle    Medical Decision Making during this encounter was  [_] Low Complexity  [_] Moderate Complexity  [xx] High Complexity

## 2022-10-31 NOTE — PROGRESS NOTES
[facility]  Progress Note  Date: 10/31/2022 9:36 AM            Patient Name: Ronn Caballero Jr.   MRN: [id number]   : 1950    AGE: 72 y.o.    LOS: 2 days Hospital Day: 3  Admit date: 10/29/2022 10:53 AM         HPI per EMR: Ronn Caballero Jr. is a 71 yo male with h/o thyroid cancer, frontal temporal lobe dementia, HTN, HLP, depression and anxiety transferred from [facility] due to 6 days of failure to thrive. It is reported from the facility that he was PEC/CEC (expires 11/3/22) due to combative behavior at home. History comes from the patient's spouse and sister-in -law at bedside. Patient follows a neurologist in [place] and spouse reports recent change in medications include increase of zyprexa from 2.5mg to 10mg BID. He was also started on gabapentin TID. Spouse denies recent fever, chills, cough, D/N/V.  Per the MAR from [place] he received Haldol x4 IM, benadryl and ativan IV x2. The facility reports he was not eating or drinking and no witnessed episodes of diarrhea or vomiting. On our workup he has Na153, elevated  AST, hematuria, WBC 17k with 2% bands, lactic acid 1.2. No source of infection on UA or chest xray. On exam he does show discomfort in RUQ and midepigastrium. Abd CT and lipase pending. Troponin mildly elevated 0.056 with some EKG changes.      Hospital medicine consulted for further medical management. Physical restraints will be used to protect patient as he is pulling at cardiac leads and peripheral IV. Neurology consulted. Needs tele sitter.     10/31/2022:  Patient was seen and examined by me this morning.  He is encephalopathic and only able to tell me his name on repeated questioning.  Patient's wife was at bedside and states that he has a history of frontotemporal dementia however last  he suddenly became altered and combative for which he was taken to the hospital and then to [facility].    His CK  has been elevated and he has been encephalopathic and febrile.          Vitals:  Patient Vitals for the past 24 hrs:   BP Temp Temp src Pulse Resp SpO2   10/31/22 0808 -- -- -- -- -- 96 %   10/31/22 0734 (!) 182/90 98.1 °F (36.7 °C) Oral 89 18 95 %   10/31/22 0354 (!) 106/56 97.7 °F (36.5 °C) -- 67 18 95 %   10/31/22 0126 -- (!) 101 °F (38.3 °C) -- -- -- --   10/31/22 0036 (!) 161/74 (!) 100.6 °F (38.1 °C) Axillary 75 20 97 %   10/30/22 2021 -- -- -- 72 18 96 %   10/30/22 2009 (!) 158/79 97.2 °F (36.2 °C) -- 71 18 98 %   10/30/22 1612 131/79 (!) 100.8 °F (38.2 °C) Oral 84 18 95 %   10/30/22 1143 119/74 98.3 °F (36.8 °C) Oral 74 18 97 %     PHYSICAL EXAM:     GENERAL APPEARANCE:  Patient is encephalopathic, only response to repeated stimulus and only able to tell me his name.  He does not follow commands  HEENT: Normocephalic and atraumatic. PERRL. Oropharynx unremarkable.  PULM: Comfortable on room air.  CV: RRR.  ABDOMEN: Soft, nontender.  EXTREMITIES: No signs of vascular compromise. Pulses present. No cyanosis, clubbing or edema.  SKIN: Clear; no rashes, lesions or skin breaks in exposed areas.      NEURO:   MENTAL STATUS: Patient is encephalopathic, only response to repeated stimulus and only able to tell me his name.  He does not follow commands  CRANIAL NERVES II-XII:  Face is grossly symmetric.  Unable to assess any other cranial nerves due to his mental status  MOTOR: Normal bulk.  Unable to assess tone and strength in extremities as he resists.  Moves all extremities to stimulus symmetrically.  REFLEXES: DTRs 2+; normal and symmetric throughout.   SENSATION: Sensation cannot be tested  COORDINATION:  Cannot be tested  STATION: [place] deferred.  GAIT: Deferred.    CURRENT SCHEDULED MEDICATIONS:   amLODIPine  10 mg Oral Daily    aspirin  81 mg Oral Daily    atorvastatin  40 mg Oral Daily    enoxaparin  40 mg Subcutaneous Daily    FLUoxetine  40 mg Oral Daily    levothyroxine  88 mcg Oral Before breakfast     OXcarbazepine  300 mg Oral BID    potassium chloride  10 mEq Intravenous Q1H    senna-docusate 8.6-50 mg  1 tablet Oral BID     CURRENT INFUSIONS:   dextrose 5 % and 0.45 % NaCl 75 mL/hr at 10/31/22 0600     DATA:  Recent Labs   Lab 10/29/22  1121 10/30/22  0428 10/31/22  0440   * 150* 149*   K 4.3 3.3* 3.2*   * 120* 120*   CO2 24 22* 22*   BUN 42* 30* 19   CREATININE 1.2 0.9 0.8    112* 105   CALCIUM 10.7* 9.0 9.1   PHOS  --  2.0* 2.5*   MG  --  2.1 2.0   * 108* 89*   ALT 42 44 48*     Recent Labs   Lab 10/29/22  1121 10/30/22  0428 10/31/22  0440   WBC 17.42* 9.24 7.73   HGB 13.7* 11.0* 11.5*   HCT 41.9 33.4* 34.4*    217 221     No results found for: PROTEINCSF, GLUCCSF, WBCCSF, RBCCSF  Hemoglobin A1c   Date Value Ref Range Status   05/09/2018 5.6 4.2 - 6.3 % Final            I have personally reviewed and interpreted the pertinent imaging and lab results.  Imaging Results               CT Abdomen Pelvis  Without Contrast (Final result)  Result time 10/29/22 16:26:14      Final result by Luana Horner MD (10/29/22 16:26:14)                   Impression:      Study limited by motion, demonstrating a complicated cyst or solid mass extending from the lower pole of the left kidney.  This should be worked up further with a study with IV contrast when the patient is able to remain still.    Nonobstructing right renal calculus.    Constipation.    This report was flagged in Epic as abnormal.      Electronically signed by: Luana Horner  Date:    10/29/2022  Time:    16:26               Narrative:    EXAMINATION:  CT ABDOMEN PELVIS WITHOUT CONTRAST    CLINICAL HISTORY:  Abdominal abscess/infection suspected; failure to thrive    TECHNIQUE:  Low dose axial images, sagittal and coronal reformations were obtained from the lung bases to the pubic symphysis.  Neither oral nor IV contrast was administered.  The patient had a difficult time remaining still for image  acquisition    COMPARISON:  None    FINDINGS:  Consolidation in the dependent portions of the lung bases is likely due to atelectasis.    Limited noncontrast images of the liver, gallbladder, spleen, adrenal glands, pancreas are grossly normal.  There is a nonobstructing 5 mm right lower pole renal calculus.  There is a 3.2 x 3.5 cm structure with a density of 46 Hounsfield units extending from the lower pole of the left kidney series 4, image 99.  This may be a complex cyst or solid mass.  No adenopathy is noted.    The aorta is normal in caliber.    There is generalized fecal stasis throughout the colon.  No evidence of a bowel obstruction.  Urinary bladder unremarkable.  There is no free intraperitoneal fluid or air.  No acute findings in the bones.                                       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  This is likely an incidental finding.  It produces no mass effect/edema on the adjacent brain.  Otherwise, essentially negative study, aside from atrophy.      Electronically signed by: Luana Horner  Date:    10/29/2022  Time:    12:01               Narrative:    EXAMINATION:  CT HEAD WITHOUT CONTRAST    CLINICAL HISTORY:  Mental status change, unknown cause;    TECHNIQUE:  Low dose axial images were obtained through the head.  Coronal and sagittal reformations were also performed. Contrast was not administered.    COMPARISON:  None    FINDINGS:  The study is mildly limited by motion.  There is no intra or extra-axial hemorrhage.  No mass effect, edema or midline shift is present.  Mild generalized atrophy, particularly central atrophy is noted.  There is no skull fracture.  The visualized paranasal sinuses are clear.    There is 1.6 cm calcified  mass in the floor of the right anterior cranial fossa, extending from the petrous temporal bone.  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  There is no edema in the adjacent temporal lobe.                                       X-Ray Chest AP Portable (Final result)  Result time 10/29/22 11:35:49      Final result by Luana Horner MD (10/29/22 11:35:49)                   Impression:      No acute cardiopulmonary disease.      Electronically signed by: Luana Horner  Date:    10/29/2022  Time:    11:35               Narrative:    EXAMINATION:  XR CHEST AP PORTABLE    CLINICAL HISTORY:  Altered mental status, unspecified    TECHNIQUE:  Single frontal view of the chest was performed.    COMPARISON:  05/28/2018    FINDINGS:  The patient is mildly rotated to the right.  The heart size is normal.  The aorta is ectatic.  The lungs are clear.  There is no pleural effusion or acute bony abnormality.                                              ASSESSMENT AND PLAN:    Encephalopathy    Etiology of encephalopathy could be multifactorial.  Patient has been febrile and there is concern for infectious encephalopathy versus worsening dementia versus polypharmacy vs intracranial infection. Cannot rule out NMS vs Serotonin syndrome as CK has been elevated(tone was not able to be assessed).  Continue IV fluids and scheduled ativan  Patient is on Trileptal at home which is likely for mood.  Patient does not have any history of seizures as per his wife.  If cannot take p.o. medication, recommend switching it to valproic acid 250 mg t.i.d. for mood  Recommend psychiatry consult  I discussed with primary team regarding the need for lumbar puncture to rule out an intracranial infection.  Was told patient is not able to cooperate for lumbar puncture.  At this time patient would benefit from brought antibiotic treatment given lumbar puncture cannot be performed.  Recommend Infectious Disease consult as well.  Hold Zyprexa  and Fluoxetine  DVT prophylaxis  Will follow          42 minutes of care time has been spent evaluating with the patient. Time includes chart review not limited to diagnostic imaging, labs, and vitals, patient assessment, discussion with family and nursing, current order evaluations, and new order entries.      Lobito Calle MD  Neurology/vascular Neurology  Date of Service: 10/31/2022  9:36 AM    Please note: This note was transcribed using voice recognition software. Because of this technology there are often uinintended grammatical, spelling, and other transcription errors. Please disregard these errors.

## 2022-11-01 PROBLEM — E44.0 MODERATE MALNUTRITION: Status: ACTIVE | Noted: 2022-11-01

## 2022-11-01 LAB
ALBUMIN SERPL BCP-MCNC: 2.6 G/DL (ref 3.5–5.2)
ALP SERPL-CCNC: 79 U/L (ref 55–135)
ALT SERPL W/O P-5'-P-CCNC: 42 U/L (ref 10–44)
ANION GAP SERPL CALC-SCNC: 10 MMOL/L (ref 8–16)
AST SERPL-CCNC: 56 U/L (ref 10–40)
BASOPHILS # BLD AUTO: 0.06 K/UL (ref 0–0.2)
BASOPHILS NFR BLD: 0.6 % (ref 0–1.9)
BILIRUB SERPL-MCNC: 0.7 MG/DL (ref 0.1–1)
BUN SERPL-MCNC: 14 MG/DL (ref 8–23)
CALCIUM SERPL-MCNC: 8.7 MG/DL (ref 8.7–10.5)
CHLORIDE SERPL-SCNC: 112 MMOL/L (ref 95–110)
CLARITY CSF: CLEAR
CO2 SERPL-SCNC: 22 MMOL/L (ref 23–29)
COLOR CSF: COLORLESS
CREAT SERPL-MCNC: 0.9 MG/DL (ref 0.5–1.4)
DIFFERENTIAL METHOD: ABNORMAL
EOSINOPHIL # BLD AUTO: 0.2 K/UL (ref 0–0.5)
EOSINOPHIL NFR BLD: 1.8 % (ref 0–8)
ERYTHROCYTE [DISTWIDTH] IN BLOOD BY AUTOMATED COUNT: 12.6 % (ref 11.5–14.5)
EST. GFR  (NO RACE VARIABLE): >60 ML/MIN/1.73 M^2
GLUCOSE CSF-MCNC: 62 MG/DL (ref 40–70)
GLUCOSE SERPL-MCNC: 121 MG/DL (ref 70–110)
HCT VFR BLD AUTO: 33.7 % (ref 40–54)
HGB BLD-MCNC: 11.3 G/DL (ref 14–18)
IMM GRANULOCYTES # BLD AUTO: 0.03 K/UL (ref 0–0.04)
IMM GRANULOCYTES NFR BLD AUTO: 0.3 % (ref 0–0.5)
INR PPP: 1.4 (ref 0.8–1.2)
LYMPHOCYTES # BLD AUTO: 0.8 K/UL (ref 1–4.8)
LYMPHOCYTES NFR BLD: 7.9 % (ref 18–48)
MAGNESIUM SERPL-MCNC: 1.8 MG/DL (ref 1.6–2.6)
MCH RBC QN AUTO: 30.9 PG (ref 27–31)
MCHC RBC AUTO-ENTMCNC: 33.5 G/DL (ref 32–36)
MCV RBC AUTO: 92 FL (ref 82–98)
MONOCYTES # BLD AUTO: 1.4 K/UL (ref 0.3–1)
MONOCYTES NFR BLD: 14 % (ref 4–15)
NEUTROPHILS # BLD AUTO: 7.5 K/UL (ref 1.8–7.7)
NEUTROPHILS NFR BLD: 75.4 % (ref 38–73)
NRBC BLD-RTO: 0 /100 WBC
PHOSPHATE SERPL-MCNC: 1.7 MG/DL (ref 2.7–4.5)
PLATELET # BLD AUTO: 224 K/UL (ref 150–450)
PMV BLD AUTO: 11.1 FL (ref 9.2–12.9)
POTASSIUM SERPL-SCNC: 2.9 MMOL/L (ref 3.5–5.1)
PROT CSF-MCNC: 32 MG/DL (ref 15–40)
PROT SERPL-MCNC: 5.6 G/DL (ref 6–8.4)
PROTHROMBIN TIME: 14.1 SEC (ref 9–12.5)
RBC # BLD AUTO: 3.66 M/UL (ref 4.6–6.2)
RBC # CSF: 11.5 /CU MM
SARS-COV-2 RNA RESP QL NAA+PROBE: NOT DETECTED
SARS-COV-2- CYCLE NUMBER: NORMAL
SODIUM SERPL-SCNC: 144 MMOL/L (ref 136–145)
SPECIMEN VOL CSF: 11 ML
VANCOMYCIN TROUGH SERPL-MCNC: 8.2 UG/ML (ref 10–22)
WBC # BLD AUTO: 9.89 K/UL (ref 3.9–12.7)
WBC # CSF: 4 /CU MM (ref 0–5)

## 2022-11-01 PROCEDURE — 87186 SC STD MICRODIL/AGAR DIL: CPT | Performed by: STUDENT IN AN ORGANIZED HEALTH CARE EDUCATION/TRAINING PROGRAM

## 2022-11-01 PROCEDURE — 99233 PR SUBSEQUENT HOSPITAL CARE,LEVL III: ICD-10-PCS | Mod: S$GLB,,, | Performed by: STUDENT IN AN ORGANIZED HEALTH CARE EDUCATION/TRAINING PROGRAM

## 2022-11-01 PROCEDURE — 99900026 HC AIRWAY MAINTENANCE (STAT)

## 2022-11-01 PROCEDURE — 87070 CULTURE OTHR SPECIMN AEROBIC: CPT | Mod: 59 | Performed by: STUDENT IN AN ORGANIZED HEALTH CARE EDUCATION/TRAINING PROGRAM

## 2022-11-01 PROCEDURE — 63600175 PHARM REV CODE 636 W HCPCS: Performed by: INTERNAL MEDICINE

## 2022-11-01 PROCEDURE — 82945 GLUCOSE OTHER FLUID: CPT | Performed by: STUDENT IN AN ORGANIZED HEALTH CARE EDUCATION/TRAINING PROGRAM

## 2022-11-01 PROCEDURE — 87077 CULTURE AEROBIC IDENTIFY: CPT | Performed by: STUDENT IN AN ORGANIZED HEALTH CARE EDUCATION/TRAINING PROGRAM

## 2022-11-01 PROCEDURE — 36415 COLL VENOUS BLD VENIPUNCTURE: CPT | Performed by: HOSPITALIST

## 2022-11-01 PROCEDURE — 12000002 HC ACUTE/MED SURGE SEMI-PRIVATE ROOM

## 2022-11-01 PROCEDURE — 31720 CLEARANCE OF AIRWAYS: CPT

## 2022-11-01 PROCEDURE — 87483 CNS DNA AMP PROBE TYPE 12-25: CPT | Performed by: STUDENT IN AN ORGANIZED HEALTH CARE EDUCATION/TRAINING PROGRAM

## 2022-11-01 PROCEDURE — 80053 COMPREHEN METABOLIC PANEL: CPT | Performed by: HOSPITALIST

## 2022-11-01 PROCEDURE — 86592 SYPHILIS TEST NON-TREP QUAL: CPT | Performed by: STUDENT IN AN ORGANIZED HEALTH CARE EDUCATION/TRAINING PROGRAM

## 2022-11-01 PROCEDURE — 99233 SBSQ HOSP IP/OBS HIGH 50: CPT | Mod: S$GLB,,, | Performed by: STUDENT IN AN ORGANIZED HEALTH CARE EDUCATION/TRAINING PROGRAM

## 2022-11-01 PROCEDURE — A4217 STERILE WATER/SALINE, 500 ML: HCPCS | Performed by: INTERNAL MEDICINE

## 2022-11-01 PROCEDURE — 25000003 PHARM REV CODE 250: Performed by: INTERNAL MEDICINE

## 2022-11-01 PROCEDURE — 80202 ASSAY OF VANCOMYCIN: CPT | Performed by: HOSPITALIST

## 2022-11-01 PROCEDURE — 25000003 PHARM REV CODE 250: Performed by: RADIOLOGY

## 2022-11-01 PROCEDURE — 87070 CULTURE OTHR SPECIMN AEROBIC: CPT | Performed by: STUDENT IN AN ORGANIZED HEALTH CARE EDUCATION/TRAINING PROGRAM

## 2022-11-01 PROCEDURE — A9585 GADOBUTROL INJECTION: HCPCS | Performed by: HOSPITALIST

## 2022-11-01 PROCEDURE — 25000003 PHARM REV CODE 250: Performed by: HOSPITALIST

## 2022-11-01 PROCEDURE — 63600175 PHARM REV CODE 636 W HCPCS: Performed by: HOSPITALIST

## 2022-11-01 PROCEDURE — 25000003 PHARM REV CODE 250: Performed by: STUDENT IN AN ORGANIZED HEALTH CARE EDUCATION/TRAINING PROGRAM

## 2022-11-01 PROCEDURE — 84100 ASSAY OF PHOSPHORUS: CPT | Performed by: HOSPITALIST

## 2022-11-01 PROCEDURE — 25500020 PHARM REV CODE 255: Performed by: HOSPITALIST

## 2022-11-01 PROCEDURE — 89051 BODY FLUID CELL COUNT: CPT | Performed by: STUDENT IN AN ORGANIZED HEALTH CARE EDUCATION/TRAINING PROGRAM

## 2022-11-01 PROCEDURE — C1751 CATH, INF, PER/CENT/MIDLINE: HCPCS

## 2022-11-01 PROCEDURE — 87529 HSV DNA AMP PROBE: CPT | Performed by: STUDENT IN AN ORGANIZED HEALTH CARE EDUCATION/TRAINING PROGRAM

## 2022-11-01 PROCEDURE — 83735 ASSAY OF MAGNESIUM: CPT | Performed by: HOSPITALIST

## 2022-11-01 PROCEDURE — 85610 PROTHROMBIN TIME: CPT | Performed by: RADIOLOGY

## 2022-11-01 PROCEDURE — 85025 COMPLETE CBC W/AUTO DIFF WBC: CPT | Performed by: HOSPITALIST

## 2022-11-01 PROCEDURE — 87205 SMEAR GRAM STAIN: CPT | Mod: 59 | Performed by: STUDENT IN AN ORGANIZED HEALTH CARE EDUCATION/TRAINING PROGRAM

## 2022-11-01 PROCEDURE — 94761 N-INVAS EAR/PLS OXIMETRY MLT: CPT

## 2022-11-01 PROCEDURE — 63600175 PHARM REV CODE 636 W HCPCS: Performed by: STUDENT IN AN ORGANIZED HEALTH CARE EDUCATION/TRAINING PROGRAM

## 2022-11-01 PROCEDURE — 36415 COLL VENOUS BLD VENIPUNCTURE: CPT | Performed by: RADIOLOGY

## 2022-11-01 PROCEDURE — 63600175 PHARM REV CODE 636 W HCPCS: Performed by: RADIOLOGY

## 2022-11-01 PROCEDURE — B4185 PARENTERAL SOL 10 GM LIPIDS: HCPCS | Performed by: HOSPITALIST

## 2022-11-01 PROCEDURE — 84157 ASSAY OF PROTEIN OTHER: CPT | Performed by: STUDENT IN AN ORGANIZED HEALTH CARE EDUCATION/TRAINING PROGRAM

## 2022-11-01 PROCEDURE — 36573 INSJ PICC RS&I 5 YR+: CPT

## 2022-11-01 PROCEDURE — A4216 STERILE WATER/SALINE, 10 ML: HCPCS | Performed by: HOSPITALIST

## 2022-11-01 RX ORDER — LIDOCAINE HYDROCHLORIDE 10 MG/ML
1 INJECTION INFILTRATION; PERINEURAL ONCE
Status: COMPLETED | OUTPATIENT
Start: 2022-11-01 | End: 2022-11-01

## 2022-11-01 RX ORDER — SODIUM CHLORIDE 0.9 % (FLUSH) 0.9 %
10 SYRINGE (ML) INJECTION EVERY 6 HOURS
Status: DISCONTINUED | OUTPATIENT
Start: 2022-11-01 | End: 2022-11-11 | Stop reason: HOSPADM

## 2022-11-01 RX ORDER — SODIUM CHLORIDE 0.9 % (FLUSH) 0.9 %
10 SYRINGE (ML) INJECTION
Status: DISCONTINUED | OUTPATIENT
Start: 2022-11-01 | End: 2022-11-11 | Stop reason: HOSPADM

## 2022-11-01 RX ORDER — BROMOCRIPTINE MESYLATE 2.5 MG/1
2.5 TABLET ORAL DAILY
Status: DISCONTINUED | OUTPATIENT
Start: 2022-11-01 | End: 2022-11-05

## 2022-11-01 RX ORDER — LORAZEPAM 2 MG/ML
1 INJECTION INTRAMUSCULAR ONCE
Status: COMPLETED | OUTPATIENT
Start: 2022-11-01 | End: 2022-11-01

## 2022-11-01 RX ORDER — GADOBUTROL 604.72 MG/ML
5 INJECTION INTRAVENOUS
Status: COMPLETED | OUTPATIENT
Start: 2022-11-01 | End: 2022-11-01

## 2022-11-01 RX ADMIN — POTASSIUM PHOSPHATE, MONOBASIC AND POTASSIUM PHOSPHATE, DIBASIC 30 MMOL: 224; 236 INJECTION, SOLUTION, CONCENTRATE INTRAVENOUS at 11:11

## 2022-11-01 RX ADMIN — LORAZEPAM 1 MG: 2 INJECTION INTRAMUSCULAR; INTRAVENOUS at 11:11

## 2022-11-01 RX ADMIN — POTASSIUM PHOSPHATE, MONOBASIC AND POTASSIUM PHOSPHATE, DIBASIC 30 MMOL: 224; 236 INJECTION, SOLUTION INTRAVENOUS at 12:11

## 2022-11-01 RX ADMIN — AMPICILLIN SODIUM 2 G: 2 INJECTION, POWDER, FOR SOLUTION INTRAMUSCULAR; INTRAVENOUS at 01:11

## 2022-11-01 RX ADMIN — ACYCLOVIR SODIUM 330 MG: 50 INJECTION, SOLUTION INTRAVENOUS at 01:11

## 2022-11-01 RX ADMIN — SODIUM CHLORIDE, PRESERVATIVE FREE 10 ML: 5 INJECTION INTRAVENOUS at 11:11

## 2022-11-01 RX ADMIN — DANTROLENE SODIUM 55 MG: 20 INJECTION INTRAVENOUS at 11:11

## 2022-11-01 RX ADMIN — GADOBUTROL 5 ML: 604.72 INJECTION INTRAVENOUS at 11:11

## 2022-11-01 RX ADMIN — DOXYCYCLINE 100 MG: 100 INJECTION, POWDER, LYOPHILIZED, FOR SOLUTION INTRAVENOUS at 07:11

## 2022-11-01 RX ADMIN — LIDOCAINE HYDROCHLORIDE 10 ML: 10 INJECTION, SOLUTION EPIDURAL; INFILTRATION; INTRACAUDAL at 02:11

## 2022-11-01 RX ADMIN — LORAZEPAM 1 MG: 2 INJECTION INTRAMUSCULAR; INTRAVENOUS at 05:11

## 2022-11-01 RX ADMIN — ACETAMINOPHEN 650 MG: 650 SUPPOSITORY RECTAL at 04:11

## 2022-11-01 RX ADMIN — ENOXAPARIN SODIUM 40 MG: 100 INJECTION SUBCUTANEOUS at 06:11

## 2022-11-01 RX ADMIN — LEVOTHYROXINE SODIUM 44 MCG: 20 INJECTION, SOLUTION INTRAVENOUS at 08:11

## 2022-11-01 RX ADMIN — LORAZEPAM 1 MG: 2 INJECTION INTRAMUSCULAR; INTRAVENOUS at 01:11

## 2022-11-01 RX ADMIN — POTASSIUM PHOSPHATE, MONOBASIC AND POTASSIUM PHOSPHATE, DIBASIC 30 MMOL: 224; 236 INJECTION, SOLUTION, CONCENTRATE INTRAVENOUS at 01:11

## 2022-11-01 RX ADMIN — I.V. FAT EMULSION 250 ML: 20 EMULSION INTRAVENOUS at 11:11

## 2022-11-01 RX ADMIN — DANTROLENE SODIUM 55 MG: 20 INJECTION INTRAVENOUS at 06:11

## 2022-11-01 RX ADMIN — ASCORBIC ACID, VITAMIN A PALMITATE, CHOLECALCIFEROL, THIAMINE HYDROCHLORIDE, RIBOFLAVIN-5 PHOSPHATE SODIUM, PYRIDOXINE HYDROCHLORIDE, NIACINAMIDE, DEXPANTHENOL, ALPHA-TOCOPHEROL ACETATE, VITAMIN K1, FOLIC ACID, BIOTIN, CYANOCOBALAMIN: 200; 3300; 200; 6; 3.6; 6; 40; 15; 10; 150; 600; 60; 5 INJECTION, SOLUTION INTRAVENOUS at 10:11

## 2022-11-01 RX ADMIN — LORAZEPAM 1 MG: 2 INJECTION INTRAMUSCULAR; INTRAVENOUS at 02:11

## 2022-11-01 RX ADMIN — VANCOMYCIN HYDROCHLORIDE 1000 MG: 1 INJECTION, POWDER, LYOPHILIZED, FOR SOLUTION INTRAVENOUS at 04:11

## 2022-11-01 RX ADMIN — CEFTRIAXONE 2 G: 2 INJECTION, SOLUTION INTRAVENOUS at 05:11

## 2022-11-01 RX ADMIN — AMPICILLIN SODIUM 2 G: 2 INJECTION, POWDER, FOR SOLUTION INTRAMUSCULAR; INTRAVENOUS at 09:11

## 2022-11-01 NOTE — PROGRESS NOTES
Pt remains free of falls, VS stable, Breath sounds wet and thick, patient suctioned as needed, sputum collected per respiratory, BS in all four quadrants, aphasic, bed in low position with wheels locked call light in reach. Patient continues in soft restraints, he is confused and pulls at lines, no signs of trauma to wrists which were monitored closely, patient wife stated he was pounding on a lot of different things with his hands when he became confused at home, he has minor scratches and bruises from this incident, Wound care is consulted for WC,

## 2022-11-01 NOTE — ASSESSMENT & PLAN NOTE
Contributing Nutrition Diagnosis  Moderate chronic condition related malnutrition    Related to (etiology):   AMS    Signs and Symptoms (as evidenced by):   1) PO intakes < 75% of needs x > 1 month  2) mild wasting as charted below    Interventions:  Collaboration with other providers    Nutrition Diagnosis Status:   new

## 2022-11-01 NOTE — PROGRESS NOTES
Progress Note  Infectious Disease    Reason for Consult:  AMS + Fever rule out meningitis     HPI: Ronn Caballero Jr. is a 72 y.o. male with past medical history of thyroid cancer, frontal temporal dementia, hypertension, hyperlipidemia, depression/anxiety who was transferred from [facility] due to 6 days of failure to thrive.  History obtain from wife at bedside.  She states patient started acting aggressive 2 weeks ago, she had to call the ambulance and patient was placed at Home.  Of note patient was taking Zyprexa 2.5 mg daily and dose was increased 10 mg twice a day.  He was also started on gabapentin 3 times a day.  Wife states she was not able to communicate with the facility for about a week until she called her daughter for assistance and was advised to bring the patient to the emergency room.  As per records, patient received Haldol x4, Benadryl, and Ativan x2.  Reports of not eating, not drinking noted.  No personal history of seizures.  She states patient has shuffled gait at baseline, last time she saw him at home he did not have cough, shortness of breath, nausea or vomiting, or any complaint before being transferred to psychiatric facility.    In the ER, patient hypertensive, afebrile   Labs on admission, leukocytosis 17.4, left shift 85%, bands 2%, H&H 13.7/41.9, platelet count 274   Hypernatremia 153  Creatinine 1.2   /ALT 42   CPK 3221   Lactic acid 1.2   Procalcitonin 0.3  UA with no pyuria, occasional bacteria, negative for UTI   Blood cultures x2 no growth to date, pending final   Chest x-ray clear   CT head with calcified mass in the floor of the right middle cranial fossa, most suggestive of petrous apex meningioma.  Incidental finding.  Essential negative study, aside from atrophy.    CT abdomen/pelvis limited by motion, cyst on left kidney.  Constipation.  Nonobstructive renal stone.    Hospital course complicated by fever of  101 10/30 and 102.5 today.   ID consult to rule out meningitis. Empirically started on Vancomycin and Ceftriaxone IV.     INTERVAL HISTORY:  11/1: Interim reviewed, patient seen and examined at beside, remains somnolent, not following commands, gargling. Hypertensive, Tmax 102.3 overnight, currently afebrile. Discussed with neurology, will try empiric dantrolene, ordered MRI brain and attempt LP. Labs reviewed, WBC 9.8, left shift 75.4%, creatinie stable. Phsophorus 1.7, AST 56 trending down, ALT normal. Micro reviewed, no growth so far.     Review of patient's allergies indicates:  No Known Allergies  Past Medical History:   Diagnosis Date    Cancer     Dementia     Depression     Hypertension      Past Surgical History:   Procedure Laterality Date    Lymph node Ca removed       Social History     Tobacco Use    Smoking status: Not on file    Smokeless tobacco: Not on file   Substance Use Topics    Alcohol use: Not on file        No family history on file.    Pertinent medications noted: Zyprexa/haldol/Ativan    Review of Systems: Patient somnolent, unable to follow commands. No family at bedside.     Outdoor activities: From home, recently admitted to [place] for abnormal behavior in the setting of frontotemporal dementia.  Travel: None  Implants: None  Antibiotic History: Vanco/Ceftriaxone    EXAM & DIAGNOSTICS REVIEWED:   Vitals:     Temp:  [97.1 °F (36.2 °C)-102.3 °F (39.1 °C)]   Temp: 99 °F (37.2 °C) (11/01/22 0530)  Pulse: 72 (11/01/22 0406)  Resp: 19 (11/01/22 0406)  BP: (!) 160/70 (11/01/22 0406)  SpO2: 96 % (11/01/22 0813)    Intake/Output Summary (Last 24 hours) at 11/1/2022 0828  Last data filed at 11/1/2022 0619  Gross per 24 hour   Intake 2628.54 ml   Output 650 ml   Net 1978.54 ml       General:  Ill-appearing, decreased flushed on face and neck, gargling, somnolent  Eyes:  Eyes shut, unable to open  ENT:  Dry thick oral secretions on tongue and soft palate, no thrush  Neck:  Rigid  Lungs: Coarse breath sounds b/l  Heart:  S1/S2+,  regular rhythm, no murmurs  Abd:  +BS, soft, non tender, non distended, no rebound  :  Voids  Musc:  Upper extremities contracted, joints without effusion, swelling,  erythema, synovitis  Skin:  Warm, R arm with redness on AC fossa, indurated area noted, sees like prior IV infiltrated, scab noted, see pic below   Wound:   Neuro:  Somnolent, not following commands, upper trunk rigidity   Psych:    Lymphatic:       Extrem: No LE edema b/l  VAD:  Peripheral IV       Isolation: None    10/31:        General Labs reviewed:  Recent Labs   Lab 10/30/22  0428 10/31/22  0440 11/01/22  0447   WBC 9.24 7.73 9.89   HGB 11.0* 11.5* 11.3*   HCT 33.4* 34.4* 33.7*    221 224       Recent Labs   Lab 10/30/22  0428 10/31/22  0440 11/01/22  0447   * 149* 144   K 3.3* 3.2* 2.9*   * 120* 112*   CO2 22* 22* 22*   BUN 30* 19 14   CREATININE 0.9 0.8 0.9   CALCIUM 9.0 9.1 8.7   PROT 5.6* 5.5* 5.6*   BILITOT 0.7 0.7 0.7   ALKPHOS 83 85 79   ALT 44 48* 42   * 89* 56*     No results for input(s): CRP in the last 168 hours.  No results for input(s): SEDRATE in the last 168 hours.    Estimated Creatinine Clearance: 61.3 mL/min (based on SCr of 0.9 mg/dL).     Micro:  Microbiology Results (last 7 days)       Procedure Component Value Units Date/Time    Blood culture (site 2) [[id number]] Collected: 10/29/22 1543    Order Status: Completed Specimen: Blood Updated: 11/01/22 0613     Blood Culture, Routine No Growth to date      No Growth to date      No Growth to date    Narrative:      Site # 2, aerobic only    Blood culture (site 1) [[id number]] Collected: 10/29/22 1542    Order Status: Completed Specimen: Blood Updated: 11/01/22 0613     Blood Culture, Routine No Growth to date      No Growth to date      No Growth to date    Narrative:      Site # 1, aerobic and anaerobic            Imaging Reviewed:  CXR  CT head   CT head/neck  CT abdomen/pelvis     Cardiology: EKG Qtc 496ms       IMPRESSION & PLAN     AMS + Fever,  rule out meningitis vs NMS vs Serotoninergic syndrome in the setting of recent increased dose of Zyprexa and other neuroleptics, favoring the latter  Blood cultures x 2 no growth     2. Rhabdomyolysis   3. Transaminitis   4. PMHx: thyroid cancer, frontal temporal dementia, hypertension, hyperlipidemia, depression/anxiety     Recommendations:  LP when feasible to rule out meningitis; please send fluid for cell count,Gram stain and cultures  Follow cultures   Continue Vancomycin IV, keep level 15-20  Ceftriaxone 2g IV q12h  Ampicillin 2g IV q6h for Listeria  Acyclovir 330mg IV q8h  Gentle IV fluids  MRI brain   Neurology recommendations appreciated   Aspiration precautions  Oral hygiene  Incentive spirometry    Will follow     D/w Dr Calle    Medical Decision Making during this encounter was  [_] Low Complexity  [_] Moderate Complexity  [xx] High Complexity

## 2022-11-01 NOTE — SUBJECTIVE & OBJECTIVE
Interval History: pt contracted in fetal position in bed, agitated when stimulated, plan for LP and MRI, abx broaden to cover CNS     Review of Systems   Unable to perform ROS: Psychiatric disorder   Objective:     Vital Signs (Most Recent):  Temp: 100.2 °F (37.9 °C) (11/01/22 1100)  Pulse: 76 (11/01/22 1100)  Resp: 15 (11/01/22 1100)  BP: (!) 128/54 (11/01/22 1100)  SpO2: 95 % (11/01/22 1402) Vital Signs (24h Range):  Temp:  [97.4 °F (36.3 °C)-101 °F (38.3 °C)] 100.2 °F (37.9 °C)  Pulse:  [64-87] 76  Resp:  [15-20] 15  SpO2:  [92 %-97 %] 95 %  BP: (122-160)/(54-70) 128/54     Weight: 56.7 kg (125 lb)  Body mass index is 19.58 kg/m².    Intake/Output Summary (Last 24 hours) at 11/1/2022 1701  Last data filed at 11/1/2022 1131  Gross per 24 hour   Intake 2628.54 ml   Output 800 ml   Net 1828.54 ml        Physical Exam  Constitutional:       General: He is in acute distress.      Comments: Restless in restraints    Cardiovascular:      Rate and Rhythm: Normal rate and regular rhythm.      Heart sounds: No murmur heard.  Pulmonary:      Effort: Pulmonary effort is normal.      Breath sounds: Normal breath sounds. No wheezing.   Abdominal:      General: There is no distension.      Palpations: There is no mass.   Musculoskeletal:         General: Normal range of motion.   Skin:     General: Skin is warm and dry.   Neurological:      Mental Status: He is disoriented.      Comments: Not able to follow commands due to agitation    Psychiatric:      Comments: Yelling, screaming, cussing        Significant Labs: All pertinent labs within the past 24 hours have been reviewed.  Blood Culture:   No results for input(s): LABBLOO in the last 48 hours.    CBC:   Recent Labs   Lab 10/31/22  0440 11/01/22 0447   WBC 7.73 9.89   HGB 11.5* 11.3*   HCT 34.4* 33.7*    224       CMP:   Recent Labs   Lab 10/31/22  0440 11/01/22  0447   * 144   K 3.2* 2.9*   * 112*   CO2 22* 22*    121*   BUN 19 14   CREATININE  0.8 0.9   CALCIUM 9.1 8.7   PROT 5.5* 5.6*   ALBUMIN 2.8* 2.6*   BILITOT 0.7 0.7   ALKPHOS 85 79   AST 89* 56*   ALT 48* 42   ANIONGAP 7* 10       Cardiac Markers: No results for input(s): CKMB, MYOGLOBIN, BNP, TROPISTAT in the last 48 hours.  Lactic Acid:   No results for input(s): LACTATE in the last 48 hours.    Lipase:   No results for input(s): LIPASE in the last 48 hours.    Lipid Panel: No results for input(s): CHOL, HDL, LDLCALC, TRIG, CHOLHDL in the last 48 hours.  Magnesium:   Recent Labs   Lab 10/31/22  0440 11/01/22  0447   MG 2.0 1.8       POCT Glucose: No results for input(s): POCTGLUCOSE in the last 48 hours.  Troponin:   No results for input(s): TROPONINI, TROPONINIHS in the last 48 hours.    TSH:   Recent Labs   Lab 10/23/22  1415   TSH 0.976       Urine Studies:   Recent Labs   Lab 10/31/22  1610   COLORU Yellow   APPEARANCEUA Clear   PHUR 6.0   SPECGRAV 1.010   PROTEINUA Negative   GLUCUA Negative   KETONESU Negative   BILIRUBINUA Negative   OCCULTUA Trace*   NITRITE Negative   UROBILINOGEN Negative   LEUKOCYTESUR Negative         Significant Imaging: I have reviewed all pertinent imaging results/findings within the past 24 hours.

## 2022-11-01 NOTE — PROGRESS NOTES
[facility] Hospital Medicine  Progress Note    Patient Name: Ronn Caballero Jr.  MRN: [id number]  Patient Class: IP- Inpatient   Admission Date: 10/29/2022  Length of Stay: 3 days  Attending Physician: Chelo Gibbs MD  Primary Care Provider: JELLY Decker        Subjective:     Principal Problem:Dementia with behavioral disturbance        HPI:  73 yo male with h/o thyroid cancer, frontal temporal lobe dementia, HTN, HLP, depression and anxiety transferred from [facility] due to 6 days of failure to thrive. It is reported from the facility that he was PEC/CEC (expires 11/3/22) due to combative behavior at home. History comes from the patient's spouse and sister-in -law at bedside. Patient follows a neurologist in [place] and spouse reports recent change in medications include increase of zyprexa from 2.5mg to 10mg BID. He was also started on gabapentin TID. Spouse denies recent fever, chills, cough, D/N/V.  Per the MAR from [facility] he received Haldol x4 IM, benadryl and ativan IV x2. The facility reports he was not eating or drinking and no witnessed episodes of diarrhea or vomiting. On our workup he has Na153, elevated  AST, hematuria, WBC 17k with 2% bands, lactic acid 1.2. No source of infection on UA or chest xray. On exam he does show discomfort in RUQ and midepigastrium. Abd CT and lipase pending. Troponin mildly elevated 0.056 with some EKG changes.     Hospital medicine consulted for further medical management. Physical restraints will be used to protect patient as he is pulling at cardiac leads and peripheral IV. Neurology consulted. Needs tele sitter.       Overview/Hospital Course:  Patient admitted from [facility] facility with failure to thrive and progressive psychosis. He has h/o frontal temporal dementia. Overnight he was given IV ativan and has no improvement in behavior. Patient unable to participate in a telepsych visit.  Neurology was consulted on guidance to medications. Appears he was given a combination of meds in addition to his home medication and possibly polypharmacy resulted in psychosis. He does require restraints as he is a harm to himself. Will try to resume home medications if he can follow commands to swallow pills. Electrolyte replacement by IV. IVF for hypernatremia and elevated CP >3000. Monitoring on tele due to QT prolongation. The spouse and daughter at bedside and agree to palliative care discussion in regards to goals of care and advanced care planning.     Fevers persistent - started on amp, rocephin, vanc and acyclovir. LP completed and studies pending. Brain MRI: poor study due to motion artifact, generalized volume loss. Zyprexa dc'd, concern for NMS. Ativan IV Ordered. Restraints remain in place. Picc line placed to RUE. TPN ordered for nutrition. Unable to take oral meds and diet due to mental status. CK trending down 1700. Na 144.       Interval History: pt contracted in fetal position in bed, agitated when stimulated, plan for LP and MRI, abx broaden to cover CNS     Review of Systems   Unable to perform ROS: Psychiatric disorder   Objective:     Vital Signs (Most Recent):  Temp: 100.2 °F (37.9 °C) (11/01/22 1100)  Pulse: 76 (11/01/22 1100)  Resp: 15 (11/01/22 1100)  BP: (!) 128/54 (11/01/22 1100)  SpO2: 95 % (11/01/22 1402) Vital Signs (24h Range):  Temp:  [97.4 °F (36.3 °C)-101 °F (38.3 °C)] 100.2 °F (37.9 °C)  Pulse:  [64-87] 76  Resp:  [15-20] 15  SpO2:  [92 %-97 %] 95 %  BP: (122-160)/(54-70) 128/54     Weight: 56.7 kg (125 lb)  Body mass index is 19.58 kg/m².    Intake/Output Summary (Last 24 hours) at 11/1/2022 1701  Last data filed at 11/1/2022 1131  Gross per 24 hour   Intake 2628.54 ml   Output 800 ml   Net 1828.54 ml        Physical Exam  Constitutional:       General: He is in acute distress.      Comments: Restless in restraints    Cardiovascular:      Rate and Rhythm: Normal rate and  regular rhythm.      Heart sounds: No murmur heard.  Pulmonary:      Effort: Pulmonary effort is normal.      Breath sounds: Normal breath sounds. No wheezing.   Abdominal:      General: There is no distension.      Palpations: There is no mass.   Musculoskeletal:         General: Normal range of motion.   Skin:     General: Skin is warm and dry.   Neurological:      Mental Status: He is disoriented.      Comments: Not able to follow commands due to agitation    Psychiatric:      Comments: Yelling, screaming, cussing        Significant Labs: All pertinent labs within the past 24 hours have been reviewed.  Blood Culture:   No results for input(s): LABBLOO in the last 48 hours.    CBC:   Recent Labs   Lab 10/31/22  0440 11/01/22  0447   WBC 7.73 9.89   HGB 11.5* 11.3*   HCT 34.4* 33.7*    224       CMP:   Recent Labs   Lab 10/31/22  0440 11/01/22  0447   * 144   K 3.2* 2.9*   * 112*   CO2 22* 22*    121*   BUN 19 14   CREATININE 0.8 0.9   CALCIUM 9.1 8.7   PROT 5.5* 5.6*   ALBUMIN 2.8* 2.6*   BILITOT 0.7 0.7   ALKPHOS 85 79   AST 89* 56*   ALT 48* 42   ANIONGAP 7* 10       Cardiac Markers: No results for input(s): CKMB, MYOGLOBIN, BNP, TROPISTAT in the last 48 hours.  Lactic Acid:   No results for input(s): LACTATE in the last 48 hours.    Lipase:   No results for input(s): LIPASE in the last 48 hours.    Lipid Panel: No results for input(s): CHOL, HDL, LDLCALC, TRIG, CHOLHDL in the last 48 hours.  Magnesium:   Recent Labs   Lab 10/31/22  0440 11/01/22  0447   MG 2.0 1.8       POCT Glucose: No results for input(s): POCTGLUCOSE in the last 48 hours.  Troponin:   No results for input(s): TROPONINI, TROPONINIHS in the last 48 hours.    TSH:   Recent Labs   Lab 10/23/22  1415   TSH 0.976       Urine Studies:   Recent Labs   Lab 10/31/22  1610   COLORU Yellow   APPEARANCEUA Clear   PHUR 6.0   SPECGRAV 1.010   PROTEINUA Negative   GLUCUA Negative   KETONESU Negative   BILIRUBINUA Negative    OCCULTUA Trace*   NITRITE Negative   UROBILINOGEN Negative   LEUKOCYTESUR Negative         Significant Imaging: I have reviewed all pertinent imaging results/findings within the past 24 hours.      Assessment/Plan:      * Dementia with behavioral disturbance  Sharp change in status. Possibly infectious or metabolic   CT of abdomen and pelvis - left kidney mass vs complex cyst, non obstructing stone   IVF   Neurology consulted - patient unable to do tele psych visit at this time   [facility]  [facility]   Neurology recs: - resume home meds- if able to follow commands and swallow properly   Restraints if danger to himself  Tele monitoring     Head CTA if able to be still       Moderate malnutrition  Nutrition consulted. Most recent weight and BMI monitored-          Malnutrition (Moderate to Severe)  Energy Intake (Malnutrition): less than 75% for greater than or equal to 1 month    Final Summary  Subcutaneous Fat Loss (Final Summary): mild protein-calorie malnutrition  Muscle Loss Evaluation (Final Summary): mild protein-calorie malnutrition         Measurements:  Wt Readings from Last 1 Encounters:   11/01/22 56.7 kg (125 lb)   Body mass index is 19.58 kg/m².    Recommendations: Recommendation/Intervention: 1) Start TPN via PICC when placed: D15 AA 5 @ 70 ml/hr + standard lipids ( provides 84 g protein ( 100% EPN), 1643 kcal ( 100% EEN)) 2) If pt remains NPO and NGT placement become possible or PEG needed longterm rec TF: Peptamen 1.5 Prebio @ 20 ml/hr advancing to goal of 45 ml/hr + 130 ml flush q 4 hr  ( provides 1620 kcal ( 100% EEN), 73 g protein ( 100% EPN),and 831 ml free water) 3) If AMS improves and able to advance diet rec. regular, texure per SLP to encourage PO intakes 4) weigh weekly  Goals: 1) Nutrition support meeting > 75% of needs at f/u    Patient has been screened and assessed by RD. RD will follow patient.    TPN until patient is alert for oral diet       Goals of care,  counseling/discussion  Spouse decided on DNR   Appreciate palliative care meeting with family      Fever  CXR:  PICC placed in good position.  1.5 cm density projecting over the right mid lung feel likely represents overlapping shadows but follow-up chest x-rays are recommended to assure resolution and absence of a pulmonary lesion  CTA head and neck :  4. Right upper lobe ground-glass opacities in patchy consolidation, nonspecific and may reflect sequelae of small airways inflammation or infection.     UA reflex to culture - NTD  Blood culture- NTD  CSF - pending     Ampicillin, acyclovir, rocephin and vancomycin   Resp did deep suction - will send for culture       Bandemia  Leukocytosis - unknown etiology  LA 1.2 and no fever  UA - no signs of infections  CXR - clear   CT of abdomen  Trend CBC - WBC normal range and no bandemia on repeat - with IVF      Frontotemporal dementia  Follows neurologist in [place] with recent increases in meds  Trileptal level pending  Neuro recs: resume home meds - not able to safely take them - further recs?      Postoperative hypothyroidism  Resume synthroid   FT4 - normal       Hypernatremia    d5 .45 NS IVF  BMp in am - Na slowly trending down         Failure to thrive in adult  IVF until mental status is appropriate for oral diet  Progression of dementia  Palliative care discussion     If neuro status continues, will need to place NGT for meds and nutrition       essential hypertension  If passes swallow eval, resume home norvasc  IV labetalol PRN         VTE Risk Mitigation (From admission, onward)         Ordered     enoxaparin injection 40 mg  Daily         10/29/22 1513     IP VTE HIGH RISK PATIENT  Once         10/29/22 1513     Place sequential compression device  Until discontinued         10/29/22 1513                Discharge Planning   TAYLOR: 11/4/2022     Code Status: DNR   Is the patient medically ready for discharge?:     Reason for patient still in hospital (select  all that apply): Patient unstable  Discharge Plan A: Psychiatric hospital                  Chelo Gibbs MD  Department of Hospital Medicine   [facility]

## 2022-11-01 NOTE — PLAN OF CARE
Recommendations   1) Start TPN via PICC when placed: D15 AA 5 @ 70 ml/hr + standard lipids   ( provides 84 g protein ( 100% EPN), 1643 kcal ( 100% EEN))     2) If pt remains NPO and NGT placement become possible or PEG needed longterm rec TF:   Peptamen 1.5 Prebio @ 20 ml/hr advancing to goal of 45 ml/hr + 130 ml flush q 4 hr    ( provides 1620 kcal ( 100% EEN), 73 g protein ( 100% EPN),and 831 ml free water)     3) If AMS improves and able to advance diet rec. regular, texure per SLP to encourage PO intakes   4) weigh weekly, replete phos as needed    Goals: 1) Nutrition support meeting > 75% of needs at f/u  Nutrition Goal Status: new  Communication of RD Recs: reviewed with physician (POC, sticky note)

## 2022-11-01 NOTE — CARE UPDATE
11/01/22 0813   PRE-TX-O2   O2 Device (Oxygen Therapy) room air   SpO2 96 %   Pulse Oximetry Type Intermittent   $ Pulse Oximetry - Multiple Charge Pulse Oximetry - Multiple

## 2022-11-01 NOTE — PROCEDURES
"Ronn Caballero Jr. is a 72 y.o. male patient.    Temp: 100.2 °F (37.9 °C) (11/01/22 1100)  Pulse: 76 (11/01/22 1100)  Resp: 15 (11/01/22 1100)  BP: (!) 128/54 (11/01/22 1100)  SpO2: 95 % (11/01/22 1402)  Weight: 56.7 kg (125 lb) (11/01/22 1235)  Height: 5' 7" (170.2 cm) (11/01/22 1235)    PICC  Date/Time: 11/1/2022 4:27 PM  Performed by: Franchesca Ahuja RN  Consent Done: Yes  Time out: Immediately prior to procedure a time out was called to verify the correct patient, procedure, equipment, support staff and site/side marked as required  Indications: med administration and vascular access  Anesthesia: local infiltration  Local anesthetic: lidocaine 1% without epinephrine  Anesthetic Total (mL): 5  Preparation: skin prepped with ChloraPrep  Skin prep agent dried: skin prep agent completely dried prior to procedure  Sterile barriers: all five maximum sterile barriers used - cap, mask, sterile gown, sterile gloves, and large sterile sheet  Hand hygiene: hand hygiene performed prior to central venous catheter insertion  Location details: left brachial  Catheter type: double lumen  Catheter size: 5 Fr  Catheter Length: 44cm    Ultrasound guidance: yes  Vessel Caliber: large and patent, compressibility normal  Needle advanced into vessel with real time Ultrasound guidance.  Guidewire confirmed in vessel.  Image recorded and saved.  Sterile sheath used.  Number of attempts: 1  Post-procedure: blood return through all ports, chlorhexidine patch and sterile dressing applied  Technical procedures used: MST 3CG MAX BARRIER  Specimens: No    Assessment: placement verified by x-ray, tip termination and successful placement  Complications: none        Name TEJ Ahuja RN  11/1/2022    "

## 2022-11-01 NOTE — CHAPLAIN
"Visited pt and wife per prayer/visit request card submitted in prayer box' spent time with pt at first; he was "sleeping" eyes closed, but moaning; did not disturb, but spoke softly to him reminding him that he is loved; said blessing over him, providing compassionate presence.     His wife Georgia entered the room and was happy to see me, she was the one who wrote the card. She and pt have been  51 years and they have 3 adult children. Wife is Jew [employer] and  is [employer]ec[employer]alism. She says she has a lot of family support, love and prayers that this journey has been difficult.     Wife requested  to come and provided anointing "last rites"- she said she doesn't believe death is imminent, but she's certain that her  would want the ritual.     I confirmed with Charge nurse that  can come (PEC and CV rule out) and called [facility].  will be here today; wife appreciated the visit, the prayer and fayg. Lord, in your mercy.  "

## 2022-11-01 NOTE — PROGRESS NOTES
[facility]  Progress Note  Date: 2022 9:36 AM            Patient Name: Ronn Caballero Jr.   MRN: [id number]   : 1950    AGE: 72 y.o.    LOS: 3 days Hospital Day: 4  Admit date: 10/29/2022 10:53 AM         HPI per EMR: Ronn Caballero Jr. is a 71 yo male with h/o thyroid cancer, frontal temporal lobe dementia, HTN, HLP, depression and anxiety transferred from [facility] due to 6 days of failure to thrive. It is reported from the facility that he was PEC/CEC (expires 11/3/22) due to combative behavior at home. History comes from the patient's spouse and sister-in -law at bedside. Patient follows a neurologist in [place] and spouse reports recent change in medications include increase of zyprexa from 2.5mg to 10mg BID. He was also started on gabapentin TID. Spouse denies recent fever, chills, cough, D/N/V.  Per the MAR from [place] he received Haldol x4 IM, benadryl and ativan IV x2. The facility reports he was not eating or drinking and no witnessed episodes of diarrhea or vomiting. On our workup he has Na153, elevated  AST, hematuria, WBC 17k with 2% bands, lactic acid 1.2. No source of infection on UA or chest xray. On exam he does show discomfort in RUQ and midepigastrium. Abd CT and lipase pending. Troponin mildly elevated 0.056 with some EKG changes.      Hospital medicine consulted for further medical management. Physical restraints will be used to protect patient as he is pulling at cardiac leads and peripheral IV. Neurology consulted. Needs tele sitter.     10/31/2022:  Patient was seen and examined by me this morning.  He is encephalopathic and only able to tell me his name on repeated questioning.  Patient's wife was at bedside and states that he has a history of frontotemporal dementia however last  he suddenly became altered and combative for which he was taken to the hospital and then to [facility].    His CK  "has been elevated and he has been encephalopathic and febrile.     11/01/2022:  Patient was seen examined by me this morning.  He remains to be somnolent and exam unchanged.         Vitals:  Patient Vitals for the past 24 hrs:   BP Temp Temp src Pulse Resp SpO2 Height   11/01/22 0909 -- -- -- -- -- -- 5' 7" (1.702 m)   11/01/22 0813 -- -- -- -- -- 96 % --   11/01/22 0530 -- 99 °F (37.2 °C) Oral -- -- -- --   11/01/22 0406 (!) 160/70 (!) 101 °F (38.3 °C) Oral 72 19 97 % --   10/31/22 2317 127/62 98.3 °F (36.8 °C) Oral 71 16 (!) 94 % --   10/31/22 1953 -- -- -- 87 20 95 % --   10/31/22 1925 (!) 129/59 97.4 °F (36.3 °C) Axillary 78 20 (!) 94 % --   10/31/22 1800 -- 99.3 °F (37.4 °C) Axillary -- -- -- --   10/31/22 1639 -- (!) 102.3 °F (39.1 °C) -- -- -- -- --   10/31/22 1608 111/64 (!) 102.3 °F (39.1 °C) Oral 99 18 (!) 94 % --   10/31/22 1300 -- -- -- 92 20 -- --   10/31/22 1238 (!) 163/90 97.1 °F (36.2 °C) Oral (!) 121 18 95 % --     PHYSICAL EXAM:     GENERAL APPEARANCE:  Patient is somnolent, wakes up to repeated stimulus however does not answer to questions or follow commands.  He does not follow commands  HEENT: Normocephalic and atraumatic. PERRL. Oropharynx unremarkable.  PULM: Comfortable on room air.  CV: RRR.  ABDOMEN: Soft, nontender.  EXTREMITIES: No signs of vascular compromise. Pulses present. No cyanosis, clubbing or edema.  SKIN: Clear; no rashes, lesions or skin breaks in exposed areas.      NEURO:   MENTAL STATUS: Patient is somnolent, wakes up to repeated stimulus however does not answer to questions or follow commands.  CRANIAL NERVES II-XII:  Face is grossly symmetric.  Unable to assess any other cranial nerves due to his mental status  MOTOR: Normal bulk.  Increased tone in upper and lower extremities bilaterally.  Moves all extremities to stimulus symmetrically.  REFLEXES: DTRs 2+; normal and symmetric throughout.   SENSATION: Sensation cannot be tested  COORDINATION:  Cannot be tested  STATION: " [place] deferred.  GAIT: Deferred.    CURRENT SCHEDULED MEDICATIONS:   acyclovir  5 mg/kg (Ideal) Intravenous Q8H    amLODIPine  10 mg Oral Daily    ampicillin 2 g in sodium chloride 0.9 % 100 mL IVPB (ready to mix system)  2 g Intravenous Q6H    aspirin  81 mg Oral Daily    atorvastatin  40 mg Oral Daily    bromocriptine  2.5 mg Oral Daily    cefTRIAXone (ROCEPHIN) IVPB  2 g Intravenous Q12H    dantrolene (DANTRIUM) IVPB  1 mg/kg Intravenous Q6H    enoxaparin  40 mg Subcutaneous Daily    levothyroxine  44 mcg Intravenous Daily    lorazepam  1 mg Intravenous Q8H    OXcarbazepine  300 mg Oral BID    potassium phosphate IVPB  30 mmol Intravenous Once    scopolamine  1 patch Transdermal Q3 Days    senna-docusate 8.6-50 mg  1 tablet Oral BID    vancomycin (VANCOCIN) IVPB  1,000 mg Intravenous Q12H     CURRENT INFUSIONS:   dextrose 5 % and 0.45 % NaCl 75 mL/hr at 11/01/22 0619     DATA:  Recent Labs   Lab 10/29/22  1121 10/30/22  0428 10/31/22  0440 11/01/22  0447   * 150* 149* 144   K 4.3 3.3* 3.2* 2.9*   * 120* 120* 112*   CO2 24 22* 22* 22*   BUN 42* 30* 19 14   CREATININE 1.2 0.9 0.8 0.9    112* 105 121*   CALCIUM 10.7* 9.0 9.1 8.7   PHOS  --  2.0* 2.5* 1.7*   MG  --  2.1 2.0 1.8   * 108* 89* 56*   ALT 42 44 48* 42     Recent Labs   Lab 10/29/22  1121 10/30/22  0428 10/31/22  0440 11/01/22 0447   WBC 17.42* 9.24 7.73 9.89   HGB 13.7* 11.0* 11.5* 11.3*   HCT 41.9 33.4* 34.4* 33.7*    217 221 224     No results found for: PROTEINCSF, GLUCCSF, WBCCSF, RBCCSF  Hemoglobin A1c   Date Value Ref Range Status   05/09/2018 5.6 4.2 - 6.3 % Final            I have personally reviewed and interpreted the pertinent imaging and lab results.  Imaging Results               CT Abdomen Pelvis  Without Contrast (Final result)  Result time 10/29/22 16:26:14      Final result by Luana Horner MD (10/29/22 16:26:14)                   Impression:      Study limited by motion, demonstrating a complicated  cyst or solid mass extending from the lower pole of the left kidney.  This should be worked up further with a study with IV contrast when the patient is able to remain still.    Nonobstructing right renal calculus.    Constipation.    This report was flagged in Epic as abnormal.      Electronically signed by: Luana Horner  Date:    10/29/2022  Time:    16:26               Narrative:    EXAMINATION:  CT ABDOMEN PELVIS WITHOUT CONTRAST    CLINICAL HISTORY:  Abdominal abscess/infection suspected; failure to thrive    TECHNIQUE:  Low dose axial images, sagittal and coronal reformations were obtained from the lung bases to the pubic symphysis.  Neither oral nor IV contrast was administered.  The patient had a difficult time remaining still for image acquisition    COMPARISON:  None    FINDINGS:  Consolidation in the dependent portions of the lung bases is likely due to atelectasis.    Limited noncontrast images of the liver, gallbladder, spleen, adrenal glands, pancreas are grossly normal.  There is a nonobstructing 5 mm right lower pole renal calculus.  There is a 3.2 x 3.5 cm structure with a density of 46 Hounsfield units extending from the lower pole of the left kidney series 4, image 99.  This may be a complex cyst or solid mass.  No adenopathy is noted.    The aorta is normal in caliber.    There is generalized fecal stasis throughout the colon.  No evidence of a bowel obstruction.  Urinary bladder unremarkable.  There is no free intraperitoneal fluid or air.  No acute findings in the bones.                                       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  This is likely an incidental finding.  It produces no mass effect/edema on the adjacent brain.   Otherwise, essentially negative study, aside from atrophy.      Electronically signed by: Luana Horner  Date:    10/29/2022  Time:    12:01               Narrative:    EXAMINATION:  CT HEAD WITHOUT CONTRAST    CLINICAL HISTORY:  Mental status change, unknown cause;    TECHNIQUE:  Low dose axial images were obtained through the head.  Coronal and sagittal reformations were also performed. Contrast was not administered.    COMPARISON:  None    FINDINGS:  The study is mildly limited by motion.  There is no intra or extra-axial hemorrhage.  No mass effect, edema or midline shift is present.  Mild generalized atrophy, particularly central atrophy is noted.  There is no skull fracture.  The visualized paranasal sinuses are clear.    There is 1.6 cm calcified mass in the floor of the right anterior cranial fossa, extending from the petrous temporal bone.  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  There is no edema in the adjacent temporal lobe.                                       X-Ray Chest AP Portable (Final result)  Result time 10/29/22 11:35:49      Final result by Luana Horner MD (10/29/22 11:35:49)                   Impression:      No acute cardiopulmonary disease.      Electronically signed by: Luana Horner  Date:    10/29/2022  Time:    11:35               Narrative:    EXAMINATION:  XR CHEST AP PORTABLE    CLINICAL HISTORY:  Altered mental status, unspecified    TECHNIQUE:  Single frontal view of the chest was performed.    COMPARISON:  05/28/2018    FINDINGS:  The patient is mildly rotated to the right.  The heart size is normal.  The aorta is ectatic.  The lungs are clear.  There is no pleural effusion or acute bony abnormality.                                              ASSESSMENT AND PLAN:    Encephalopathy    Etiology of encephalopathy could be multifactorial.  Patient has been febrile and there is concern for infectious encephalopathy versus worsening dementia versus  polypharmacy vs intracranial infection. Also in the differential is NMS vs Serotonin syndrome(less likely with no significant hyperreflexia) as CK has been elevated(tone was not able to be assessed) and been febrile.  Continue IV fluids and scheduled ativan  Patient is on Trileptal at home which is likely for mood.  Patient does not have any history of seizures as per his wife.  If cannot take p.o. medication, recommend switching it to valproic acid 250 mg t.i.d. for mood  Recommend psychiatry consult  I discussed with primary team regarding the need for lumbar puncture to rule out an intracranial infection.  Was told patient is not able to cooperate for lumbar puncture.  At this time patient would benefit from borad antibiotic treatment until lumbar puncture can be performed. ID on board  Started on Dantrolene, scheduled ativan, Bromocriptime(if can take PO) for possible NMS.   Continue IV fluids and trend CK  Hold Zyprexa and Fluoxetine  Speech, PT OT when able  DVT prophylaxis  Will follow          44 minutes of care time has been spent evaluating with the patient. Time includes chart review not limited to diagnostic imaging, labs, and vitals, patient assessment, discussion with family and nursing, current order evaluations, and new order entries.      Lobito Calle MD  Neurology/vascular Neurology  Date of Service: 11/01/2022  9:36 AM    Please note: This note was transcribed using voice recognition software. Because of this technology there are often uinintended grammatical, spelling, and other transcription errors. Please disregard these errors.

## 2022-11-01 NOTE — CARE UPDATE
11/01/22 1402   Patient Assessment/Suction   Level of Consciousness (AVPU) responds to pain   Suction Method nasal;tracheal   $ Suction Charges NT Suction Procedure   Secretions Amount copious   Secretions Color pale;yellow   Secretions Characteristics thick

## 2022-11-01 NOTE — CARE UPDATE
11/01/22 1806   Patient Assessment/Suction   Level of Consciousness (AVPU) responds to pain   Suction Method nasal;tracheal   $ Suction Charges NT Suction Procedure   Secretions Amount copious   Secretions Color pale;yellow   Secretions Characteristics thick   Sputum Collection sample obtained per suctioning   $ Swab or suction? Suction;Right nare   Aspirate Toleration JASON (no adverse reactions)   Sent to the lab? Yes

## 2022-11-01 NOTE — NURSING
Pt arrived from [facility] via bed with sitter. Procedure explained and consent for procedure obtained by  with spouse. Time out performed. LP performed by radiologist. Specimens collected and sent to laboratory per order. Bandage applied to lower back- report called to John. Pt transported back to [facility] via bed for recovery.      Okay to place PICC line per ID.

## 2022-11-01 NOTE — PLAN OF CARE
Patient progressing towards discharge.    Patient had episode of fever this morning. Patient remains in restraints due to confusion and agitation.  Constantly pulling at restraints when not asleep.  Patient makes no meaningful communication when awake.  Patient does not follow commands.  Patient does move all extremities.  IV fluids and antibiotics given as ordered.  Discussed POC with patient and family with verbalization of understanding.    Will continue to monitor.

## 2022-11-01 NOTE — CONSULTS
[facility]  Adult Nutrition  Consult Note    SUMMARY     Recommendations   1) Start TPN via PICC when placed: D15 AA 5 @ 70 ml/hr + standard lipids   ( provides 84 g protein ( 100% EPN), 1643 kcal ( 100% EEN))     2) If pt remains NPO and NGT placement become possible or PEG needed longterm rec TF:   Peptamen 1.5 Prebio @ 20 ml/hr advancing to goal of 45 ml/hr + 130 ml flush q 4 hr    ( provides 1620 kcal ( 100% EEN), 73 g protein ( 100% EPN),and 831 ml free water)     3) If AMS improves and able to advance diet rec. regular, texure per SLP to encourage PO intakes   4) weigh weekly, replete phos as needed    Goals: 1) Nutrition support meeting > 75% of needs at f/u  Nutrition Goal Status: new  Communication of RD Recs: reviewed with physician (POC, sticky note)    Assessment and Plan    Moderate malnutrition  Contributing Nutrition Diagnosis  Moderate chronic condition related malnutrition    Related to (etiology):   AMS    Signs and Symptoms (as evidenced by):   1) PO intakes < 75% of needs x > 1 month  2) mild wasting as charted below    Interventions:  Collaboration with other providers    Nutrition Diagnosis Status:   new           Malnutrition Assessment     Skin (Micronutrient):  (Jamin = 11, abrasions of feet, buttocks, redness of upper arm)   Micronutrient Evaluation: suspected deficiency  Micronutrient Evaluation Comments: check iron, K, Phos   Energy Intake (Malnutrition): less than 75% for greater than or equal to 1 month   Orbital Region (Subcutaneous Fat Loss): mild depletion  Upper Arm Region (Subcutaneous Fat Loss): mild depletion  Thoracic and Lumbar Region: mild depletion   [place] Region (Muscle Loss): mild depletion  Clavicle Bone Region (Muscle Loss): mild depletion  Clavicle and Acromion Bone Region (Muscle Loss): mild depletion  Scapular Bone Region (Muscle Loss): mild depletion  Dorsal Hand (Muscle Loss): mild depletion  Patellar Region (Muscle Loss): mild  "depletion  Anterior Thigh Region (Muscle Loss): mild depletion   Edema (Fluid Accumulation): 0-->no edema present   Subcutaneous Fat Loss (Final Summary): mild protein-calorie malnutrition  Muscle Loss Evaluation (Final Summary): mild protein-calorie malnutrition         Reason for Assessment    Reason For Assessment: consult  Diagnosis:  (dementia with behavioral disturbances)  Relevant Medical History: Head and neck CA, HTN, depression, dysphagia s/p PEG ( removed Nov. 2012), dementia  Interdisciplinary Rounds: did not attend    General Information Comments: 71 y/o male admitted with AMS, in restraints, confused and agitated. SLP rec. NPO, 3 days fo poor PO intakes. Decline in mental status, not alert enough to eat PO or follow commands for NG placement, also with difficulties s/p neck surgery- plan for PICC placement for TPN per MD. PTA pt was at [facility] and noted to have poor appetite, prior to this he was at home and ate well on a soft food diet. NFPE 11/1/22 mild wasting seen. No significant wt loss per chart review.    Nutrition Discharge Planning: To be determined- Cardiac, texture per SLP or TF above    Nutrition Risk Screen    Nutrition Risk Screen: dysphagia or difficulty swallowing    Nutrition/Diet History    Patient Reported Diet/Restrictions/Preferences: soft  Spiritual, Cultural Beliefs, Scientologist Practices, Values that Affect Care: no  Food Allergies: NKFA  Factors Affecting Nutritional Intake: impaired cognitive status/motor control, difficulty/impaired swallowing, inability to feed self    Anthropometrics    Temp: 100.2 °F (37.9 °C)  Height Method: Measured  Height: 5' 7"  Height (inches): 67 in  Weight Method: Bed Scale  Weight: 56.7 kg (125 lb)  Weight (lb): 125 lb  Ideal Body Weight (IBW), Male: 148 lb  % Ideal Body Weight, Male (lb): 84.46 %  BMI (Calculated): 19.6  BMI Grade: 18.5-24.9 - normal       Lab/Procedures/Meds    Pertinent Labs Reviewed: reviewed  BMP  Lab Results "   Component Value Date     11/01/2022    K 2.9 (L) 11/01/2022     (H) 11/01/2022    CO2 22 (L) 11/01/2022    BUN 14 11/01/2022    CREATININE 0.9 11/01/2022    CALCIUM 8.7 11/01/2022    ANIONGAP 10 11/01/2022    EGFRNONAA >60 11/17/2020     Lab Results   Component Value Date    ALBUMIN 2.6 (L) 11/01/2022     Lab Results   Component Value Date    CALCIUM 8.7 11/01/2022    PHOS 1.7 (L) 11/01/2022       Pertinent Medications Reviewed: reviewed  Pertinent Medications Comments: statin, senna, KPhos, scopolamine, D5 1/2 NS @ 75 ml/hr, zofran    Estimated/Assessed Needs    Weight Used For Calorie Calculations: 56.7 kg (125 lb)  Energy Calorie Requirements (kcal): MSJ ( x 1.2-1.4) = 9961-1384 kcal  Energy Need Method: [place]  Protein Requirements: 1.2-1.4 g protein/kg ( age/wasting) = 68-79 g  Weight Used For Protein Calculations: 56.7 kg (125 lb)  Fluid Requirements (mL): 6497-8928 ml or per MD  Estimated Fluid Requirement Method: RDA Method  CHO Requirement: N/A      Nutrition Prescription Ordered    Current Diet Order: NPO x 1 day  Nutrition Order Comments: was IDDSI 6    Evaluation of Received Nutrient/Fluid Intake    Energy Calories Required: not meeting needs  Protein Required: not meeting needs  Fluid Required: meeting needs  Total Fluid Intake (mL/kg): IVF @ 75 ml/hr  Tolerance: not tolerating (NPO)  % Intake of Estimated Energy Needs: 19% D 5  % Meal Intake: NPO    Nutrition Risk    Level of Risk/Frequency of Follow-up: moderate 2 x weekly      Monitor and Evaluation    Food and Nutrient Intake: energy intake  Food and Nutrient Adminstration: diet order, enteral and parenteral nutrition administration  Anthropometric Measurements: weight  Biochemical Data, Medical Tests and Procedures: electrolyte and renal panel, gastrointestinal profile, glucose/endocrine profile  Nutrition-Focused Physical Findings: overall appearance       Nutrition Follow-Up    RD Follow-up?: Yes

## 2022-11-01 NOTE — PLAN OF CARE
10/31/22 1953   Patient Assessment/Suction   Level of Consciousness (AVPU) responds to pain   PRE-TX-O2   O2 Device (Oxygen Therapy) room air   SpO2 95 %   Pulse Oximetry Type Intermittent   $ Pulse Oximetry - Multiple Charge Pulse Oximetry - Multiple   Pulse 87   Resp 20

## 2022-11-01 NOTE — PROGRESS NOTES
Ronn RIGGINS Ada Giraldo. [id number] is a 72 y.o. male who has been consulted for vancomycin dosing.    Pharmacy consult for vancomycin dosing in no longer required.  Vancomycin was discontinued.    Thank you for allowing us to participate in this patient's care.     Tiki Lopez, PharmD

## 2022-11-01 NOTE — ASSESSMENT & PLAN NOTE
Nutrition consulted. Most recent weight and BMI monitored-          Malnutrition (Moderate to Severe)  Energy Intake (Malnutrition): less than 75% for greater than or equal to 1 month    Final Summary  Subcutaneous Fat Loss (Final Summary): mild protein-calorie malnutrition  Muscle Loss Evaluation (Final Summary): mild protein-calorie malnutrition         Measurements:  Wt Readings from Last 1 Encounters:   11/01/22 56.7 kg (125 lb)   Body mass index is 19.58 kg/m².    Recommendations: Recommendation/Intervention: 1) Start TPN via PICC when placed: D15 AA 5 @ 70 ml/hr + standard lipids ( provides 84 g protein ( 100% EPN), 1643 kcal ( 100% EEN)) 2) If pt remains NPO and NGT placement become possible or PEG needed longterm rec TF: Peptamen 1.5 Prebio @ 20 ml/hr advancing to goal of 45 ml/hr + 130 ml flush q 4 hr  ( provides 1620 kcal ( 100% EEN), 73 g protein ( 100% EPN),and 831 ml free water) 3) If AMS improves and able to advance diet rec. regular, texure per SLP to encourage PO intakes 4) weigh weekly  Goals: 1) Nutrition support meeting > 75% of needs at f/u    Patient has been screened and assessed by RD. RD will follow patient.    TPN until patient is alert for oral diet

## 2022-11-01 NOTE — PT/OT/SLP PROGRESS
Speech Language Pathology      Ronn VAISHNAVI Caballero .  MRN: [id number]    (11:24-11:30) Patient not seen today secondary to unarousable to repeated verbal/tactile stimulation; mumbling. Given Ativan this AM per RN . Will follow-up AM.

## 2022-11-01 NOTE — ASSESSMENT & PLAN NOTE
CXR:  PICC placed in good position.  1.5 cm density projecting over the right mid lung feel likely represents overlapping shadows but follow-up chest x-rays are recommended to assure resolution and absence of a pulmonary lesion  CTA head and neck :  4. Right upper lobe ground-glass opacities in patchy consolidation, nonspecific and may reflect sequelae of small airways inflammation or infection.     UA reflex to culture - NTD  Blood culture- NTD  CSF - pending     Ampicillin, acyclovir, rocephin and vancomycin   Resp did deep suction - will send for culture

## 2022-11-02 ENCOUNTER — OUTSIDE PLACE OF SERVICE (OUTPATIENT)
Dept: INFECTIOUS DISEASES | Facility: CLINIC | Age: 72
End: 2022-11-02
Payer: MEDICARE

## 2022-11-02 DIAGNOSIS — R50.9 FEVER: Primary | ICD-10-CM

## 2022-11-02 LAB
ALBUMIN SERPL BCP-MCNC: 2.4 G/DL (ref 3.5–5.2)
ALLENS TEST: ABNORMAL
ALP SERPL-CCNC: 83 U/L (ref 55–135)
ALT SERPL W/O P-5'-P-CCNC: 38 U/L (ref 10–44)
ANION GAP SERPL CALC-SCNC: 9 MMOL/L (ref 8–16)
AST SERPL-CCNC: 40 U/L (ref 10–40)
BASOPHILS # BLD AUTO: 0.04 K/UL (ref 0–0.2)
BASOPHILS NFR BLD: 0.4 % (ref 0–1.9)
BILIRUB SERPL-MCNC: 0.4 MG/DL (ref 0.1–1)
BUN SERPL-MCNC: 13 MG/DL (ref 8–23)
CALCIUM SERPL-MCNC: 8.7 MG/DL (ref 8.7–10.5)
CHLORIDE SERPL-SCNC: 110 MMOL/L (ref 95–110)
CK SERPL-CCNC: 305 U/L (ref 20–200)
CO2 SERPL-SCNC: 21 MMOL/L (ref 23–29)
CREAT SERPL-MCNC: 0.8 MG/DL (ref 0.5–1.4)
DELSYS: ABNORMAL
DIFFERENTIAL METHOD: ABNORMAL
EOSINOPHIL # BLD AUTO: 0.2 K/UL (ref 0–0.5)
EOSINOPHIL NFR BLD: 2.3 % (ref 0–8)
ERYTHROCYTE [DISTWIDTH] IN BLOOD BY AUTOMATED COUNT: 12.5 % (ref 11.5–14.5)
EST. GFR  (NO RACE VARIABLE): >60 ML/MIN/1.73 M^2
GLUCOSE SERPL-MCNC: 124 MG/DL (ref 70–110)
HCO3 UR-SCNC: 22.2 MMOL/L (ref 24–28)
HCT VFR BLD AUTO: 33.7 % (ref 40–54)
HGB BLD-MCNC: 11.2 G/DL (ref 14–18)
IMM GRANULOCYTES # BLD AUTO: 0.07 K/UL (ref 0–0.04)
IMM GRANULOCYTES NFR BLD AUTO: 0.7 % (ref 0–0.5)
LACTATE SERPL-SCNC: 1 MMOL/L (ref 0.5–2.2)
LYMPHOCYTES # BLD AUTO: 0.6 K/UL (ref 1–4.8)
LYMPHOCYTES NFR BLD: 6.1 % (ref 18–48)
MAGNESIUM SERPL-MCNC: 1.9 MG/DL (ref 1.6–2.6)
MCH RBC QN AUTO: 30.4 PG (ref 27–31)
MCHC RBC AUTO-ENTMCNC: 33.2 G/DL (ref 32–36)
MCV RBC AUTO: 91 FL (ref 82–98)
MODE: ABNORMAL
MONOCYTES # BLD AUTO: 1.5 K/UL (ref 0.3–1)
MONOCYTES NFR BLD: 14.5 % (ref 4–15)
NEUTROPHILS # BLD AUTO: 7.9 K/UL (ref 1.8–7.7)
NEUTROPHILS NFR BLD: 76 % (ref 38–73)
NRBC BLD-RTO: 0 /100 WBC
OXCARBAZEPINE METABOLITE: 5 MCG/ML (ref 10–35)
OXCARBAZEPINE METABOLITE: <1 MCG/ML (ref 10–35)
PCO2 BLDA: 33 MMHG (ref 35–45)
PH SMN: 7.44 [PH] (ref 7.35–7.45)
PHOSPHATE SERPL-MCNC: 2.9 MG/DL (ref 2.7–4.5)
PLATELET # BLD AUTO: 228 K/UL (ref 150–450)
PMV BLD AUTO: 11.6 FL (ref 9.2–12.9)
PO2 BLDA: 67 MMHG (ref 80–100)
POC BE: -2 MMOL/L
POC SATURATED O2: 94 % (ref 95–100)
POC TCO2: 23 MMOL/L (ref 23–27)
POTASSIUM SERPL-SCNC: 3.7 MMOL/L (ref 3.5–5.1)
PROCALCITONIN SERPL IA-MCNC: 0.72 NG/ML
PROT SERPL-MCNC: 5.6 G/DL (ref 6–8.4)
RBC # BLD AUTO: 3.69 M/UL (ref 4.6–6.2)
SAMPLE: ABNORMAL
SITE: ABNORMAL
SODIUM SERPL-SCNC: 140 MMOL/L (ref 136–145)
WBC # BLD AUTO: 10.4 K/UL (ref 3.9–12.7)

## 2022-11-02 PROCEDURE — A4216 STERILE WATER/SALINE, 10 ML: HCPCS | Performed by: HOSPITALIST

## 2022-11-02 PROCEDURE — 94761 N-INVAS EAR/PLS OXIMETRY MLT: CPT

## 2022-11-02 PROCEDURE — 36600 WITHDRAWAL OF ARTERIAL BLOOD: CPT

## 2022-11-02 PROCEDURE — 25000003 PHARM REV CODE 250: Performed by: HOSPITALIST

## 2022-11-02 PROCEDURE — 63600175 PHARM REV CODE 636 W HCPCS: Performed by: HOSPITALIST

## 2022-11-02 PROCEDURE — 87070 CULTURE OTHR SPECIMN AEROBIC: CPT | Performed by: STUDENT IN AN ORGANIZED HEALTH CARE EDUCATION/TRAINING PROGRAM

## 2022-11-02 PROCEDURE — 99233 SBSQ HOSP IP/OBS HIGH 50: CPT | Mod: S$GLB,,, | Performed by: STUDENT IN AN ORGANIZED HEALTH CARE EDUCATION/TRAINING PROGRAM

## 2022-11-02 PROCEDURE — 82550 ASSAY OF CK (CPK): CPT | Performed by: HOSPITALIST

## 2022-11-02 PROCEDURE — 99900035 HC TECH TIME PER 15 MIN (STAT)

## 2022-11-02 PROCEDURE — 27000221 HC OXYGEN, UP TO 24 HOURS

## 2022-11-02 PROCEDURE — 25000003 PHARM REV CODE 250: Performed by: INTERNAL MEDICINE

## 2022-11-02 PROCEDURE — 12000002 HC ACUTE/MED SURGE SEMI-PRIVATE ROOM

## 2022-11-02 PROCEDURE — 83735 ASSAY OF MAGNESIUM: CPT | Performed by: HOSPITALIST

## 2022-11-02 PROCEDURE — G0425 PR INPT TELEHEALTH CONSULT 30M: ICD-10-PCS | Mod: 95,,, | Performed by: PSYCHIATRY & NEUROLOGY

## 2022-11-02 PROCEDURE — 36415 COLL VENOUS BLD VENIPUNCTURE: CPT | Performed by: HOSPITALIST

## 2022-11-02 PROCEDURE — 83605 ASSAY OF LACTIC ACID: CPT | Performed by: HOSPITALIST

## 2022-11-02 PROCEDURE — 63600175 PHARM REV CODE 636 W HCPCS: Performed by: INTERNAL MEDICINE

## 2022-11-02 PROCEDURE — A4217 STERILE WATER/SALINE, 500 ML: HCPCS | Performed by: INTERNAL MEDICINE

## 2022-11-02 PROCEDURE — 80053 COMPREHEN METABOLIC PANEL: CPT | Performed by: HOSPITALIST

## 2022-11-02 PROCEDURE — 85025 COMPLETE CBC W/AUTO DIFF WBC: CPT | Performed by: HOSPITALIST

## 2022-11-02 PROCEDURE — 84100 ASSAY OF PHOSPHORUS: CPT | Performed by: HOSPITALIST

## 2022-11-02 PROCEDURE — 84145 PROCALCITONIN (PCT): CPT | Performed by: HOSPITALIST

## 2022-11-02 PROCEDURE — 63600175 PHARM REV CODE 636 W HCPCS: Performed by: STUDENT IN AN ORGANIZED HEALTH CARE EDUCATION/TRAINING PROGRAM

## 2022-11-02 PROCEDURE — 99233 PR SUBSEQUENT HOSPITAL CARE,LEVL III: ICD-10-PCS | Mod: S$GLB,,, | Performed by: STUDENT IN AN ORGANIZED HEALTH CARE EDUCATION/TRAINING PROGRAM

## 2022-11-02 PROCEDURE — 31720 CLEARANCE OF AIRWAYS: CPT

## 2022-11-02 PROCEDURE — 25000003 PHARM REV CODE 250: Performed by: STUDENT IN AN ORGANIZED HEALTH CARE EDUCATION/TRAINING PROGRAM

## 2022-11-02 PROCEDURE — G0425 INPT/ED TELECONSULT30: HCPCS | Mod: 95,,, | Performed by: PSYCHIATRY & NEUROLOGY

## 2022-11-02 PROCEDURE — 82803 BLOOD GASES ANY COMBINATION: CPT

## 2022-11-02 RX ORDER — LORAZEPAM 2 MG/ML
1 INJECTION INTRAMUSCULAR NIGHTLY
Status: DISCONTINUED | OUTPATIENT
Start: 2022-11-02 | End: 2022-11-05

## 2022-11-02 RX ORDER — SODIUM CHLORIDE, SODIUM LACTATE, POTASSIUM CHLORIDE, CALCIUM CHLORIDE 600; 310; 30; 20 MG/100ML; MG/100ML; MG/100ML; MG/100ML
INJECTION, SOLUTION INTRAVENOUS CONTINUOUS
Status: DISCONTINUED | OUTPATIENT
Start: 2022-11-02 | End: 2022-11-11 | Stop reason: HOSPADM

## 2022-11-02 RX ORDER — SODIUM CHLORIDE 450 MG/100ML
INJECTION, SOLUTION INTRAVENOUS CONTINUOUS
Status: DISCONTINUED | OUTPATIENT
Start: 2022-11-02 | End: 2022-11-02

## 2022-11-02 RX ADMIN — DANTROLENE SODIUM 55 MG: 20 INJECTION INTRAVENOUS at 07:11

## 2022-11-02 RX ADMIN — DEXTROSE 250 MG: 50 INJECTION, SOLUTION INTRAVENOUS at 04:11

## 2022-11-02 RX ADMIN — LEVOTHYROXINE SODIUM 44 MCG: 20 INJECTION, SOLUTION INTRAVENOUS at 09:11

## 2022-11-02 RX ADMIN — SODIUM CHLORIDE: 0.45 INJECTION, SOLUTION INTRAVENOUS at 09:11

## 2022-11-02 RX ADMIN — LORAZEPAM 1 MG: 2 INJECTION INTRAMUSCULAR; INTRAVENOUS at 10:11

## 2022-11-02 RX ADMIN — SODIUM CHLORIDE, PRESERVATIVE FREE 10 ML: 5 INJECTION INTRAVENOUS at 05:11

## 2022-11-02 RX ADMIN — DANTROLENE SODIUM 55 MG: 20 INJECTION INTRAVENOUS at 05:11

## 2022-11-02 RX ADMIN — ACETAMINOPHEN 650 MG: 650 SUPPOSITORY RECTAL at 04:11

## 2022-11-02 RX ADMIN — LORAZEPAM 1 MG: 2 INJECTION INTRAMUSCULAR; INTRAVENOUS at 05:11

## 2022-11-02 RX ADMIN — DOXYCYCLINE 100 MG: 100 INJECTION, POWDER, LYOPHILIZED, FOR SOLUTION INTRAVENOUS at 09:11

## 2022-11-02 RX ADMIN — SODIUM CHLORIDE, SODIUM LACTATE, POTASSIUM CHLORIDE, AND CALCIUM CHLORIDE: .6; .31; .03; .02 INJECTION, SOLUTION INTRAVENOUS at 10:11

## 2022-11-02 RX ADMIN — AMPICILLIN SODIUM AND SULBACTAM SODIUM 3 G: 2; 1 INJECTION, POWDER, FOR SOLUTION INTRAMUSCULAR; INTRAVENOUS at 10:11

## 2022-11-02 RX ADMIN — SODIUM CHLORIDE, PRESERVATIVE FREE 10 ML: 5 INJECTION INTRAVENOUS at 06:11

## 2022-11-02 RX ADMIN — SODIUM CHLORIDE, PRESERVATIVE FREE 10 ML: 5 INJECTION INTRAVENOUS at 12:11

## 2022-11-02 RX ADMIN — ENOXAPARIN SODIUM 40 MG: 100 INJECTION SUBCUTANEOUS at 04:11

## 2022-11-02 RX ADMIN — AMPICILLIN SODIUM AND SULBACTAM SODIUM 3 G: 2; 1 INJECTION, POWDER, FOR SOLUTION INTRAMUSCULAR; INTRAVENOUS at 03:11

## 2022-11-02 RX ADMIN — DANTROLENE SODIUM 55 MG: 20 INJECTION INTRAVENOUS at 01:11

## 2022-11-02 NOTE — PROGRESS NOTES
Progress Note  Infectious Disease    Reason for Consult:  AMS + Fever rule out meningitis     HPI: Ronn Caballero Jr. is a 72 y.o. male with past medical history of thyroid cancer, frontal temporal dementia, hypertension, hyperlipidemia, depression/anxiety who was transferred from [facility] due to 6 days of failure to thrive.  History obtain from wife at bedside.  She states patient started acting aggressive 2 weeks ago, she had to call the ambulance and patient was placed at [facility].  Of note patient was taking Zyprexa 2.5 mg daily and dose was increased 10 mg twice a day.  He was also started on gabapentin 3 times a day.  Wife states she was not able to communicate with the facility for about a week until she called her daughter for assistance and was advised to bring the patient to the emergency room.  As per records, patient received Haldol x4, Benadryl, and Ativan x2.  Reports of not eating, not drinking noted.  No personal history of seizures.  She states patient has shuffled gait at baseline, last time she saw him at home he did not have cough, shortness of breath, nausea or vomiting, or any complaint before being transferred to psychiatric facility.    In the ER, patient hypertensive, afebrile   Labs on admission, leukocytosis 17.4, left shift 85%, bands 2%, H&H 13.7/41.9, platelet count 274   Hypernatremia 153  Creatinine 1.2   /ALT 42   CPK 3221   Lactic acid 1.2   Procalcitonin 0.3  UA with no pyuria, occasional bacteria, negative for UTI   Blood cultures x2 no growth to date, pending final   Chest x-ray clear   CT head with calcified mass in the floor of the right middle cranial fossa, most suggestive of petrous apex meningioma.  Incidental finding.  Essential negative study, aside from atrophy.    CT abdomen/pelvis limited by motion, cyst on left kidney.  Constipation.  Nonobstructive renal stone.    Hospital course complicated by fever of  101 10/30 and 102.5 today.   ID consult to rule out meningitis. Empirically started on Vancomycin and Ceftriaxone IV.     INTERVAL HISTORY:  11/1: Interim reviewed, patient seen and examined at beside, remains somnolent, not following commands, gargling. Hypertensive, Tmax 102.3 overnight, currently afebrile. Discussed with neurology, will try empiric dantrolene, ordered MRI brain and attempt LP. Labs reviewed, WBC 9.8, left shift 75.4%, creatinie stable. Phsophorus 1.7, AST 56 trending down, ALT normal. Micro reviewed, no growth so far.     11/2:  Interim reviewed, patient seen examined at bedside.  Remains somnolent, not following commands.  Hypertensive, T-max 101.4° yesterday, currently afebrile.  Had LP performed yesterday, WBC 4, normal glucose and protein, negative for meningitis.  Discussed with respiratory therapy, copious amount of purulent secretions suctioned, specimen sent for culture.  Labs reviewed, white count 10.4, left shift 76%, H&H 11.2/33.7, platelet count 228.  Stable kidney function, albumin 2.4, normal LFTs.  CPK trending down 06/03/2005, lactic acid 1, procalcitonin 0.7, suspecting ongoing aspiration pneumonia.    Review of patient's allergies indicates:  No Known Allergies  Past Medical History:   Diagnosis Date    Cancer     Dementia     Depression     Hypertension      Past Surgical History:   Procedure Laterality Date    Lymph node Ca removed       Social History     Tobacco Use    Smoking status: Not on file    Smokeless tobacco: Not on file   Substance Use Topics    Alcohol use: Not on file        No family history on file.    Pertinent medications noted: Zyprexa/haldol/Ativan    Review of Systems: Patient somnolent, unable to follow commands.     Outdoor activities: From home, recently admitted to [facility] for abnormal behavior in the setting of frontotemporal dementia.  Travel: None  Implants: None  Antibiotic History: Vanco/Ceftriaxone    EXAM & DIAGNOSTICS REVIEWED:   Vitals:     Temp:  [99 °F (37.2 °C)-101.4 °F  (38.6 °C)]   Temp: (!) 101.4 °F (38.6 °C) (11/02/22 0546)  Pulse: 68 (11/02/22 0728)  Resp: (!) 24 (11/02/22 0728)  BP: (!) 164/80 (11/02/22 0411)  SpO2: (!) 90 % (11/02/22 0728)    Intake/Output Summary (Last 24 hours) at 11/2/2022 0908  Last data filed at 11/2/2022 0349  Gross per 24 hour   Intake 10 ml   Output 2150 ml   Net -2140 ml       General:  Flushing resolved, somnolent, not following commands, on O2 by Nc 2L  Eyes:  Eyes shut, unable to open  ENT:  Dry thick oral secretions on tongue and soft palate, no thrush  Neck:  Less neck rigidity   Lungs: Coarse breath sounds b/l  Heart:  S1/S2+, regular rhythm, no murmurs  Abd:  +BS, soft, non tender, non distended, no rebound  :  Voids  Musc:  Joints without effusion, swelling,  erythema, synovitis  Skin:  Warm, R arm with resolving redness on AC fossa, seems like prior infiltrated IV, scab noted, see pic below   Wound:   Neuro:  Somnolent, not following commands, upper extremities no longer contracted, difficult to assess muscle tone  Psych:    Lymphatic:       Extrem: No LE edema b/l  VAD:  Peripheral IV       Isolation: None    11/1:        10/31:        General Labs reviewed:  Recent Labs   Lab 10/31/22  0440 11/01/22  0447 11/02/22  0453   WBC 7.73 9.89 10.40   HGB 11.5* 11.3* 11.2*   HCT 34.4* 33.7* 33.7*    224 228       Recent Labs   Lab 10/31/22  0440 11/01/22 0447 11/02/22  0452   * 144 140   K 3.2* 2.9* 3.7   * 112* 110   CO2 22* 22* 21*   BUN 19 14 13   CREATININE 0.8 0.9 0.8   CALCIUM 9.1 8.7 8.7   PROT 5.5* 5.6* 5.6*   BILITOT 0.7 0.7 0.4   ALKPHOS 85 79 83   ALT 48* 42 38   AST 89* 56* 40     No results for input(s): CRP in the last 168 hours.  No results for input(s): SEDRATE in the last 168 hours.    Estimated Creatinine Clearance: 68.9 mL/min (based on SCr of 0.8 mg/dL).     Micro:  Microbiology Results (last 7 days)       Procedure Component Value Units Date/Time    Blood culture (site 2) [[id number]] Collected: 10/29/22  1543    Order Status: Completed Specimen: Blood Updated: 11/02/22 0612     Blood Culture, Routine No Growth to date      No Growth to date      No Growth to date      No Growth to date    Narrative:      Site # 2, aerobic only    Blood culture (site 1) [[id number]] Collected: 10/29/22 1542    Order Status: Completed Specimen: Blood Updated: 11/02/22 0612     Blood Culture, Routine No Growth to date      No Growth to date      No Growth to date      No Growth to date    Narrative:      Site # 1, aerobic and anaerobic    Culture, Respiratory with Gram Stain [[id number]] Collected: 11/01/22 1744    Order Status: Completed Specimen: Respiratory from Tracheal Aspirate Updated: 11/02/22 0211     Gram Stain (Respiratory) Many WBC's     Gram Stain (Respiratory) Few Gram positive cocci     Gram Stain (Respiratory) Few Gram negative rods     Gram Stain (Respiratory) Rare Gram positive rods     Gram Stain (Respiratory) Rare budding yeast    CSF culture [[id number]] Collected: 11/01/22 1513    Order Status: Completed Specimen: CSF (Spinal Fluid) from CSF Tap, Tube 2 Updated: 11/01/22 1712     Gram Stain Result No WBC's, epithelial cells or organisms seen      11/01/2022  17:11 TN3    Gram stain [[id number]] Collected: 11/01/22 1513    Order Status: Canceled Specimen: CSF (Spinal Fluid) from CSF Tap, Tube 2             Imaging Reviewed:  CXR  CT head   CT head/neck  CT abdomen/pelvis     MRI Brain:  1. Moderate to marked patient motion slightly limits evaluation but there is no acute infarction.  There is no acute intracranial hemorrhage or parenchymal mass.  There is generalized volume loss.  There is also mild nonspecific white matter change.   2. There is calcification in the posterolateral aspect of the right middle cranial fossa which is better demonstrated on comparison CT.  There is no definite associated enhancement as would be expected in a meningioma.  This could potentially represent very prominent incidental dural  calcification or possible osteoma.  This is not acute.     Cardiology: EKG Qtc 496ms       IMPRESSION & PLAN     AMS + Fever, rule out meningitis vs NMS vs Serotoninergic syndrome in the setting of recent increased dose of Zyprexa and other neuroleptics, favoring the latter  Blood cultures x 2 no growth   LP negative for meningitis     2. Aspiration pneumonia  Respiratory culture multiple organisms on G stain, pending final    Procal 0.7    3. R arm mild cellulitis, improving  4. Rhabdomyolysis, improving   5. Transaminitis, trending down  6. PMHx: thyroid cancer, frontal temporal dementia, hypertension, hyperlipidemia, depression/anxiety     Recommendations:  Stop Vancomycin, Acyclovir, Ceftriaxone and ampicillin IV  CXR ordered  Follow respiratory culture 11/1  Start Unasyn 3g IV q6h for possible aspiration pneumonia  Neurology recommendations appreciated   Aspiration precautions  Oral hygiene  Incentive spirometry  Wound care to R arm  Pain & Palliative to discuss goals of care    Will follow     D/w Wife, nursing, Dr Gibbs     Medical Decision Making during this encounter was  [_] Low Complexity  [_] Moderate Complexity  [xx] High Complexity

## 2022-11-02 NOTE — PT/OT/SLP PROGRESS
Speech Language Pathology      Ronn VAISHNAVI Caballero .  MRN: [id number]    Patient not seen today secondary to unarousable to repeated verbal/tactile stimulation; mumbling. Does not open eyes. Pt not appropriate for clinical swallow evaluation x3 days. Please re consult once mental status improves.

## 2022-11-02 NOTE — PROGRESS NOTES
[facility]  Progress Note  Date: 2022 9:36 AM            Patient Name: Ronn Caballero Jr.   MRN: [id number]   : 1950    AGE: 72 y.o.    LOS: 4 days Hospital Day: 5  Admit date: 10/29/2022 10:53 AM         HPI per EMR: Ronn Caballero Jr. is a 71 yo male with h/o thyroid cancer, frontal temporal lobe dementia, HTN, HLP, depression and anxiety transferred from [facility] due to 6 days of failure to thrive. It is reported from the facility that he was PEC/CEC (expires 11/3/22) due to combative behavior at home. History comes from the patient's spouse and sister-in -law at bedside. Patient follows a neurologist in [place] and spouse reports recent change in medications include increase of zyprexa from 2.5mg to 10mg BID. He was also started on gabapentin TID. Spouse denies recent fever, chills, cough, D/N/V.  Per the MAR from [place] he received Haldol x4 IM, benadryl and ativan IV x2. The facility reports he was not eating or drinking and no witnessed episodes of diarrhea or vomiting. On our workup he has Na153, elevated  AST, hematuria, WBC 17k with 2% bands, lactic acid 1.2. No source of infection on UA or chest xray. On exam he does show discomfort in RUQ and midepigastrium. Abd CT and lipase pending. Troponin mildly elevated 0.056 with some EKG changes.      Hospital medicine consulted for further medical management. Physical restraints will be used to protect patient as he is pulling at cardiac leads and peripheral IV. Neurology consulted. Needs tele sitter.     10/31/2022:  Patient was seen and examined by me this morning.  He is encephalopathic and only able to tell me his name on repeated questioning.  Patient's wife was at bedside and states that he has a history of frontotemporal dementia however last  he suddenly became altered and combative for which he was taken to the hospital and then to [facility].    His CK  "has been elevated and he has been encephalopathic and febrile.     11/01/2022:  Patient was seen examined by me this morning.  He remains to be somnolent and exam unchanged.    11/02/2022: Patient was seen and examined by me. He is somnolent and does open eyes to stimulus and does not follow commands or answer questions.  Patient's wife is at bedside and I discussed plan of care.  Patient has been febrile overnight.  Lumbar puncture was done yesterday and showed WBC 4, protein 32, glucose 62         Vitals:  Patient Vitals for the past 24 hrs:   BP Temp Temp src Pulse Resp SpO2 Height Weight   11/02/22 1126 (!) 167/72 99.2 °F (37.3 °C) Oral 65 17 (!) 94 % -- --   11/02/22 0922 (!) 170/81 99.3 °F (37.4 °C) Oral (!) 59 17 (!) 93 % -- --   11/02/22 0728 -- -- -- 68 (!) 24 (!) 90 % -- --   11/02/22 0546 -- (!) 101.4 °F (38.6 °C) -- -- -- -- -- --   11/02/22 0456 -- (!) 101.2 °F (38.4 °C) -- -- -- -- -- --   11/02/22 0424 -- (!) 101.3 °F (38.5 °C) -- -- -- -- -- --   11/02/22 0411 (!) 164/80 (!) 101.3 °F (38.5 °C) -- 71 20 (!) 93 % -- --   11/02/22 0410 -- (!) 101.3 °F (38.5 °C) -- -- -- -- -- --   11/02/22 0005 -- -- -- 78 18 (!) 93 % -- --   11/01/22 2318 (!) 173/78 100.2 °F (37.9 °C) Tympanic 82 18 (!) 93 % -- --   11/01/22 2008 (!) 179/83 99.7 °F (37.6 °C) Axillary 64 17 98 % -- --   11/01/22 1936 -- -- -- 60 18 98 % -- --   11/01/22 1734 130/68 100.2 °F (37.9 °C) -- 68 16 96 % -- --   11/01/22 1402 -- -- -- -- -- 95 % -- --   11/01/22 1235 -- -- -- -- -- -- 5' 7" (1.702 m) 56.7 kg (125 lb)     PHYSICAL EXAM:     GENERAL APPEARANCE:  Patient is somnolent, does not wake up to stimulus and does not answer to questions or follow commands.    HEENT: Normocephalic and atraumatic. PERRL. Oropharynx unremarkable.  PULM: Comfortable on room air.  CV: RRR.  ABDOMEN: Soft, nontender.  EXTREMITIES: No signs of vascular compromise. Pulses present. No cyanosis, clubbing or edema.  SKIN: Clear; no rashes, lesions or skin breaks in " exposed areas.      NEURO:   MENTAL STATUS: Patient is somnolent, does not wake up to stimulus and does not answer to questions or follow commands.    CRANIAL NERVES II-XII:  Face is grossly symmetric.  Unable to assess any other cranial nerves due to his mental status  MOTOR: Normal bulk.  Tone is normal in upper and lower extremities bilaterally.    REFLEXES: DTRs 2+; normal and symmetric throughout.   SENSATION: Sensation cannot be tested  COORDINATION:  Cannot be tested  STATION: [place] deferred.  GAIT: Deferred.    CURRENT SCHEDULED MEDICATIONS:   ampicillin-sulbactim (UNASYN) IVPB  3 g Intravenous Q6H    bromocriptine  2.5 mg Oral Daily    dantrolene (DANTRIUM) IVPB  1 mg/kg Intravenous Q6H    enoxaparin  40 mg Subcutaneous Daily    levothyroxine  44 mcg Intravenous Daily    lorazepam  1 mg Intravenous Q8H    scopolamine  1 patch Transdermal Q3 Days    senna-docusate 8.6-50 mg  1 tablet Oral BID    sodium chloride 0.9%  10 mL Intravenous Q6H     CURRENT INFUSIONS:   Amino acid 5% - dextrose 15% (CLINIMIX-E) solution with additives.  CENTRAL LINE ONLY (1395 mOsm/L) 70 mL/hr at 11/01/22 2259    lactated ringers 125 mL/hr at 11/02/22 1051     DATA:  Recent Labs   Lab 10/29/22  1121 10/30/22  0428 10/31/22  0440 11/01/22  0447 11/02/22  0452   * 150* 149* 144 140   K 4.3 3.3* 3.2* 2.9* 3.7   * 120* 120* 112* 110   CO2 24 22* 22* 22* 21*   BUN 42* 30* 19 14 13   CREATININE 1.2 0.9 0.8 0.9 0.8    112* 105 121* 124*   CALCIUM 10.7* 9.0 9.1 8.7 8.7   PHOS  --  2.0* 2.5* 1.7* 2.9   MG  --  2.1 2.0 1.8 1.9   * 108* 89* 56* 40   ALT 42 44 48* 42 38     Recent Labs   Lab 10/29/22  1121 10/30/22  0428 10/31/22  0440 11/01/22  0447 11/02/22  0453   WBC 17.42* 9.24 7.73 9.89 10.40   HGB 13.7* 11.0* 11.5* 11.3* 11.2*   HCT 41.9 33.4* 34.4* 33.7* 33.7*    217 221 224 228     Lab Results   Component Value Date    PROTEINCSF 32 11/01/2022    GLUCCSF 62 11/01/2022     Hemoglobin A1c   Date Value  Ref Range Status   05/09/2018 5.6 4.2 - 6.3 % Final            I have personally reviewed and interpreted the pertinent imaging and lab results.  Imaging Results               CT Abdomen Pelvis  Without Contrast (Final result)  Result time 10/29/22 16:26:14      Final result by Luana Horner MD (10/29/22 16:26:14)                   Impression:      Study limited by motion, demonstrating a complicated cyst or solid mass extending from the lower pole of the left kidney.  This should be worked up further with a study with IV contrast when the patient is able to remain still.    Nonobstructing right renal calculus.    Constipation.    This report was flagged in Epic as abnormal.      Electronically signed by: Luana Horner  Date:    10/29/2022  Time:    16:26               Narrative:    EXAMINATION:  CT ABDOMEN PELVIS WITHOUT CONTRAST    CLINICAL HISTORY:  Abdominal abscess/infection suspected; failure to thrive    TECHNIQUE:  Low dose axial images, sagittal and coronal reformations were obtained from the lung bases to the pubic symphysis.  Neither oral nor IV contrast was administered.  The patient had a difficult time remaining still for image acquisition    COMPARISON:  None    FINDINGS:  Consolidation in the dependent portions of the lung bases is likely due to atelectasis.    Limited noncontrast images of the liver, gallbladder, spleen, adrenal glands, pancreas are grossly normal.  There is a nonobstructing 5 mm right lower pole renal calculus.  There is a 3.2 x 3.5 cm structure with a density of 46 Hounsfield units extending from the lower pole of the left kidney series 4, image 99.  This may be a complex cyst or solid mass.  No adenopathy is noted.    The aorta is normal in caliber.    There is generalized fecal stasis throughout the colon.  No evidence of a bowel obstruction.  Urinary bladder unremarkable.  There is no free intraperitoneal fluid or air.  No acute findings in the bones.                                        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  This is likely an incidental finding.  It produces no mass effect/edema on the adjacent brain.  Otherwise, essentially negative study, aside from atrophy.      Electronically signed by: Luana Horner  Date:    10/29/2022  Time:    12:01               Narrative:    EXAMINATION:  CT HEAD WITHOUT CONTRAST    CLINICAL HISTORY:  Mental status change, unknown cause;    TECHNIQUE:  Low dose axial images were obtained through the head.  Coronal and sagittal reformations were also performed. Contrast was not administered.    COMPARISON:  None    FINDINGS:  The study is mildly limited by motion.  There is no intra or extra-axial hemorrhage.  No mass effect, edema or midline shift is present.  Mild generalized atrophy, particularly central atrophy is noted.  There is no skull fracture.  The visualized paranasal sinuses are clear.    There is 1.6 cm calcified mass in the floor of the right anterior cranial fossa, extending from the petrous temporal bone.  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  There is no edema in the adjacent temporal lobe.                                       X-Ray Chest AP Portable (Final result)  Result time 10/29/22 11:35:49      Final result by Luana Horner MD (10/29/22 11:35:49)                   Impression:      No acute cardiopulmonary disease.      Electronically signed by: Luana Horner  Date:    10/29/2022  Time:    11:35               Narrative:    EXAMINATION:  XR CHEST AP PORTABLE    CLINICAL HISTORY:  Altered mental status, unspecified    TECHNIQUE:  Single frontal view of the chest was performed.    COMPARISON:  05/28/2018    FINDINGS:  The patient is mildly  rotated to the right.  The heart size is normal.  The aorta is ectatic.  The lungs are clear.  There is no pleural effusion or acute bony abnormality.                                              ASSESSMENT AND PLAN:    Encephalopathy    Etiology of encephalopathy could be multifactorial.  Patient has been febrile and there is concern for infectious encephalopathy versus worsening dementia versus polypharmacy. Also in the differential is NMS vs Serotonin syndrome(less likely with no significant hyperreflexia) as CK has been elevated(tone was not able to be assessed) and been febrile.  Continue IV fluids and scheduled ativan  Patient is on Trileptal at home which is likely for mood.  Patient does not have any history of seizures as per his wife.  If cannot take p.o. medication, recommend switching it to valproic acid 250 mg t.i.d. for mood  Recommend psychiatry consult  CSF results WBC 4, protein 32, glucose 62.  Concern for intracranial infection is low.    Continue Dantrolene, ativan, Bromocriptime(if can take PO) for possible NMS.   Continue IV fluids and trend CK  Hold Zyprexa and Fluoxetine. Appreciate psych recs  ID on board. Concern for aspiration PNA and being treated with antibiotics.  Currently on Unasyn  DVT prophylaxis  Will follow          42 minutes of care time has been spent evaluating with the patient. Time includes chart review not limited to diagnostic imaging, labs, and vitals, patient assessment, discussion with family and nursing, current order evaluations, and new order entries.      Lobito Calle MD  Neurology/vascular Neurology  Date of Service: 11/02/2022  9:36 AM    Please note: This note was transcribed using voice recognition software. Because of this technology there are often uinintended grammatical, spelling, and other transcription errors. Please disregard these errors.

## 2022-11-02 NOTE — PROGRESS NOTES
[facility] Medicine  Progress Note    Patient Name: Ronn Caballero Jr.  MRN: [id number]  Patient Class: IP- Inpatient   Admission Date: 10/29/2022  Length of Stay: 4 days  Attending Physician: Chelo Gibbs MD  Primary Care Provider: JELLY Decker        Subjective:     Principal Problem:Dementia with behavioral disturbance        HPI:  73 yo male with h/o thyroid cancer, frontal temporal lobe dementia, HTN, HLP, depression and anxiety transferred from [facility] due to 6 days of failure to thrive. It is reported from the facility that he was PEC/CEC (expires 11/3/22) due to combative behavior at home. History comes from the patient's spouse and sister-in -law at bedside. Patient follows a neurologist in [place] and spouse reports recent change in medications include increase of zyprexa from 2.5mg to 10mg BID. He was also started on gabapentin TID. Spouse denies recent fever, chills, cough, D/N/V.  Per the MAR from [facility] he received Haldol x4 IM, benadryl and ativan IV x2. The facility reports he was not eating or drinking and no witnessed episodes of diarrhea or vomiting. On our workup he has Na153, elevated  AST, hematuria, WBC 17k with 2% bands, lactic acid 1.2. No source of infection on UA or chest xray. On exam he does show discomfort in RUQ and midepigastrium. Abd CT and lipase pending. Troponin mildly elevated 0.056 with some EKG changes.     Hospital medicine consulted for further medical management. Physical restraints will be used to protect patient as he is pulling at cardiac leads and peripheral IV. Neurology consulted. Needs tele sitter.       Overview/Hospital Course:  Patient admitted from [facility] facility with failure to thrive and progressive psychosis. He has h/o frontal temporal dementia. Overnight he was given IV ativan and has no improvement in behavior. Patient unable to participate in a telepsych visit.  Neurology was consulted on guidance to medications. Appears he was given a combination of meds in addition to his home medication and possibly polypharmacy resulted in psychosis. He does require restraints as he is a harm to himself. Will try to resume home medications if he can follow commands to swallow pills. Electrolyte replacement by IV. IVF for hypernatremia and elevated CP >3000. Monitoring on tele due to QT prolongation. The spouse and daughter at bedside and agree to palliative care discussion in regards to goals of care and advanced care planning.     Fevers persistent - started on amp, rocephin, vanc and acyclovir. LP completed and studies pending. Brain MRI: poor study due to motion artifact, generalized volume loss. Zyprexa dc'd, concern for NMS. Ativan IV Ordered. Restraints remain in place. Picc line placed to RUE. TPN ordered for nutrition. Unable to take oral meds and diet due to mental status. CK trending down 1700. Na 144.     Meningitis antibiotics dc'd, ativan weaning, TPN infusing, CXR did not show infiltrates, continues to spike fevers.  Psychiatry consult placed, though not able to do if patient cannot communicate on camera. Resp culture pending, unasyn IV should cover. Continuing dantrolene q6 hours. With drop in O2 sats on RA, place on supplemental O2. CK trending down. LA normal.       Interval History: pt resting comfortably, not contracted; nasal trumpet in place and picc line to LUE    Review of Systems   Unable to perform ROS: Psychiatric disorder   Objective:     Vital Signs (Most Recent):  Temp: 99.2 °F (37.3 °C) (11/02/22 1126)  Pulse: 65 (11/02/22 1126)  Resp: 17 (11/02/22 1126)  BP: (!) 167/72 (11/02/22 1126)  SpO2: (!) 94 % (11/02/22 1126) Vital Signs (24h Range):  Temp:  [99.2 °F (37.3 °C)-101.4 °F (38.6 °C)] 99.2 °F (37.3 °C)  Pulse:  [59-82] 65  Resp:  [16-24] 17  SpO2:  [90 %-98 %] 94 %  BP: (130-179)/(68-83) 167/72     Weight: 56.7 kg (125 lb)  Body mass index is 19.58  kg/m².    Intake/Output Summary (Last 24 hours) at 11/2/2022 1355  Last data filed at 11/2/2022 0917  Gross per 24 hour   Intake 10 ml   Output 2000 ml   Net -1990 ml        Physical Exam  Constitutional:       General: He is in acute distress.      Comments: Restless in restraints    Cardiovascular:      Rate and Rhythm: Normal rate and regular rhythm.      Heart sounds: No murmur heard.  Pulmonary:      Effort: Pulmonary effort is normal.      Breath sounds: Normal breath sounds. No wheezing.   Abdominal:      General: There is no distension.      Palpations: There is no mass.   Musculoskeletal:         General: Normal range of motion.   Skin:     General: Skin is warm and dry.   Neurological:      Mental Status: He is disoriented.      Comments: Not able to follow commands due to agitation    Psychiatric:      Comments: Yelling, screaming, cussing        Significant Labs: All pertinent labs within the past 24 hours have been reviewed.  Blood Culture:   No results for input(s): LABBLOO in the last 48 hours.    CBC:   Recent Labs   Lab 11/01/22 0447 11/02/22  0453   WBC 9.89 10.40   HGB 11.3* 11.2*   HCT 33.7* 33.7*    228       CMP:   Recent Labs   Lab 11/01/22 0447 11/02/22  0452    140   K 2.9* 3.7   * 110   CO2 22* 21*   * 124*   BUN 14 13   CREATININE 0.9 0.8   CALCIUM 8.7 8.7   PROT 5.6* 5.6*   ALBUMIN 2.6* 2.4*   BILITOT 0.7 0.4   ALKPHOS 79 83   AST 56* 40   ALT 42 38   ANIONGAP 10 9       Cardiac Markers: No results for input(s): CKMB, MYOGLOBIN, BNP, TROPISTAT in the last 48 hours.  Lactic Acid:   Recent Labs   Lab 11/02/22  0800   LACTATE 1.0       Lipase:   No results for input(s): LIPASE in the last 48 hours.    Lipid Panel: No results for input(s): CHOL, HDL, LDLCALC, TRIG, CHOLHDL in the last 48 hours.  Magnesium:   Recent Labs   Lab 11/01/22 0447 11/02/22  0452   MG 1.8 1.9       POCT Glucose: No results for input(s): POCTGLUCOSE in the last 48 hours.  Troponin:   No  results for input(s): TROPONINI, TROPONINIHS in the last 48 hours.    TSH:   Recent Labs   Lab 10/23/22  1415   TSH 0.976       Urine Studies:   Recent Labs   Lab 10/31/22  1610   COLORU Yellow   APPEARANCEUA Clear   PHUR 6.0   SPECGRAV 1.010   PROTEINUA Negative   GLUCUA Negative   KETONESU Negative   BILIRUBINUA Negative   OCCULTUA Trace*   NITRITE Negative   UROBILINOGEN Negative   LEUKOCYTESUR Negative         Significant Imaging: I have reviewed all pertinent imaging results/findings within the past 24 hours.      Assessment/Plan:      * Dementia with behavioral disturbance  Sharp change in status. Possibly infectious or metabolic   CT of abdomen and pelvis - left kidney mass vs complex cyst, non obstructing stone   IVF   Neurology consulted - patient unable to do tele psych visit at this time   Psychiatry consult placed - patient cannot participate in current state - possible medication recommendation for FTD as to cause NMS in future   [facility]   Neurology recs: - resume home meds- if able to follow commands and swallow properly   Restraints if danger to himself  Tele monitoring     Head CT and brain MRi reviewed      Moderate malnutrition  Nutrition consulted. Most recent weight and BMI monitored-          Malnutrition (Moderate to Severe)  Energy Intake (Malnutrition): less than 75% for greater than or equal to 1 month    Final Summary  Subcutaneous Fat Loss (Final Summary): mild protein-calorie malnutrition  Muscle Loss Evaluation (Final Summary): mild protein-calorie malnutrition         Measurements:  Wt Readings from Last 1 Encounters:   11/01/22 56.7 kg (125 lb)   Body mass index is 19.58 kg/m².    Recommendations: Recommendation/Intervention: 1) Start TPN via PICC when placed: D15 AA 5 @ 70 ml/hr + standard lipids ( provides 84 g protein ( 100% EPN), 1643 kcal ( 100% EEN)) 2) If pt remains NPO and NGT placement become possible or PEG needed longterm rec TF: Peptamen 1.5 Prebio  @ 20 ml/hr advancing to goal of 45 ml/hr + 130 ml flush q 4 hr  ( provides 1620 kcal ( 100% EEN), 73 g protein ( 100% EPN),and 831 ml free water) 3) If AMS improves and able to advance diet rec. regular, texure per SLP to encourage PO intakes 4) weigh weekly  Goals: 1) Nutrition support meeting > 75% of needs at f/u    Patient has been screened and assessed by RD. RD will follow patient.    TPN until patient is alert for oral diet       Goals of care, counseling/discussion  Spouse decided on DNR   Appreciate palliative care meeting with family      Fever  CXR:  PICC placed in good position.  1.5 cm density projecting over the right mid lung feel likely represents overlapping shadows but follow-up chest x-rays are recommended to assure resolution and absence of a pulmonary lesion  CTA head and neck :  4. Right upper lobe ground-glass opacities in patchy consolidation, nonspecific and may reflect sequelae of small airways inflammation or infection.     UA reflex to culture - NTD  Blood culture- NTD  CSF - negative      Ampicillin, acyclovir, rocephin and vancomycin - dc'd  Resp did deep suction - will send for culture - pending and covered with Unasyn       Bandemia  Leukocytosis - unknown etiology  LA 1.2 and no fever  UA - no signs of infections  CXR - clear   CT of abdomen  Trend CBC - WBC normal range and no bandemia on repeat - with IVF      Frontotemporal dementia  Follows neurologist in [place] with recent increases in meds - zyprexa 2.5mg to 10mg BID + Prozac and trileptal   Trileptal level <1   Psychiatry consult - unlikely to participate in current mental state but will need some direction to appropriate regimen as for this to not happen again - possible NMS ?      Postoperative hypothyroidism  Resume synthroid   FT4 - normal       Hypernatremia    Resolved         Failure to thrive in adult  IVF until mental status is appropriate for oral diet  Progression of dementia  Palliative care discussion     If  neuro status continues, will need to place NGT for meds and nutrition       essential hypertension  If passes swallow eval, resume home norvasc  IV labetalol PRN         VTE Risk Mitigation (From admission, onward)         Ordered     enoxaparin injection 40 mg  Daily         10/29/22 1513     IP VTE HIGH RISK PATIENT  Once         10/29/22 1513     Place sequential compression device  Until discontinued         10/29/22 1513                Discharge Planning   TAYLOR: 11/4/2022     Code Status: DNR   Is the patient medically ready for discharge?:     Reason for patient still in hospital (select all that apply): Patient trending condition  Discharge Plan A: Psychiatric hospital                  Chelo Gibbs MD  Department of Hospital Medicine   [facility]

## 2022-11-02 NOTE — ASSESSMENT & PLAN NOTE
CXR:  PICC placed in good position.  1.5 cm density projecting over the right mid lung feel likely represents overlapping shadows but follow-up chest x-rays are recommended to assure resolution and absence of a pulmonary lesion  CTA head and neck :  4. Right upper lobe ground-glass opacities in patchy consolidation, nonspecific and may reflect sequelae of small airways inflammation or infection.     UA reflex to culture - NTD  Blood culture- NTD  CSF - negative      Ampicillin, acyclovir, rocephin and vancomycin - dc'd  Resp did deep suction - will send for culture - pending and covered with Unasyn

## 2022-11-02 NOTE — PLAN OF CARE
Patient resting comfortably in bed. Restrains maintained on (B) wrist for patient safety. PICC intact and infusing without difficulty. Daughter at bedside. Siderails up, call light available. 1:1 sitter at bedside.    Problem: Fall Injury Risk  Goal: Absence of Fall and Fall-Related Injury  Outcome: Ongoing, Progressing     Problem: Restraint, Nonbehavioral (Nonviolent)  Goal: Absence of Harm or Injury  Outcome: Ongoing, Progressing     Problem: Impaired Wound Healing  Goal: Optimal Wound Healing  Outcome: Ongoing, Progressing     Problem: Skin Injury Risk Increased  Goal: Skin Health and Integrity  Outcome: Ongoing, Progressing

## 2022-11-02 NOTE — CARE UPDATE
11/01/22 1936   Patient Assessment/Suction   Level of Consciousness (AVPU) responds to voice   Respiratory Effort Unlabored   Expansion/Accessory Muscles/Retractions no use of accessory muscles   All Lung Fields Breath Sounds Anterior:;coarse   PRE-TX-O2   O2 Device (Oxygen Therapy) room air   SpO2 98 %   Pulse Oximetry Type Intermittent   $ Pulse Oximetry - Multiple Charge Pulse Oximetry - Multiple   Pulse 60   Resp 18

## 2022-11-02 NOTE — CARE UPDATE
RT inserted 7.0 Nasopharyngeal trumpet due to multiple NT suctioning, last suction resulted in red sputum.      11/02/22 0345   Patient Assessment/Suction   Level of Consciousness (AVPU) responds to voice   Suction Method nasal;tracheal   $ Suction Charges NT Suction Procedure   Secretions Amount copious   Secretions Color red;yellow   Secretions Characteristics thick   Skin Integrity   $ Wound Care Tech Time 15 min   Area Observed Nares   Skin Appearance redness blanchable   PRE-TX-O2   O2 Device (Oxygen Therapy) room air        Nasopharyngeal Airway   Placement Date/Time: 11/02/22 0345   Tube Location: Right Nostril  Inserted By: RT  Insertion attempts (enter comment if more than 2 attempts): 1  Ease of Insertion: Atraumatic   Secured Location Right   Site Condition Cool;Dry

## 2022-11-02 NOTE — CONSULTS
"[facility]  Psychiatry  Telepsychiatry Consult Note    Please see previous notes:    Patient agreeable to consultation via telepsychiatry.    Tele-Consultation from Psychiatry started: 11/2/2022 at 3:23 PM  The chief complaint leading to psychiatric consultation is: "Patient with AMS from [facility] and possible neuromalignant syndrome/polypharmcy for his frontotemporal dementia"  This consultation was requested by Dr Gibbs, the Emergency Department attending physician.  The location of the consulting psychiatrist is [place].  The patient location is  [facility]*   The patient was admitted 10/29/22   Also present with the patient at the time of the consultation: wife Georgia    Patient Identification:   Ronn Caballero Jr. is a 72 y.o. male.    Patient information was obtained from spouse/SO, past medical records, and primary team.    Inpatient consult to Psychiatry  Consult performed by: Nayana Yo MD  Consult ordered by: Chelo Gbibs MD      Teleconsult Time Documentation  Subjective:     History of Present Illness:  Per medicine progress note 11/2/22: "Principal Problem:Dementia with behavioral disturbance           HPI:  73 yo male with h/o thyroid cancer, frontal temporal lobe dementia, HTN, HLP, depression and anxiety transferred from [facility] inpatient service due to 6 days of failure to thrive. It is reported from the facility that he was PEC/CEC (expires 11/3/22) due to combative behavior at home. History comes from the patient's spouse and sister-in -law at bedside. Patient follows a neurologist in [place] and spouse reports recent change in medications include increase of zyprexa from 2.5mg to 10mg BID. He was also started on gabapentin TID. Spouse denies recent fever, chills, cough, D/N/V.  Per the MAR from [facility] he received Haldol x4 IM, benadryl and ativan IV x2. The facility reports he was not eating or drinking and no witnessed episodes of " diarrhea or vomiting. On our workup he has Na153, elevated  AST, hematuria, WBC 17k with 2% bands, lactic acid 1.2. No source of infection on UA or chest xray. On exam he does show discomfort in RUQ and midepigastrium. Abd CT and lipase pending. Troponin mildly elevated 0.056 with some EKG changes.      Hospital medicine consulted for further medical management. Physical restraints will be used to protect patient as he is pulling at cardiac leads and peripheral IV. Neurology consulted. Needs tele sitter.         Overview/Hospital Course:  Patient admitted from [facility] facility with failure to thrive and progressive psychosis. He has h/o frontal temporal dementia. Overnight he was given IV ativan and has no improvement in behavior. Patient unable to participate in a telepsych visit. Neurology was consulted on guidance to medications. Appears he was given a combination of meds in addition to his home medication and possibly polypharmacy resulted in psychosis. He does require restraints as he is a harm to himself. Will try to resume home medications if he can follow commands to swallow pills. Electrolyte replacement by IV. IVF for hypernatremia and elevated CP >3000. Monitoring on tele due to QT prolongation. The spouse and daughter at bedside and agree to palliative care discussion in regards to goals of care and advanced care planning.      Fevers persistent - started on amp, rocephin, vanc and acyclovir. LP completed and studies pending. Brain MRI: poor study due to motion artifact, generalized volume loss. Zyprexa dc'd, concern for NMS. Ativan IV Ordered. Restraints remain in place. Picc line placed to RUE. TPN ordered for nutrition. Unable to take oral meds and diet due to mental status. CK trending down 1700. Na 144.      Meningitis antibiotics dc'd, ativan weaning, TPN infusing, CXR did not show infiltrates, continues to spike fevers.  Psychiatry consult placed, though not able to do if patient  "cannot communicate on camera. Resp culture pending, unasyn IV should cover. Continuing dantrolene q6 hours. With drop in O2 sats on RA, place on supplemental O2. CK trending down. LA normal.         Interval History: pt resting comfortably, not contracted; nasal trumpet in place and picc line to LUE...Neurological:      Mental Status: He is disoriented.      Comments: Not able to follow commands due to agitation    Psychiatric:      Comments: Yelling, screaming, cussing  ...Dementia with behavioral disturbance  Sharp change in status. Possibly infectious or metabolic   CT of abdomen and pelvis - left kidney mass vs complex cyst, non obstructing stone   IVF   Neurology consulted - patient unable to do tele psych visit at this time   Psychiatry consult placed - patient cannot participate in current state - possible medication recommendation for FTD as to cause NMS in future   [facility]   Neurology recs: - resume home meds- if able to follow commands and swallow properly   Restraints if danger to himself  Tele monitoring "     Per neurology progress note 11/2/22: "10/31/2022:  Patient was seen and examined by me this morning.  He is encephalopathic and only able to tell me his name on repeated questioning.  Patient's wife was at bedside and states that he has a history of frontotemporal dementia however last Sunday he suddenly became altered and combative for which he was taken to the hospital and then to [facility] service.     His CK has been elevated and he has been encephalopathic and febrile.      11/01/2022:  Patient was seen examined by me this morning.  He remains to be somnolent and exam unchanged.     11/02/2022: Patient was seen and examined by me. He is somnolent and does open eyes to stimulus and does not follow commands or answer questions.  Patient's wife is at bedside and I discussed plan of care.  Patient has been febrile overnight.  Lumbar puncture was done " "yesterday and showed WBC 4, protein 32, glucose 62"    Per ID progress note 11/2/22: "11/1: Interim reviewed, patient seen and examined at beside, remains somnolent, not following commands, gargling. Hypertensive, Tmax 102.3 overnight, currently afebrile. Discussed with neurology, will try empiric dantrolene, ordered MRI brain and attempt LP. Labs reviewed, WBC 9.8, left shift 75.4%, creatinie stable. Phsophorus 1.7, AST 56 trending down, ALT normal. Micro reviewed, no growth so far.      11/2:  Interim reviewed, patient seen examined at bedside.  Remains somnolent, not following commands.  Hypertensive, T-max 101.4° yesterday, currently afebrile.  Had LP performed yesterday, WBC 4, normal glucose and protein, negative for meningitis.  Discussed with respiratory therapy, copious amount of purulent secretions suctioned, specimen sent for culture.  Labs reviewed, white count 10.4, left shift 76%, H&H 11.2/33.7, platelet count 228.  Stable kidney function, albumin 2.4, normal LFTs.  CPK trending down 06/03/2005, lactic acid 1, procalcitonin 0.7, suspecting ongoing aspiration pneumonia."    Pt is a 71 y/o man with PMH as above and past psychiatric h/o ADELINE currently admitted to medicine as above. Chart reviewed. On exam, pt unable to participate 2/2 AMS, spoke with spouse Georgia Dillon. Says "this morning he was quiet and calm," says pt "had some procedures done and had to keep him pretty calm with a lot of Ativan." Has noticed since then pt "hasn't opened his eyes...Sometimes can get yes or no, other than that mumbling, hard to understand." Says pt also with episodes of agitation, staff tried to clean him up, if she tries to adjust his leg pt will "want to kick at her," has pulled out catheter. Says pt has h/o anxiety, noted paranoia and AVH developed about 5 weeks ago, tries to go along with them but difficult at times. Pt's mother has vascular dementia. No other person psychiatric hx for pt. Wife reports pt " "previously tried Trazodone for nightmares, given 75 mg -- pt slept but claims "made him dream worse." Amenable to retrial, discussed role of VPA, also in agreement.      Per collateral 2/2 AMS:  Psychiatric History:   Previous Psychiatric Hospitalizations: yes [facility] Oct 2022  Previous Medication Trials: yes  Previous Suicide Attempts: unable to assess  History of Violence: unable to assess  History of Depression: unable to assess  History of Alana: unable to assess  History of Auditory/Visual Hallucination yes  History of Delusions: yes  Outpatient psychiatrist (current & past): unable to assess    Substance Abuse History:  Tobacco:unable to assess  Alcohol: unable to assess  Illicit Substances:unable to assess  Detox/Rehab: unable to assess    Legal History: Past charges/incarcerations: unable to assess     Family Psychiatric History: mother with vascular dementia      Social History:  Developmental/Childhood:unable to assess  *Education:unable to assess  Employment Status/Finances:unable to assess   Relationship Status/Sexual Orientation:   Children: yes  Housing Status: home w wife   history:  unable to assess  Access to gun: unable to assess  Rastafarian:unable to assess  Recreational activities:unable to assess    Psychiatric Mental Status Exam:  Arousal: awake  Sensorium/Orientation: oriented to unable to assess  Behavior/Cooperation: uncooperative, does not follow commands   Speech: mute  Language: unable to assess  Mood: unable to assess  Affect: unable to assess  Thought Process: unable to assess  Thought Content:   Auditory hallucinations: unable to assess  Visual hallucinations: unable to assess  Paranoia: unable to assess  Delusions:  unable to assess  Suicidal ideation: unable to assess  Homicidal ideation: unable to assess  Attention/Concentration:  unable to assess  Memory:    Recent: unable to assess   Remote: unable to assess  Fund of Knowledge: unable to assess  Abstract reasoning: " unable to assess  Insight:limited  Judgment: limited      Past Medical History:   Past Medical History:   Diagnosis Date    Cancer     Dementia     Depression     Hypertension       Laboratory Data:   Labs Reviewed   CBC W/ AUTO DIFFERENTIAL - Abnormal; Notable for the following components:       Result Value    WBC 17.42 (*)     RBC 4.44 (*)     Hemoglobin 13.7 (*)     Gran % 85.0 (*)     Lymph % 3.0 (*)     All other components within normal limits   COMPREHENSIVE METABOLIC PANEL - Abnormal; Notable for the following components:    Sodium 153 (*)     Chloride 115 (*)     BUN 42 (*)     Calcium 10.7 (*)      (*)     All other components within normal limits   URINALYSIS, REFLEX TO URINE CULTURE - Abnormal; Notable for the following components:    Specific Gravity, UA >=1.030 (*)     Protein, UA 1+ (*)     Ketones, UA 1+ (*)     Bilirubin (UA) 1+ (*)     Occult Blood UA 3+ (*)     Urobilinogen, UA 2.0-3.0 (*)     All other components within normal limits    Narrative:     Specimen Source->Urine   URINALYSIS MICROSCOPIC - Abnormal; Notable for the following components:    RBC, UA 8 (*)     All other components within normal limits    Narrative:     Specimen Source->Urine   TROPONIN I - Abnormal; Notable for the following components:    Troponin I 0.056 (*)     All other components within normal limits   CK - Abnormal; Notable for the following components:    CPK 3221 (*)     All other components within normal limits   LACTIC ACID, PLASMA   T4, FREE   LIPASE       Neurological History:  Seizures: No  Head trauma: No    Allergies:   Review of patient's allergies indicates:  No Known Allergies    Medications in ER:   Medications   sodium chloride 0.9% flush 10 mL (has no administration in time range)   naloxone 0.4 mg/mL injection 0.02 mg (has no administration in time range)   glucose chewable tablet 16 g (has no administration in time range)   glucose chewable tablet 24 g (has no administration in time range)    glucagon (human recombinant) injection 1 mg (has no administration in time range)   dextrose 10% bolus 125 mL (has no administration in time range)   dextrose 10% bolus 250 mL (has no administration in time range)   enoxaparin injection 40 mg (40 mg Subcutaneous Given 11/1/22 1811)   senna-docusate 8.6-50 mg per tablet 1 tablet (1 tablet Oral Not Given 11/2/22 0900)   bisacodyL suppository 10 mg (has no administration in time range)   ondansetron injection 4 mg (has no administration in time range)   aluminum-magnesium hydroxide-simethicone 200-200-20 mg/5 mL suspension 30 mL (has no administration in time range)   scopolamine 1.3-1.5 mg (1 mg over 3 days) 1 patch (1 patch Transdermal Patch Applied 10/31/22 1338)   labetaloL injection 20 mg (has no administration in time range)   acetaminophen suppository 650 mg (650 mg Rectal Given 11/2/22 0424)   levothyroxine injection 44 mcg (44 mcg Intravenous Given 11/2/22 0934)   dantrolene (DANTRIUM) 55 mg in sterile water IVPB (55 mg Intravenous New Bag 11/2/22 1338)   bromocriptine tablet 2.5 mg (2.5 mg Oral Not Given 11/2/22 0900)   Amino acid 5% - dextrose 15% (CLINIMIX-E) solution with additives (1L  provides 510 kcal/L dextrose, with 50 gm AA, 150 gm CHO, Na 35, K 30, Mg 5, Ca 4.5, Acetate 80, Cl 39, Phos 15) ( Intravenous New Bag 11/1/22 0873)   sodium chloride 0.9% flush 10 mL (10 mLs Intravenous Given 11/2/22 4990)     And   sodium chloride 0.9% flush 10 mL (has no administration in time range)   lactated ringers infusion (0 mL/hr Intravenous Paused 11/2/22 1338)   ampicillin-sulbactam 3 g in sodium chloride 0.9 % 100 mL IVPB (ready to mix system) (3 g Intravenous New Bag 11/2/22 1559)   LORazepam injection 1 mg (has no administration in time range)   valproate (DEPACON) 250 mg in dextrose 5 % 50 mL IVPB (has no administration in time range)   sodium chloride 0.9% bolus 1,000 mL (0 mLs Intravenous Stopped 10/29/22 1215)   haloperidol lactate injection 2.5 mg (2.5  mg Intravenous Given 10/29/22 1158)   LORazepam injection 1 mg (1 mg Intravenous Given 10/29/22 1152)   diphenhydrAMINE injection 25 mg (25 mg Intravenous Given 10/29/22 1152)   OLANZapine injection 5 mg (5 mg Intramuscular Given 10/29/22 1627)   LORazepam injection 1 mg (1 mg Intravenous Given 10/29/22 1627)   LORazepam injection 1 mg (1 mg Intravenous Given 10/29/22 2034)   LORazepam injection 2 mg (2 mg Intramuscular Given 10/30/22 0100)   acetaminophen suppository 650 mg (650 mg Rectal Given 10/30/22 0100)   potassium chloride 10 mEq in 100 mL IVPB (10 mEq Intravenous New Bag 10/30/22 1215)   OLANZapine injection 5 mg (5 mg Intramuscular Given 10/30/22 1544)   OLANZapine injection 10 mg (10 mg Intramuscular Given 10/30/22 1756)   OLANZapine injection 5 mg (5 mg Intramuscular Given 10/31/22 0009)   acetaminophen suppository 650 mg (650 mg Rectal Given 10/31/22 0126)   potassium chloride 10 mEq in 100 mL IVPB (0 mEq Intravenous Stopped 10/31/22 1521)   OLANZapine injection 5 mg (5 mg Intramuscular Given 10/31/22 1236)   iohexoL (OMNIPAQUE 350) 350 mg iodine/mL injection (75 mLs Intravenous Given 10/31/22 1430)   cefTRIAXone (ROCEPHIN) 1 g/50 mL D5W IVPB (0 g Intravenous Stopped 10/31/22 1708)   gadobutroL (GADAVIST) injection 5 mL (5 mLs Intravenous Given 11/1/22 1107)   potassium phosphate 30 mmol in dextrose 5 % 500 mL infusion (0 mmol Intravenous Stopped 11/1/22 1854)   fat emulsion 20% infusion 250 mL (0 mLs Intravenous Stopped 11/2/22 1117)   LIDOcaine HCL 10 mg/ml (1%) injection 1 mL ( Other Canceled Entry 11/1/22 1545)   LORazepam injection 1 mg (1 mg Intravenous Given 11/1/22 1455)       Medications at home:   Current Discharge Medication List        CONTINUE these medications which have NOT CHANGED    Details   ALPRAZolam (XANAX XR) 0.5 MG Tb24 Take by mouth once daily.      amLODIPine (NORVASC) 10 MG tablet Take 10 mg by mouth once daily.      aspirin (ECOTRIN) 81 MG EC tablet Take 81 mg by mouth once  daily.      atorvastatin (LIPITOR) 40 MG tablet Take 40 mg by mouth once daily.      FLUoxetine 40 MG capsule Take 40 mg by mouth once daily.      levothyroxine (SYNTHROID) 88 MCG tablet Take 88 mcg by mouth before breakfast.      OLANZapine (ZYPREXA) 2.5 MG tablet Take 2.5 mg by mouth every evening.      OXcarbazepine (TRILEPTAL) 300 MG Tab Take 150 mg by mouth 2 (two) times daily.      vitamin D (VITAMIN D3) 1000 units Tab Take 1,000 Units by mouth once daily.               Assessment - Diagnosis - Goals:     Diagnosis/Impression:   Acute encephalopathy; c/f infectious process (being followed by ID and tx for possible aspiration PNA, LP done yesterday) vs NMS   FTD    Rec:   - agree with valproic acid as off label tx for agitation associated with acute encephalopathy; would suggest starting 125 mg TID--hold if pt sedated, check VPA level and ammonia level after 72 hours  - consider starting trazodone 25 mg qhs--has been effective in FTD case reports; some benefit in past per pt's wife  - continue to hold zyprexa and prozac--FTD pts at high risk of adverse and paradoxical behavioral reactions to antipsychotics and SSRIs, agree w limiting benzodiazepines for same reason  - ok to allow CEC to --would not benefit from judicial commitment to a psychiatric facility at this point, will continue to optimize care for behavioral sxs, agree w palliative care involvement given chronic and progressive nature of disease with limited known effective treatments    Time with patient, chart, collateral: 55      More than 50% of the time was spent counseling/coordinating care    Consulting clinician was informed of the encounter and consult note.    Consultation ended: 2022 at 4:19 PM      Nayana Yo MD   Psychiatry  [facility]

## 2022-11-02 NOTE — SUBJECTIVE & OBJECTIVE
Interval History: pt resting comfortably, not contracted; nasal trumpet in place and picc line to LUE    Review of Systems   Unable to perform ROS: Psychiatric disorder   Objective:     Vital Signs (Most Recent):  Temp: 99.2 °F (37.3 °C) (11/02/22 1126)  Pulse: 65 (11/02/22 1126)  Resp: 17 (11/02/22 1126)  BP: (!) 167/72 (11/02/22 1126)  SpO2: (!) 94 % (11/02/22 1126) Vital Signs (24h Range):  Temp:  [99.2 °F (37.3 °C)-101.4 °F (38.6 °C)] 99.2 °F (37.3 °C)  Pulse:  [59-82] 65  Resp:  [16-24] 17  SpO2:  [90 %-98 %] 94 %  BP: (130-179)/(68-83) 167/72     Weight: 56.7 kg (125 lb)  Body mass index is 19.58 kg/m².    Intake/Output Summary (Last 24 hours) at 11/2/2022 1355  Last data filed at 11/2/2022 0917  Gross per 24 hour   Intake 10 ml   Output 2000 ml   Net -1990 ml        Physical Exam  Constitutional:       General: He is in acute distress.      Comments: Restless in restraints    Cardiovascular:      Rate and Rhythm: Normal rate and regular rhythm.      Heart sounds: No murmur heard.  Pulmonary:      Effort: Pulmonary effort is normal.      Breath sounds: Normal breath sounds. No wheezing.   Abdominal:      General: There is no distension.      Palpations: There is no mass.   Musculoskeletal:         General: Normal range of motion.   Skin:     General: Skin is warm and dry.   Neurological:      Mental Status: He is disoriented.      Comments: Not able to follow commands due to agitation    Psychiatric:      Comments: Yelling, screaming, cussing        Significant Labs: All pertinent labs within the past 24 hours have been reviewed.  Blood Culture:   No results for input(s): LABBLOO in the last 48 hours.    CBC:   Recent Labs   Lab 11/01/22 0447 11/02/22 0453   WBC 9.89 10.40   HGB 11.3* 11.2*   HCT 33.7* 33.7*    228       CMP:   Recent Labs   Lab 11/01/22 0447 11/02/22 0452    140   K 2.9* 3.7   * 110   CO2 22* 21*   * 124*   BUN 14 13   CREATININE 0.9 0.8   CALCIUM 8.7 8.7   PROT  5.6* 5.6*   ALBUMIN 2.6* 2.4*   BILITOT 0.7 0.4   ALKPHOS 79 83   AST 56* 40   ALT 42 38   ANIONGAP 10 9       Cardiac Markers: No results for input(s): CKMB, MYOGLOBIN, BNP, TROPISTAT in the last 48 hours.  Lactic Acid:   Recent Labs   Lab 11/02/22  0800   LACTATE 1.0       Lipase:   No results for input(s): LIPASE in the last 48 hours.    Lipid Panel: No results for input(s): CHOL, HDL, LDLCALC, TRIG, CHOLHDL in the last 48 hours.  Magnesium:   Recent Labs   Lab 11/01/22  0447 11/02/22  0452   MG 1.8 1.9       POCT Glucose: No results for input(s): POCTGLUCOSE in the last 48 hours.  Troponin:   No results for input(s): TROPONINI, TROPONINIHS in the last 48 hours.    TSH:   Recent Labs   Lab 10/23/22  1415   TSH 0.976       Urine Studies:   Recent Labs   Lab 10/31/22  1610   COLORU Yellow   APPEARANCEUA Clear   PHUR 6.0   SPECGRAV 1.010   PROTEINUA Negative   GLUCUA Negative   KETONESU Negative   BILIRUBINUA Negative   OCCULTUA Trace*   NITRITE Negative   UROBILINOGEN Negative   LEUKOCYTESUR Negative         Significant Imaging: I have reviewed all pertinent imaging results/findings within the past 24 hours.

## 2022-11-02 NOTE — CARE UPDATE
11/02/22 0728   Patient Assessment/Suction   Level of Consciousness (AVPU) responds to voice   Respiratory Effort Unlabored   Expansion/Accessory Muscles/Retractions no use of accessory muscles   All Lung Fields Breath Sounds coarse   PRE-TX-O2   O2 Device (Oxygen Therapy) room air   SpO2 (!) 90 %   Pulse Oximetry Type Intermittent   $ Pulse Oximetry - Multiple Charge Pulse Oximetry - Multiple   Pulse 68   Resp (!) 24

## 2022-11-02 NOTE — ASSESSMENT & PLAN NOTE
Follows neurologist in [place] with recent increases in meds - zyprexa 2.5mg to 10mg BID + Prozac and trileptal   Trileptal level <1   Psychiatry consult - unlikely to participate in current mental state but will need some direction to appropriate regimen as for this to not happen again - possible NMS ?

## 2022-11-02 NOTE — ASSESSMENT & PLAN NOTE
Sharp change in status. Possibly infectious or metabolic   CT of abdomen and pelvis - left kidney mass vs complex cyst, non obstructing stone   IVF   Neurology consulted - patient unable to do tele psych visit at this time   Psychiatry consult placed - patient cannot participate in current state - possible medication recommendation for FTD as to cause NMS in future   [facility]   [facility] recs: - resume home meds- if able to follow commands and swallow properly   Restraints if danger to himself  Tele monitoring     Head CT and brain MRi reviewed

## 2022-11-03 PROBLEM — G21.0 NMS (NEUROLEPTIC MALIGNANT SYNDROME): Status: ACTIVE | Noted: 2022-11-03

## 2022-11-03 LAB
ALBUMIN SERPL BCP-MCNC: 2.3 G/DL (ref 3.5–5.2)
ALP SERPL-CCNC: 71 U/L (ref 55–135)
ALT SERPL W/O P-5'-P-CCNC: 36 U/L (ref 10–44)
ANION GAP SERPL CALC-SCNC: 6 MMOL/L (ref 8–16)
AST SERPL-CCNC: 30 U/L (ref 10–40)
BASOPHILS # BLD AUTO: 0.03 K/UL (ref 0–0.2)
BASOPHILS NFR BLD: 0.3 % (ref 0–1.9)
BILIRUB SERPL-MCNC: 0.7 MG/DL (ref 0.1–1)
BUN SERPL-MCNC: 13 MG/DL (ref 8–23)
C GATTII+NEOFOR DNA CSF QL NAA+NON-PROBE: NEGATIVE
CALCIUM SERPL-MCNC: 8.8 MG/DL (ref 8.7–10.5)
CHLORIDE SERPL-SCNC: 109 MMOL/L (ref 95–110)
CMV DNA CSF QL NAA+NON-PROBE: NEGATIVE
CO2 SERPL-SCNC: 22 MMOL/L (ref 23–29)
CREAT SERPL-MCNC: 0.7 MG/DL (ref 0.5–1.4)
DIFFERENTIAL METHOD: ABNORMAL
E COLI K1 DNA CSF QL NAA+NON-PROBE: NEGATIVE
EOSINOPHIL # BLD AUTO: 0.3 K/UL (ref 0–0.5)
EOSINOPHIL NFR BLD: 3 % (ref 0–8)
ERYTHROCYTE [DISTWIDTH] IN BLOOD BY AUTOMATED COUNT: 12.4 % (ref 11.5–14.5)
EST. GFR  (NO RACE VARIABLE): >60 ML/MIN/1.73 M^2
EV RNA CSF QL NAA+NON-PROBE: NEGATIVE
GLUCOSE SERPL-MCNC: 91 MG/DL (ref 70–110)
GP B STREP DNA CSF QL NAA+NON-PROBE: NEGATIVE
HAEM INFLU DNA CSF QL NAA+NON-PROBE: NEGATIVE
HCT VFR BLD AUTO: 34.3 % (ref 40–54)
HGB BLD-MCNC: 11.2 G/DL (ref 14–18)
HHV6 DNA CSF QL NAA+NON-PROBE: NEGATIVE
HSV1 DNA CSF QL NAA+NON-PROBE: NEGATIVE
HSV2 DNA CSF QL NAA+NON-PROBE: NEGATIVE
IMM GRANULOCYTES # BLD AUTO: 0.14 K/UL (ref 0–0.04)
IMM GRANULOCYTES NFR BLD AUTO: 1.4 % (ref 0–0.5)
L MONOCYTOG DNA CSF QL NAA+NON-PROBE: NEGATIVE
LYMPHOCYTES # BLD AUTO: 1.1 K/UL (ref 1–4.8)
LYMPHOCYTES NFR BLD: 10.8 % (ref 18–48)
MAGNESIUM SERPL-MCNC: 1.8 MG/DL (ref 1.6–2.6)
MCH RBC QN AUTO: 30.3 PG (ref 27–31)
MCHC RBC AUTO-ENTMCNC: 32.7 G/DL (ref 32–36)
MCV RBC AUTO: 93 FL (ref 82–98)
MICROBIOLOGIST REVIEW: NORMAL
MONOCYTES # BLD AUTO: 1.4 K/UL (ref 0.3–1)
MONOCYTES NFR BLD: 14.5 % (ref 4–15)
N MEN DNA CSF QL NAA+NON-PROBE: NEGATIVE
NEUTROPHILS # BLD AUTO: 6.9 K/UL (ref 1.8–7.7)
NEUTROPHILS NFR BLD: 70 % (ref 38–73)
NRBC BLD-RTO: 0 /100 WBC
PARECHOVIRUS A RNA CSF QL NAA+NON-PROBE: NEGATIVE
PHOSPHATE SERPL-MCNC: 2.5 MG/DL (ref 2.7–4.5)
PLATELET # BLD AUTO: 197 K/UL (ref 150–450)
PMV BLD AUTO: 11.2 FL (ref 9.2–12.9)
POTASSIUM SERPL-SCNC: 4.1 MMOL/L (ref 3.5–5.1)
PROT SERPL-MCNC: 5.4 G/DL (ref 6–8.4)
RBC # BLD AUTO: 3.7 M/UL (ref 4.6–6.2)
S PNEUM DNA CSF QL NAA+NON-PROBE: NEGATIVE
SODIUM SERPL-SCNC: 137 MMOL/L (ref 136–145)
VIT B1 BLD-MCNC: 58 UG/L (ref 38–122)
VZV DNA CSF QL NAA+NON-PROBE: NEGATIVE
WBC # BLD AUTO: 9.89 K/UL (ref 3.9–12.7)

## 2022-11-03 PROCEDURE — 94761 N-INVAS EAR/PLS OXIMETRY MLT: CPT

## 2022-11-03 PROCEDURE — B4185 PARENTERAL SOL 10 GM LIPIDS: HCPCS | Performed by: HOSPITALIST

## 2022-11-03 PROCEDURE — 27000221 HC OXYGEN, UP TO 24 HOURS

## 2022-11-03 PROCEDURE — 83735 ASSAY OF MAGNESIUM: CPT | Performed by: HOSPITALIST

## 2022-11-03 PROCEDURE — A4217 STERILE WATER/SALINE, 500 ML: HCPCS | Performed by: INTERNAL MEDICINE

## 2022-11-03 PROCEDURE — 12000002 HC ACUTE/MED SURGE SEMI-PRIVATE ROOM

## 2022-11-03 PROCEDURE — 36415 COLL VENOUS BLD VENIPUNCTURE: CPT | Performed by: HOSPITALIST

## 2022-11-03 PROCEDURE — A4216 STERILE WATER/SALINE, 10 ML: HCPCS | Performed by: HOSPITALIST

## 2022-11-03 PROCEDURE — 31720 CLEARANCE OF AIRWAYS: CPT

## 2022-11-03 PROCEDURE — 95816 EEG AWAKE AND DROWSY: CPT | Mod: 26,,, | Performed by: PSYCHIATRY & NEUROLOGY

## 2022-11-03 PROCEDURE — 25000003 PHARM REV CODE 250: Performed by: STUDENT IN AN ORGANIZED HEALTH CARE EDUCATION/TRAINING PROGRAM

## 2022-11-03 PROCEDURE — 99233 SBSQ HOSP IP/OBS HIGH 50: CPT | Mod: S$GLB,,, | Performed by: STUDENT IN AN ORGANIZED HEALTH CARE EDUCATION/TRAINING PROGRAM

## 2022-11-03 PROCEDURE — 25000003 PHARM REV CODE 250: Performed by: INTERNAL MEDICINE

## 2022-11-03 PROCEDURE — 63600175 PHARM REV CODE 636 W HCPCS: Performed by: INTERNAL MEDICINE

## 2022-11-03 PROCEDURE — 63600175 PHARM REV CODE 636 W HCPCS: Performed by: HOSPITALIST

## 2022-11-03 PROCEDURE — 63600175 PHARM REV CODE 636 W HCPCS: Performed by: STUDENT IN AN ORGANIZED HEALTH CARE EDUCATION/TRAINING PROGRAM

## 2022-11-03 PROCEDURE — 80053 COMPREHEN METABOLIC PANEL: CPT | Performed by: HOSPITALIST

## 2022-11-03 PROCEDURE — 99233 PR SUBSEQUENT HOSPITAL CARE,LEVL III: ICD-10-PCS | Mod: S$GLB,,, | Performed by: STUDENT IN AN ORGANIZED HEALTH CARE EDUCATION/TRAINING PROGRAM

## 2022-11-03 PROCEDURE — 85025 COMPLETE CBC W/AUTO DIFF WBC: CPT | Performed by: HOSPITALIST

## 2022-11-03 PROCEDURE — 25000003 PHARM REV CODE 250: Performed by: HOSPITALIST

## 2022-11-03 PROCEDURE — 95816 PR EEG,W/AWAKE & DROWSY RECORD: ICD-10-PCS | Mod: 26,,, | Performed by: PSYCHIATRY & NEUROLOGY

## 2022-11-03 PROCEDURE — 84100 ASSAY OF PHOSPHORUS: CPT | Performed by: HOSPITALIST

## 2022-11-03 RX ORDER — LORAZEPAM 2 MG/ML
1 INJECTION INTRAMUSCULAR ONCE
Status: COMPLETED | OUTPATIENT
Start: 2022-11-03 | End: 2022-11-03

## 2022-11-03 RX ADMIN — AMPICILLIN SODIUM AND SULBACTAM SODIUM 3 G: 2; 1 INJECTION, POWDER, FOR SOLUTION INTRAMUSCULAR; INTRAVENOUS at 04:11

## 2022-11-03 RX ADMIN — DANTROLENE SODIUM 55 MG: 20 INJECTION INTRAVENOUS at 02:11

## 2022-11-03 RX ADMIN — DEXTROSE 250 MG: 50 INJECTION, SOLUTION INTRAVENOUS at 01:11

## 2022-11-03 RX ADMIN — LORAZEPAM 1 MG: 2 INJECTION INTRAMUSCULAR; INTRAVENOUS at 10:11

## 2022-11-03 RX ADMIN — DEXTROSE 250 MG: 50 INJECTION, SOLUTION INTRAVENOUS at 10:11

## 2022-11-03 RX ADMIN — DANTROLENE SODIUM 55 MG: 20 INJECTION INTRAVENOUS at 01:11

## 2022-11-03 RX ADMIN — SODIUM CHLORIDE, SODIUM LACTATE, POTASSIUM CHLORIDE, AND CALCIUM CHLORIDE: .6; .31; .03; .02 INJECTION, SOLUTION INTRAVENOUS at 12:11

## 2022-11-03 RX ADMIN — ASCORBIC ACID, VITAMIN A PALMITATE, CHOLECALCIFEROL, THIAMINE HYDROCHLORIDE, RIBOFLAVIN-5 PHOSPHATE SODIUM, PYRIDOXINE HYDROCHLORIDE, NIACINAMIDE, DEXPANTHENOL, ALPHA-TOCOPHEROL ACETATE, VITAMIN K1, FOLIC ACID, BIOTIN, CYANOCOBALAMIN: 200; 3300; 200; 6; 3.6; 6; 40; 15; 10; 150; 600; 60; 5 INJECTION, SOLUTION INTRAVENOUS at 01:11

## 2022-11-03 RX ADMIN — SODIUM CHLORIDE, PRESERVATIVE FREE 10 ML: 5 INJECTION INTRAVENOUS at 06:11

## 2022-11-03 RX ADMIN — ENOXAPARIN SODIUM 40 MG: 100 INJECTION SUBCUTANEOUS at 04:11

## 2022-11-03 RX ADMIN — SODIUM CHLORIDE, PRESERVATIVE FREE 10 ML: 5 INJECTION INTRAVENOUS at 12:11

## 2022-11-03 RX ADMIN — SODIUM CHLORIDE, SODIUM LACTATE, POTASSIUM CHLORIDE, AND CALCIUM CHLORIDE: .6; .31; .03; .02 INJECTION, SOLUTION INTRAVENOUS at 01:11

## 2022-11-03 RX ADMIN — DANTROLENE SODIUM 55 MG: 20 INJECTION INTRAVENOUS at 07:11

## 2022-11-03 RX ADMIN — AMPICILLIN SODIUM AND SULBACTAM SODIUM 3 G: 2; 1 INJECTION, POWDER, FOR SOLUTION INTRAMUSCULAR; INTRAVENOUS at 10:11

## 2022-11-03 RX ADMIN — DEXTROSE 250 MG: 50 INJECTION, SOLUTION INTRAVENOUS at 06:11

## 2022-11-03 RX ADMIN — SODIUM CHLORIDE, PRESERVATIVE FREE 10 ML: 5 INJECTION INTRAVENOUS at 11:11

## 2022-11-03 RX ADMIN — SODIUM CHLORIDE, SODIUM LACTATE, POTASSIUM CHLORIDE, AND CALCIUM CHLORIDE: .6; .31; .03; .02 INJECTION, SOLUTION INTRAVENOUS at 04:11

## 2022-11-03 RX ADMIN — I.V. FAT EMULSION 250 ML: 20 EMULSION INTRAVENOUS at 10:11

## 2022-11-03 RX ADMIN — AMPICILLIN SODIUM AND SULBACTAM SODIUM 3 G: 2; 1 INJECTION, POWDER, FOR SOLUTION INTRAMUSCULAR; INTRAVENOUS at 09:11

## 2022-11-03 RX ADMIN — SCOPALAMINE 1 PATCH: 1 PATCH, EXTENDED RELEASE TRANSDERMAL at 04:11

## 2022-11-03 RX ADMIN — DANTROLENE SODIUM 55 MG: 20 INJECTION INTRAVENOUS at 11:11

## 2022-11-03 RX ADMIN — LEVOTHYROXINE SODIUM 44 MCG: 20 INJECTION, SOLUTION INTRAVENOUS at 09:11

## 2022-11-03 NOTE — PROGRESS NOTES
[facility] Medicine  Progress Note    Patient Name: Ronn Caballero Jr.  MRN: [id number]  Patient Class: IP- Inpatient   Admission Date: 10/29/2022  Length of Stay: 5 days  Attending Physician: Chelo Gibbs MD  Primary Care Provider: JELLY Decker        Subjective:     Principal Problem:Dementia with behavioral disturbance        HPI:  71 yo male with h/o thyroid cancer, frontal temporal lobe dementia, HTN, HLP, depression and anxiety transferred from [facility] due to 6 days of failure to thrive. It is reported from the facility that he was PEC/CEC (expires 11/3/22) due to combative behavior at home. History comes from the patient's spouse and sister-in -law at bedside. Patient follows a neurologist in [place] and spouse reports recent change in medications include increase of zyprexa from 2.5mg to 10mg BID. He was also started on gabapentin TID. Spouse denies recent fever, chills, cough, D/N/V.  Per the MAR from [place] he received Haldol x4 IM, benadryl and ativan IV x2. The facility reports he was not eating or drinking and no witnessed episodes of diarrhea or vomiting. On our workup he has Na153, elevated  AST, hematuria, WBC 17k with 2% bands, lactic acid 1.2. No source of infection on UA or chest xray. On exam he does show discomfort in RUQ and midepigastrium. Abd CT and lipase pending. Troponin mildly elevated 0.056 with some EKG changes.     Hospital medicine consulted for further medical management. Physical restraints will be used to protect patient as he is pulling at cardiac leads and peripheral IV. Neurology consulted. Needs tele sitter.       Overview/Hospital Course:  Patient admitted from [facility] facility with failure to thrive and progressive psychosis. He has h/o frontal temporal dementia. Overnight he was given IV ativan and has no improvement in behavior. Patient unable to participate in a telepsych visit.  Neurology was consulted on guidance to medications. Appears he was given a combination of meds in addition to his home medication and possibly polypharmacy resulted in psychosis. He does require restraints as he is a harm to himself. Will try to resume home medications if he can follow commands to swallow pills. Electrolyte replacement by IV. IVF for hypernatremia and elevated CP >3000. Monitoring on tele due to QT prolongation. The spouse and daughter at bedside and agree to palliative care discussion in regards to goals of care and advanced care planning.     Fevers persistent - started on amp, rocephin, vanc and acyclovir. LP completed and studies pending. Brain MRI: poor study due to motion artifact, generalized volume loss. Zyprexa dc'd, concern for NMS. Ativan IV Ordered. Restraints remain in place. Picc line placed to RUE. TPN ordered for nutrition. Unable to take oral meds and diet due to mental status. CK trending down 1700. Na 144.     Meningitis antibiotics dc'd, ativan weaning, TPN infusing, CXR did not show infiltrates, continues to spike fevers.  Psychiatry consult placed, though not able to do if patient cannot communicate on camera. Resp culture pending, unasyn IV should cover. Continuing dantrolene q8 hours. With drop in O2 sats on RA, place on supplemental O2. CK trending down. LA normal.     PEC/CEC dc'd/. Fevers resolved. Resp culture + staph - species pending. ID, neurology and psychiatry following. Depakote IV for mood stabilization. Prefer to not try anti-psychotics and SSRi in setting of possible NMS.       Interval History: pt agitated in restraints but not as tightly contracted.  Family at bedside.    Review of Systems   Unable to perform ROS: Psychiatric disorder   Objective:     Vital Signs (Most Recent):  Temp: 99.5 °F (37.5 °C) (22 1110)  Pulse: 73 (22 1110)  Resp: 17 (22 1110)  BP: 122/72 (22 1110)  SpO2: 95 % (22 1110) Vital Signs (24h  Range):  Temp:  [97.5 °F (36.4 °C)-100 °F (37.8 °C)] 99.5 °F (37.5 °C)  Pulse:  [60-89] 73  Resp:  [17-20] 17  SpO2:  [95 %-99 %] 95 %  BP: (122-176)/(66-91) 122/72     Weight: 56.7 kg (125 lb)  Body mass index is 19.58 kg/m².    Intake/Output Summary (Last 24 hours) at 11/3/2022 1426  Last data filed at 11/3/2022 0638  Gross per 24 hour   Intake 5232.83 ml   Output 1000 ml   Net 4232.83 ml        Physical Exam  Constitutional:       General: He is in acute distress.      Comments: Restless in restraints    Cardiovascular:      Rate and Rhythm: Normal rate and regular rhythm.      Heart sounds: No murmur heard.  Pulmonary:      Effort: Pulmonary effort is normal.      Breath sounds: Normal breath sounds. No wheezing.   Abdominal:      General: There is no distension.      Palpations: There is no mass.   Musculoskeletal:         General: Normal range of motion.   Skin:     General: Skin is warm and dry.   Neurological:      Mental Status: He is disoriented.      Comments: Not able to follow commands due to agitation    Psychiatric:      Comments: Yelling, screaming, cussing        Significant Labs: All pertinent labs within the past 24 hours have been reviewed.  Blood Culture:   No results for input(s): LABBLOO in the last 48 hours.    CBC:   Recent Labs   Lab 11/02/22  0453 11/03/22  0804   WBC 10.40 9.89   HGB 11.2* 11.2*   HCT 33.7* 34.3*    197       CMP:   Recent Labs   Lab 11/02/22  0452 11/03/22  0804    137   K 3.7 4.1    109   CO2 21* 22*   * 91   BUN 13 13   CREATININE 0.8 0.7   CALCIUM 8.7 8.8   PROT 5.6* 5.4*   ALBUMIN 2.4* 2.3*   BILITOT 0.4 0.7   ALKPHOS 83 71   AST 40 30   ALT 38 36   ANIONGAP 9 6*       Cardiac Markers: No results for input(s): CKMB, MYOGLOBIN, BNP, TROPISTAT in the last 48 hours.  Lactic Acid:   Recent Labs   Lab 11/02/22  0800   LACTATE 1.0       Lipase:   No results for input(s): LIPASE in the last 48 hours.    Lipid Panel: No results for input(s): CHOL,  HDL, LDLCALC, TRIG, CHOLHDL in the last 48 hours.  Magnesium:   Recent Labs   Lab 11/02/22  0452 11/03/22  0804   MG 1.9 1.8       POCT Glucose: No results for input(s): POCTGLUCOSE in the last 48 hours.  Troponin:   No results for input(s): TROPONINI, TROPONINIHS in the last 48 hours.    TSH:   Recent Labs   Lab 10/23/22  1415   TSH 0.976       Urine Studies:   No results for input(s): COLORU, APPEARANCEUA, PHUR, SPECGRAV, PROTEINUA, GLUCUA, KETONESU, BILIRUBINUA, OCCULTUA, NITRITE, UROBILINOGEN, LEUKOCYTESUR, RBCUA, WBCUA, BACTERIA, SQUAMEPITHEL, HYALINECASTS in the last 48 hours.    Invalid input(s): MIGUEL      Significant Imaging: I have reviewed all pertinent imaging results/findings within the past 24 hours.      Assessment/Plan:      * Dementia with behavioral disturbance  Sharp change in status. Possibly infectious or metabolic   CT of abdomen and pelvis - left kidney mass vs complex cyst, non obstructing stone   IVF   Neurology consulted - patient unable to do tele psych visit at this time   Psychiatry consult placed - patient cannot participate in current state - possible medication recommendation for FTD as to cause NMS in future   [facility]   [facility]urology recs: - resume home meds- if able to follow commands and swallow properly   Restraints if danger to himself  Tele monitoring     Head CT and brain MRi reviewed      NMS (neuroleptic malignant syndrome)  Working diagnosis in the setting of fever, muscle rigidity, AMS and exposure to multiple anti-psychotics   Infection workup in process   CSF culture negative, arbovirus panel pending   Dantrolene IV Q8 hours  Cannot take bromocriptine due to NPO/menta status       Moderate malnutrition  Nutrition consulted. Most recent weight and BMI monitored-          Malnutrition (Moderate to Severe)  Energy Intake (Malnutrition): less than 75% for greater than or equal to 1 month    Final Summary  Subcutaneous Fat Loss (Final Summary): mild  protein-calorie malnutrition  Muscle Loss Evaluation (Final Summary): mild protein-calorie malnutrition         Measurements:  Wt Readings from Last 1 Encounters:   11/01/22 56.7 kg (125 lb)   Body mass index is 19.58 kg/m².    Recommendations: Recommendation/Intervention: 1) Start TPN via PICC when placed: D15 AA 5 @ 70 ml/hr + standard lipids ( provides 84 g protein ( 100% EPN), 1643 kcal ( 100% EEN)) 2) If pt remains NPO and NGT placement become possible or PEG needed longterm rec TF: Peptamen 1.5 Prebio @ 20 ml/hr advancing to goal of 45 ml/hr + 130 ml flush q 4 hr  ( provides 1620 kcal ( 100% EEN), 73 g protein ( 100% EPN),and 831 ml free water) 3) If AMS improves and able to advance diet rec. regular, texure per SLP to encourage PO intakes 4) weigh weekly  Goals: 1) Nutrition support meeting > 75% of needs at f/u    Patient has been screened and assessed by RD. RD will follow patient.    TPN until patient is alert for oral diet vs peg tube placement       Goals of care, counseling/discussion  Spouse decided on DNR   Appreciate palliative care meeting with family        Fever  CXR:  PICC placed in good position.  1.5 cm density projecting over the right mid lung feel likely represents overlapping shadows but follow-up chest x-rays are recommended to assure resolution and absence of a pulmonary lesion  CTA head and neck :  4. Right upper lobe ground-glass opacities in patchy consolidation, nonspecific and may reflect sequelae of small airways inflammation or infection.     UA reflex to culture - NTD  Blood culture- NTD  CSF - negative      Ampicillin, acyclovir, rocephin and vancomycin - dc'd  Resp did deep suction - will send for culture - pending species Staph  and covered with Unasyn       Bandemia  Leukocytosis - unknown etiology  LA 1.2 and no fever  UA - no signs of infections  CXR - clear   CT of abdomen  Trend CBC - WBC normal range and no bandemia on repeat - with IVF      Frontotemporal  dementia  Follows neurologist in [place] with recent increases in meds - zyprexa 2.5mg to 10mg BID + Prozac and trileptal   Trileptal level <1   Psychiatry consult - unlikely to participate in current mental state but will need some direction to appropriate regimen as for this to not happen again - possible NMS ?      Postoperative hypothyroidism  Resume synthroid - IV due to NPo status   FT4 - normal       Hypernatremia    Resolved with IVF        Failure to thrive in adult  TPN for nutrition   Progression of dementia  Palliative care discussion - DNR  If he does not recover neurologically, spouse will need to make decision regarding feeding tube vs hospice        essential hypertension  If passes swallow eval, resume home norvasc  IV labetalol PRN         VTE Risk Mitigation (From admission, onward)         Ordered     enoxaparin injection 40 mg  Daily         10/29/22 1513     IP VTE HIGH RISK PATIENT  Once         10/29/22 1513     Place sequential compression device  Until discontinued         10/29/22 1513                Discharge Planning   TAYLOR: 11/4/2022     Code Status: DNR   Is the patient medically ready for discharge?:     Reason for patient still in hospital (select all that apply): Patient trending condition  Discharge Plan A: Psychiatric hospital                  Chelo Gibbs MD  Department of Hospital Medicine   [facility]

## 2022-11-03 NOTE — SUBJECTIVE & OBJECTIVE
Interval History: pt agitated in restraints but not as tightly contracted.  Family at bedside.    Review of Systems   Unable to perform ROS: Psychiatric disorder   Objective:     Vital Signs (Most Recent):  Temp: 99.5 °F (37.5 °C) (11/03/22 1110)  Pulse: 73 (11/03/22 1110)  Resp: 17 (11/03/22 1110)  BP: 122/72 (11/03/22 1110)  SpO2: 95 % (11/03/22 1110) Vital Signs (24h Range):  Temp:  [97.5 °F (36.4 °C)-100 °F (37.8 °C)] 99.5 °F (37.5 °C)  Pulse:  [60-89] 73  Resp:  [17-20] 17  SpO2:  [95 %-99 %] 95 %  BP: (122-176)/(66-91) 122/72     Weight: 56.7 kg (125 lb)  Body mass index is 19.58 kg/m².    Intake/Output Summary (Last 24 hours) at 11/3/2022 1426  Last data filed at 11/3/2022 0638  Gross per 24 hour   Intake 5232.83 ml   Output 1000 ml   Net 4232.83 ml        Physical Exam  Constitutional:       General: He is in acute distress.      Comments: Restless in restraints    Cardiovascular:      Rate and Rhythm: Normal rate and regular rhythm.      Heart sounds: No murmur heard.  Pulmonary:      Effort: Pulmonary effort is normal.      Breath sounds: Normal breath sounds. No wheezing.   Abdominal:      General: There is no distension.      Palpations: There is no mass.   Musculoskeletal:         General: Normal range of motion.   Skin:     General: Skin is warm and dry.   Neurological:      Mental Status: He is disoriented.      Comments: Not able to follow commands due to agitation    Psychiatric:      Comments: Yelling, screaming, cussing        Significant Labs: All pertinent labs within the past 24 hours have been reviewed.  Blood Culture:   No results for input(s): LABBLOO in the last 48 hours.    CBC:   Recent Labs   Lab 11/02/22 0453 11/03/22  0804   WBC 10.40 9.89   HGB 11.2* 11.2*   HCT 33.7* 34.3*    197       CMP:   Recent Labs   Lab 11/02/22 0452 11/03/22  0804    137   K 3.7 4.1    109   CO2 21* 22*   * 91   BUN 13 13   CREATININE 0.8 0.7   CALCIUM 8.7 8.8   PROT 5.6* 5.4*    ALBUMIN 2.4* 2.3*   BILITOT 0.4 0.7   ALKPHOS 83 71   AST 40 30   ALT 38 36   ANIONGAP 9 6*       Cardiac Markers: No results for input(s): CKMB, MYOGLOBIN, BNP, TROPISTAT in the last 48 hours.  Lactic Acid:   Recent Labs   Lab 11/02/22  0800   LACTATE 1.0       Lipase:   No results for input(s): LIPASE in the last 48 hours.    Lipid Panel: No results for input(s): CHOL, HDL, LDLCALC, TRIG, CHOLHDL in the last 48 hours.  Magnesium:   Recent Labs   Lab 11/02/22  0452 11/03/22  0804   MG 1.9 1.8       POCT Glucose: No results for input(s): POCTGLUCOSE in the last 48 hours.  Troponin:   No results for input(s): TROPONINI, TROPONINIHS in the last 48 hours.    TSH:   Recent Labs   Lab 10/23/22  1415   TSH 0.976       Urine Studies:   No results for input(s): COLORU, APPEARANCEUA, PHUR, SPECGRAV, PROTEINUA, GLUCUA, KETONESU, BILIRUBINUA, OCCULTUA, NITRITE, UROBILINOGEN, LEUKOCYTESUR, RBCUA, WBCUA, BACTERIA, SQUAMEPITHEL, HYALINECASTS in the last 48 hours.    Invalid input(s): MIGUEL      Significant Imaging: I have reviewed all pertinent imaging results/findings within the past 24 hours.

## 2022-11-03 NOTE — CARE UPDATE
11/03/22 0734   Patient Assessment/Suction   Level of Consciousness (AVPU) responds to voice   All Lung Fields Breath Sounds coarse   Cough Frequency infrequent   Suction Method nasal;oral   $ Suction Charges NT Suction Procedure   Secretions Amount moderate   Secretions Color yellow;pale;creamy   Secretions Characteristics thick   PRE-TX-O2   O2 Device (Oxygen Therapy) nasal cannula   $ Is the patient on Low Flow Oxygen? Yes   Flow (L/min) 2   SpO2 98 %   Pulse Oximetry Type Intermittent   $ Pulse Oximetry - Multiple Charge Pulse Oximetry - Multiple

## 2022-11-03 NOTE — ASSESSMENT & PLAN NOTE
Sharp change in status. Possibly infectious or metabolic   CT of abdomen and pelvis - left kidney mass vs complex cyst, non obstructing stone   IVF   Neurology consulted - patient unable to do tele psych visit at this time   Psychiatry consult placed - patient cannot participate in current state - possible medication recommendation for FTD as to cause NMS in future   [facility]   [facility] recs: - resume home meds- if able to follow commands and swallow properly   Restraints if danger to himself  Tele monitoring     Head CT and brain MRi reviewed

## 2022-11-03 NOTE — PROGRESS NOTES
[facility]  Progress Note  Date: 2022 9:36 AM            Patient Name: Ronn Caballero Jr.   MRN: [id number]   : 1950    AGE: 72 y.o.    LOS: 5 days Hospital Day: 6  Admit date: 10/29/2022 10:53 AM         HPI per EMR: Ronn Caballero Jr. is a 71 yo male with h/o thyroid cancer, frontal temporal lobe dementia, HTN, HLP, depression and anxiety transferred from [facility] due to 6 days of failure to thrive. It is reported from the facility that he was PEC/CEC (expires 11/3/22) due to combative behavior at home. History comes from the patient's spouse and sister-in -law at bedside. Patient follows a neurologist in [place] and spouse reports recent change in medications include increase of zyprexa from 2.5mg to 10mg BID. He was also started on gabapentin TID. Spouse denies recent fever, chills, cough, D/N/V.  Per the MAR from [facility] he received Haldol x4 IM, benadryl and ativan IV x2. The facility reports he was not eating or drinking and no witnessed episodes of diarrhea or vomiting. On our workup he has Na153, elevated  AST, hematuria, WBC 17k with 2% bands, lactic acid 1.2. No source of infection on UA or chest xray. On exam he does show discomfort in RUQ and midepigastrium. Abd CT and lipase pending. Troponin mildly elevated 0.056 with some EKG changes.      Hospital medicine consulted for further medical management. Physical restraints will be used to protect patient as he is pulling at cardiac leads and peripheral IV. Neurology consulted. Needs tele sitter.     10/31/2022:  Patient was seen and examined by me this morning.  He is encephalopathic and only able to tell me his name on repeated questioning.  Patient's wife was at bedside and states that he has a history of frontotemporal dementia however last  he suddenly became altered and combative for which he was taken to the hospital and then to [facility].    His CK  has been elevated and he has been encephalopathic and febrile.     11/01/2022:  Patient was seen examined by me this morning.  He remains to be somnolent and exam unchanged.    11/02/2022: Patient was seen and examined by me. He is somnolent and does open eyes to stimulus and does not follow commands or answer questions.  Patient's wife is at bedside and I discussed plan of care.  Patient has been febrile overnight.  Lumbar puncture was done yesterday and showed WBC 4, protein 32, glucose 62    11/3:  Patient was seen examined by me this morning.  He is obtunded, response to noxious stimulus however does not follow commands or answer questions.  He has rhythmic movements in his lower extremities which are apparently new.         Vitals:  Patient Vitals for the past 24 hrs:   BP Temp Temp src Pulse Resp SpO2   11/03/22 1110 122/72 99.5 °F (37.5 °C) Axillary 73 17 95 %   11/03/22 0734 -- -- -- -- -- 98 %   11/03/22 0712 (!) 151/71 97.5 °F (36.4 °C) Axillary 60 17 98 %   11/03/22 0425 135/81 98.9 °F (37.2 °C) -- 63 20 98 %   11/03/22 0353 -- -- -- 67 20 97 %   11/03/22 0018 (!) 148/66 99.6 °F (37.6 °C) -- 89 20 97 %   11/02/22 2324 -- -- -- 76 20 98 %   11/02/22 2009 (!) 176/87 100 °F (37.8 °C) -- 73 20 99 %   11/02/22 1924 -- -- -- 78 20 96 %   11/02/22 1602 (!) 135/91 99.2 °F (37.3 °C) Oral 84 17 95 %     PHYSICAL EXAM:     GENERAL APPEARANCE:  Patient is obtunded, does not answer to questions or follow commands.    HEENT: Normocephalic and atraumatic. PERRL. Oropharynx unremarkable.  PULM: Comfortable on room air.  CV: RRR.  ABDOMEN: Soft, nontender.  EXTREMITIES: No signs of vascular compromise. Pulses present. No cyanosis, clubbing or edema.  SKIN: Clear; no rashes, lesions or skin breaks in exposed areas.      NEURO:   MENTAL STATUS: Patient is obtunded, does not answer to questions or follow commands.    CRANIAL NERVES II-XII:  Face is grossly symmetric.  Unable to assess any other cranial nerves due to his  mental status  MOTOR: Normal bulk.  Tone is normal in upper and lower extremities bilaterally.    REFLEXES: DTRs 2+; normal and symmetric throughout.   SENSATION: Sensation cannot be tested  COORDINATION:  Cannot be tested  STATION: [place] deferred.  GAIT: Deferred.    CURRENT SCHEDULED MEDICATIONS:   ampicillin-sulbactim (UNASYN) IVPB  3 g Intravenous Q6H    bromocriptine  2.5 mg Oral Daily    dantrolene (DANTRIUM) IVPB  1 mg/kg Intravenous Q6H    enoxaparin  40 mg Subcutaneous Daily    fat emulsion 20%  250 mL Intravenous Daily    levothyroxine  44 mcg Intravenous Daily    lorazepam  1 mg Intravenous QHS    scopolamine  1 patch Transdermal Q3 Days    senna-docusate 8.6-50 mg  1 tablet Oral BID    sodium chloride 0.9%  10 mL Intravenous Q6H    valproate sodium (DEPACON) IVPB  250 mg Intravenous Q8H     CURRENT INFUSIONS:   Amino acid 5% - dextrose 15% (CLINIMIX-E) solution with additives.  CENTRAL LINE ONLY (1395 mOsm/L) 70 mL/hr at 11/03/22 1339    lactated ringers 125 mL/hr at 11/03/22 1256     DATA:  Recent Labs   Lab 10/29/22  1121 10/30/22  0428 10/31/22  0440 11/01/22  0447 11/02/22  0452 11/03/22  0804   * 150* 149* 144 140 137   K 4.3 3.3* 3.2* 2.9* 3.7 4.1   * 120* 120* 112* 110 109   CO2 24 22* 22* 22* 21* 22*   BUN 42* 30* 19 14 13 13   CREATININE 1.2 0.9 0.8 0.9 0.8 0.7    112* 105 121* 124* 91   CALCIUM 10.7* 9.0 9.1 8.7 8.7 8.8   PHOS  --  2.0* 2.5* 1.7* 2.9 2.5*   MG  --  2.1 2.0 1.8 1.9 1.8   * 108* 89* 56* 40 30   ALT 42 44 48* 42 38 36     Recent Labs   Lab 10/29/22  1121 10/30/22  0428 10/31/22  0440 11/01/22 0447 11/02/22  0453 11/03/22  0804   WBC 17.42* 9.24 7.73 9.89 10.40 9.89   HGB 13.7* 11.0* 11.5* 11.3* 11.2* 11.2*   HCT 41.9 33.4* 34.4* 33.7* 33.7* 34.3*    217 221 224 228 197     Lab Results   Component Value Date    PROTEINCSF 32 11/01/2022    GLUCCSF 62 11/01/2022     Hemoglobin A1c   Date Value Ref Range Status   05/09/2018 5.6 4.2 - 6.3 % Final             I have personally reviewed and interpreted the pertinent imaging and lab results.  Imaging Results               CT Abdomen Pelvis  Without Contrast (Final result)  Result time 10/29/22 16:26:14      Final result by Luana Horner MD (10/29/22 16:26:14)                   Impression:      Study limited by motion, demonstrating a complicated cyst or solid mass extending from the lower pole of the left kidney.  This should be worked up further with a study with IV contrast when the patient is able to remain still.    Nonobstructing right renal calculus.    Constipation.    This report was flagged in Epic as abnormal.      Electronically signed by: Luana Horner  Date:    10/29/2022  Time:    16:26               Narrative:    EXAMINATION:  CT ABDOMEN PELVIS WITHOUT CONTRAST    CLINICAL HISTORY:  Abdominal abscess/infection suspected; failure to thrive    TECHNIQUE:  Low dose axial images, sagittal and coronal reformations were obtained from the lung bases to the pubic symphysis.  Neither oral nor IV contrast was administered.  The patient had a difficult time remaining still for image acquisition    COMPARISON:  None    FINDINGS:  Consolidation in the dependent portions of the lung bases is likely due to atelectasis.    Limited noncontrast images of the liver, gallbladder, spleen, adrenal glands, pancreas are grossly normal.  There is a nonobstructing 5 mm right lower pole renal calculus.  There is a 3.2 x 3.5 cm structure with a density of 46 Hounsfield units extending from the lower pole of the left kidney series 4, image 99.  This may be a complex cyst or solid mass.  No adenopathy is noted.    The aorta is normal in caliber.    There is generalized fecal stasis throughout the colon.  No evidence of a bowel obstruction.  Urinary bladder unremarkable.  There is no free intraperitoneal fluid or air.  No acute findings in the bones.                                       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  This is likely an incidental finding.  It produces no mass effect/edema on the adjacent brain.  Otherwise, essentially negative study, aside from atrophy.      Electronically signed by: Luana Horner  Date:    10/29/2022  Time:    12:01               Narrative:    EXAMINATION:  CT HEAD WITHOUT CONTRAST    CLINICAL HISTORY:  Mental status change, unknown cause;    TECHNIQUE:  Low dose axial images were obtained through the head.  Coronal and sagittal reformations were also performed. Contrast was not administered.    COMPARISON:  None    FINDINGS:  The study is mildly limited by motion.  There is no intra or extra-axial hemorrhage.  No mass effect, edema or midline shift is present.  Mild generalized atrophy, particularly central atrophy is noted.  There is no skull fracture.  The visualized paranasal sinuses are clear.    There is 1.6 cm calcified mass in the floor of the right anterior cranial fossa, extending from the petrous temporal bone.  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  There is no edema in the adjacent temporal lobe.                                       X-Ray Chest AP Portable (Final result)  Result time 10/29/22 11:35:49      Final result by Luana Horner MD (10/29/22 11:35:49)                   Impression:      No acute cardiopulmonary disease.      Electronically signed by: Luana Horner  Date:    10/29/2022  Time:    11:35               Narrative:    EXAMINATION:  XR CHEST AP PORTABLE    CLINICAL HISTORY:  Altered mental status, unspecified    TECHNIQUE:  Single frontal view of the chest was performed.    COMPARISON:  05/28/2018    FINDINGS:  The patient is mildly rotated to the right.  The heart size is normal.  The aorta is  ectatic.  The lungs are clear.  There is no pleural effusion or acute bony abnormality.                                              ASSESSMENT AND PLAN:    Encephalopathy    Etiology of encephalopathy could be multifactorial.  Patient has been febrile and there is concern for infectious encephalopathy versus worsening dementia versus polypharmacy. Also in the differential is NMS vs Serotonin syndrome(less likely with no significant hyperreflexia) as CK has been elevated(tone was not able to be assessed) and been febrile.  Repeat EEG:  Moderate encephalopathy without seizures or epileptiform activity.  Etiology of his lower extremity movements likely dyskinesias to less likely to be seizures.  Recommended Ativan.  Continue IV fluids and scheduled ativan  Patient is on Trileptal at home which is likely for mood.  Patient does not have any history of seizures as per his wife.  If cannot take p.o. medication, recommend switching it to valproic acid 250 mg t.i.d. for mood  CSF results WBC 4, protein 32, glucose 62.  Concern for intracranial infection is low.    Continue Dantrolene(slow taper off in the next few days), ativan for possible NMS.   Continue IV fluids and trend CK  Hold Zyprexa and Fluoxetine. Appreciate psych recs  ID on board. Concern for aspiration PNA and being treated with antibiotics.  Currently on Unasyn  DVT prophylaxis  Will follow          38 minutes of care time has been spent evaluating with the patient. Time includes chart review not limited to diagnostic imaging, labs, and vitals, patient assessment, discussion with family and nursing, current order evaluations, and new order entries.      Lobito Calle MD  Neurology/vascular Neurology  Date of Service: 11/03/2022  9:36 AM    Please note: This note was transcribed using voice recognition software. Because of this technology there are often uinintended grammatical, spelling, and other transcription errors. Please disregard these errors.

## 2022-11-03 NOTE — PROCEDURES
EEG    Date/Time: 10/29/2022 10:53 AM  Performed by: Julio Katz MD  Authorized by: Lobito Calle MD     ELECTROENCEPHALOGRAM REPORT    DATE OF SERVICE: 11/3/22  EEG NUMBER:   REQUESTED BY: [place]  LOCATION OF SERVICE: [facility]   Electroencephalographic (EEG) recording is with electrodes placed according to the International 10-20 placement system.  Thirty two (32) channels of digital signal (sampling rate of 512/sec) including T1 and T2 was simultaneously recorded from the scalp and may include  EKG, EMG, and/or eye monitors.  Recording band pass was 0.1 to 512 hz.  Digital video recording of the patient is simultaneously recorded with the EEG.  The patient is instructed report clinical symptoms which may occur during the recording session.  EEG and video recording is stored and archived in digital format. Activation procedures which include photic stimulation, hyperventilation and instructing patients to perform simple task are done in selected patients.    The EEG is displayed on a monitor screen and can be reviewed using different montages.  Computer assisted analysis is employed to detect spike and electrographic seizure activity.   The entire record is submitted for computer analysis.  The entire recording is visually reviewed and the times identified by computer analysis as being spikes or seizures are reviewed again.  Compresses spectral analysis (CSA) is also performed on the activity recorded from each individual channel.  This is displayed as a power display of frequencies from 0 to 30 Hz over time.   The CSA is reviewed looking for asymmetries in power between homologous areas of the scalp and then compared with the original EEG recording.     [employer] software was also utilized in the review of this study.  This software suite analyzes the EEG recording in multiple domains.  Coherence and rhythmicity is computed to identify EEG sections which may contain organized seizures.   Each channel undergoes analysis to detect presence of spike and sharp waves which have special and morphological characteristic of epileptic activity.  The routine EEG recording is converted from spacial into frequency domain.  This is then displayed comparing homologous areas to identify areas of significant asymmetry.  Algorithm to identify non-cortically generated artifact is used to separate eye movement, EMG and other artifact from the EEG    EEG FINDINGS  The record shows a fair  organization at rest, consisting of a 6  Hz posterior dominant rhythm with fair  reactivity. There is mild bilateral beta activity. There is continuous diffuse theta and delta range background slwoing.     Drowsiness is characterized by attenuation of the background, vertex waves, and bilateral theta slowing.    Provocative maneuvers including hyperventilation and photic stimulation were performed.     EKG recording shows a regular rhythm.    There is no push button or clinical event.    IMPRESSION:  Abnormal study due to moderate diffuse background slowing consistent with diffuse cerebral dysfunction and encephalopathy which may be on the basis of toxic, metabolic, or primary neuronal disorder.     Julio Katz MD

## 2022-11-03 NOTE — PROGRESS NOTES
Progress Note  Infectious Disease    Reason for Consult:  AMS + Fever rule out meningitis     HPI: Ronn Caballero Jr. is a 72 y.o. male with past medical history of thyroid cancer, frontal temporal dementia, hypertension, hyperlipidemia, depression/anxiety who was transferred from [facility] due to 6 days of failure to thrive.  History obtain from wife at bedside.  She states patient started acting aggressive 2 weeks ago, she had to call the ambulance and patient was placed at [facility].  Of note patient was taking Zyprexa 2.5 mg daily and dose was increased 10 mg twice a day.  He was also started on gabapentin 3 times a day.  Wife states she was not able to communicate with the facility for about a week until she called her daughter for assistance and was advised to bring the patient to the emergency room.  As per records, patient received Haldol x4, Benadryl, and Ativan x2.  Reports of not eating, not drinking noted.  No personal history of seizures.  She states patient has shuffled gait at baseline, last time she saw him at home he did not have cough, shortness of breath, nausea or vomiting, or any complaint before being transferred to psychiatric facility.    In the ER, patient hypertensive, afebrile   Labs on admission, leukocytosis 17.4, left shift 85%, bands 2%, H&H 13.7/41.9, platelet count 274   Hypernatremia 153  Creatinine 1.2   /ALT 42   CPK 3221   Lactic acid 1.2   Procalcitonin 0.3  UA with no pyuria, occasional bacteria, negative for UTI   Blood cultures x2 no growth to date, pending final   Chest x-ray clear   CT head with calcified mass in the floor of the right middle cranial fossa, most suggestive of petrous apex meningioma.  Incidental finding.  Essential negative study, aside from atrophy.    CT abdomen/pelvis limited by motion, cyst on left kidney.  Constipation.  Nonobstructive renal stone.    Hospital course complicated by fever of  101 10/30 and 102.5 today.   "ID consult to rule out meningitis. Empirically started on Vancomycin and Ceftriaxone IV.     INTERVAL HISTORY:  11/1: Interim reviewed, patient seen and examined at beside, remains somnolent, not following commands, gargling. Hypertensive, Tmax 102.3 overnight, currently afebrile. Discussed with neurology, will try empiric dantrolene, ordered MRI brain and attempt LP. Labs reviewed, WBC 9.8, left shift 75.4%, creatinie stable. Phsophorus 1.7, AST 56 trending down, ALT normal. Micro reviewed, no growth so far.     11/2:  Interim reviewed, patient seen examined at bedside.  Remains somnolent, not following commands.  Hypertensive, T-max 101.4° yesterday, currently afebrile.  Had LP performed yesterday, WBC 4, normal glucose and protein, negative for meningitis.  Discussed with respiratory therapy, copious amount of purulent secretions suctioned, specimen sent for culture.  Labs reviewed, white count 10.4, left shift 76%, H&H 11.2/33.7, platelet count 228.  Stable kidney function, albumin 2.4, normal LFTs.  CPK trending down 06/03/2005, lactic acid 1, procalcitonin 0.7, suspecting ongoing aspiration pneumonia.    11/3: Interim reviewed, patient seen and examined at bedside, wife and son present. Patient remains with his eyes closed, responding to verbal commands, "what, yes'. Hemodynamically stable, afebrile in the last 36h, tolerating Dantrolene.  Adequate urine output, Fernandes remains in place, passing gas, no bowel movements yet.  Labs reviewed, stable white count, no left shift, H&H 11.2/34.3, platelet count 197.  Chest x-ray clear.     Review of patient's allergies indicates:  No Known Allergies  Past Medical History:   Diagnosis Date    Cancer     Dementia     Depression     Hypertension      Past Surgical History:   Procedure Laterality Date    Lymph node Ca removed       Social History     Tobacco Use    Smoking status: Not on file    Smokeless tobacco: Not on file   Substance Use Topics    Alcohol use: Not on " file        No family history on file.    Pertinent medications noted: Zyprexa/haldol/Ativan    Review of Systems: Patient somnolent, unable to follow commands.     Outdoor activities: From home, recently admitted to [facility] for abnormal behavior in the setting of frontotemporal dementia.  Travel: None  Implants: None  Antibiotic History: Vanco/Ceftriaxone    EXAM & DIAGNOSTICS REVIEWED:   Vitals:     Temp:  [97.5 °F (36.4 °C)-100 °F (37.8 °C)]   Temp: 97.5 °F (36.4 °C) (11/03/22 0712)  Pulse: 60 (11/03/22 0712)  Resp: 17 (11/03/22 0712)  BP: (!) 151/71 (11/03/22 0712)  SpO2: 98 % (11/03/22 0734)    Intake/Output Summary (Last 24 hours) at 11/3/2022 1007  Last data filed at 11/3/2022 0638  Gross per 24 hour   Intake 5232.83 ml   Output 1900 ml   Net 3332.83 ml       General:  Flushing resolved, eyes shut, answering to verbal commands, O2 by Nc 2L  Eyes:  Eyes shut, unable to open  ENT:  Moist mucus membranes  Neck:  Supple   Lungs: Coarse breath sounds b/l  Heart:  S1/S2+, regular rhythm, no murmurs  Abd:  +BS, soft, non tender, non distended, no rebound  :  Voids  Musc:  Generalized muscle wastign, joints without effusion, swelling,  erythema, synovitis  Skin:  Warm, R arm with resolving redness on AC fossa, seems like prior infiltrated IV, scab noted, see pic below, dressing in place   Wound:   Neuro:  Mumbling, responding to partial verbal commands  Psych:    Lymphatic:       Extrem: No LE edema b/l  VAD:  Peripheral IV       Isolation: None    11/2:          11/1:        10/31:        General Labs reviewed:  Recent Labs   Lab 11/01/22 0447 11/02/22  0453 11/03/22  0804   WBC 9.89 10.40 9.89   HGB 11.3* 11.2* 11.2*   HCT 33.7* 33.7* 34.3*    228 197       Recent Labs   Lab 11/01/22 0447 11/02/22  0452 11/03/22  0804    140 137   K 2.9* 3.7 4.1   * 110 109   CO2 22* 21* 22*   BUN 14 13 13   CREATININE 0.9 0.8 0.7   CALCIUM 8.7 8.7 8.8   PROT 5.6* 5.6* 5.4*   BILITOT 0.7 0.4 0.7   ALKPHOS 79  83 71   ALT 42 38 36   AST 56* 40 30     No results for input(s): CRP in the last 168 hours.  No results for input(s): SEDRATE in the last 168 hours.    Estimated Creatinine Clearance: 78.8 mL/min (based on SCr of 0.7 mg/dL).     Micro:  Microbiology Results (last 7 days)       Procedure Component Value Units Date/Time    CSF culture [[id number]] Collected: 11/01/22 1513    Order Status: Completed Specimen: CSF (Spinal Fluid) from CSF Tap, Tube 2 Updated: 11/03/22 0728     CSF CULTURE No Growth to date     Gram Stain Result No WBC's, epithelial cells or organisms seen      11/01/2022  17:11 TN3    Blood culture (site 1) [[id number]] Collected: 10/29/22 1542    Order Status: Completed Specimen: Blood Updated: 11/03/22 0612     Blood Culture, Routine No Growth to date      No Growth to date      No Growth to date      No Growth to date      No Growth to date    Narrative:      Site # 1, aerobic and anaerobic    Blood culture (site 2) [[id number]] Collected: 10/29/22 1543    Order Status: Completed Specimen: Blood Updated: 11/03/22 0612     Blood Culture, Routine No Growth to date      No Growth to date      No Growth to date      No Growth to date      No Growth to date    Narrative:      Site # 2, aerobic only    Aerobic culture [[id number]] Collected: 11/02/22 1200    Order Status: Sent Specimen: Wound from Arm, Right Updated: 11/03/22 0144    Culture, Respiratory with Gram Stain [[id number]] Collected: 11/01/22 1744    Order Status: Completed Specimen: Respiratory from Tracheal Aspirate Updated: 11/02/22 0211     Gram Stain (Respiratory) Many WBC's     Gram Stain (Respiratory) Few Gram positive cocci     Gram Stain (Respiratory) Few Gram negative rods     Gram Stain (Respiratory) Rare Gram positive rods     Gram Stain (Respiratory) Rare budding yeast    Gram stain [[id number]] Collected: 11/01/22 1513    Order Status: Canceled Specimen: CSF (Spinal Fluid) from CSF Tap, Tube 2             Imaging Reviewed:  CXR  CT head    CT head/neck  CT abdomen/pelvis     MRI Brain:  1. Moderate to marked patient motion slightly limits evaluation but there is no acute infarction.  There is no acute intracranial hemorrhage or parenchymal mass.  There is generalized volume loss.  There is also mild nonspecific white matter change.   2. There is calcification in the posterolateral aspect of the right middle cranial fossa which is better demonstrated on comparison CT.  There is no definite associated enhancement as would be expected in a meningioma.  This could potentially represent very prominent incidental dural calcification or possible osteoma.  This is not acute.     Cardiology: EKG Qtc 496ms       IMPRESSION & PLAN     AMS + Fever, rule out meningitis vs NMS vs Serotoninergic syndrome in the setting of recent increased dose of Zyprexa and other neuroleptics, favoring the latter  Blood cultures x 2 no growth   LP negative for meningitis   Respiratory culture to be updated today, likely normal respiratory annetta  Wound cultures 11/1 in process     2. Aspiration pneumonia  Respiratory culture multiple organisms on G stain, pending final    Procal 0.7    3. R arm mild cellulitis, improving  4. Rhabdomyolysis, improving   5. Transaminitis, trending down  6. PMHx: thyroid cancer, frontal temporal dementia, hypertension, hyperlipidemia, depression/anxiety     Recommendations:  Follow cultures   Continue Unasyn 3g IV q6h   Neurology recommendations appreciated   Aspiration precautions  Oral hygiene  Incentive spirometry  Wound care to R arm  Pain & Palliative to discuss goals of care    Will follow     D/w Wife, son, nursing, Dr Gibbs     Medical Decision Making during this encounter was  [_] Low Complexity  [_] Moderate Complexity  [xx] High Complexity

## 2022-11-03 NOTE — ASSESSMENT & PLAN NOTE
TPN for nutrition   Progression of dementia  Palliative care discussion - DNR  If he does not recover neurologically, spouse will need to make decision regarding feeding tube vs hospice

## 2022-11-03 NOTE — PLAN OF CARE
Recommendations   1) Continue  TPN via PICC when placed: D15 AA 5 @ 70 ml/hr + standard lipids   ( provides 84 g protein ( 100% EPN), 1643 kcal ( 100% EEN))     2) If pt remains NPO and NGT placement become possible or PEG needed longterm rec TF:   Peptamen 1.5 Prebio @ 20 ml/hr advancing to goal of 45 ml/hr + 130 ml flush q 4 hr    ( provides 1620 kcal ( 100% EEN), 73 g protein ( 100% EPN),and 831 ml free water)     3) If AMS improves and able to advance diet rec. regular, texure per SLP to encourage PO intakes   4) weigh weekly, replete phos as needed  5) reduce other IVF if able while TPN infusing    Goals: 1) Nutrition support meeting > 75% of needs at f/u  Nutrition Goal Status: new  Communication of RD Recs: reviewed with physician (POC, sticky note, second sign)

## 2022-11-03 NOTE — ASSESSMENT & PLAN NOTE
CXR:  PICC placed in good position.  1.5 cm density projecting over the right mid lung feel likely represents overlapping shadows but follow-up chest x-rays are recommended to assure resolution and absence of a pulmonary lesion  CTA head and neck :  4. Right upper lobe ground-glass opacities in patchy consolidation, nonspecific and may reflect sequelae of small airways inflammation or infection.     UA reflex to culture - NTD  Blood culture- NTD  CSF - negative      Ampicillin, acyclovir, rocephin and vancomycin - dc'd  Resp did deep suction - will send for culture - pending species Staph  and covered with Unasyn

## 2022-11-03 NOTE — ASSESSMENT & PLAN NOTE
Nutrition consulted. Most recent weight and BMI monitored-          Malnutrition (Moderate to Severe)  Energy Intake (Malnutrition): less than 75% for greater than or equal to 1 month    Final Summary  Subcutaneous Fat Loss (Final Summary): mild protein-calorie malnutrition  Muscle Loss Evaluation (Final Summary): mild protein-calorie malnutrition         Measurements:  Wt Readings from Last 1 Encounters:   11/01/22 56.7 kg (125 lb)   Body mass index is 19.58 kg/m².    Recommendations: Recommendation/Intervention: 1) Start TPN via PICC when placed: D15 AA 5 @ 70 ml/hr + standard lipids ( provides 84 g protein ( 100% EPN), 1643 kcal ( 100% EEN)) 2) If pt remains NPO and NGT placement become possible or PEG needed longterm rec TF: Peptamen 1.5 Prebio @ 20 ml/hr advancing to goal of 45 ml/hr + 130 ml flush q 4 hr  ( provides 1620 kcal ( 100% EEN), 73 g protein ( 100% EPN),and 831 ml free water) 3) If AMS improves and able to advance diet rec. regular, texure per SLP to encourage PO intakes 4) weigh weekly  Goals: 1) Nutrition support meeting > 75% of needs at f/u    Patient has been screened and assessed by RD. RD will follow patient.    TPN until patient is alert for oral diet vs peg tube placement

## 2022-11-03 NOTE — ASSESSMENT & PLAN NOTE
Working diagnosis in the setting of fever, muscle rigidity, AMS and exposure to multiple anti-psychotics   Infection workup in process   CSF culture negative, arbovirus panel pending   Dantrolene IV Q8 hours  Cannot take bromocriptine due to NPO/menta status

## 2022-11-03 NOTE — CARE UPDATE
11/02/22 1924   Patient Assessment/Suction   Level of Consciousness (AVPU) responds to voice   Respiratory Effort Unlabored   Expansion/Accessory Muscles/Retractions no use of accessory muscles   All Lung Fields Breath Sounds Anterior:;Lateral:;coarse;rhonchi   Cough Frequency infrequent;with stimulation   Cough Type loose;congested   Suction Method nasal;tracheal   $ Suction Charges NT Suction Procedure   Secretions Amount copious   Secretions Color yellow;red   Secretions Characteristics thick   Skin Integrity   $ Wound Care Tech Time 15 min   Area Observed Nares   Skin Appearance redness blanchable   PRE-TX-O2   O2 Device (Oxygen Therapy) nasal cannula   $ Is the patient on Low Flow Oxygen? Yes   Flow (L/min) 2   SpO2 96 %   Pulse Oximetry Type Intermittent   $ Pulse Oximetry - Multiple Charge Pulse Oximetry - Multiple   Pulse 78   Resp 20        Nasopharyngeal Airway   Placement Date/Time: 11/02/22 0345   Tube Location: Right Nostril  Inserted By: RT  Insertion attempts (enter comment if more than 2 attempts): 1  Ease of Insertion: Atraumatic   Secured Location Right   Site Condition Cool;Dry

## 2022-11-03 NOTE — CONSULTS
[facility]  Adult Nutrition  Progress Note    SUMMARY     Recommendations   1) Continue  TPN via PICC when placed: D15 AA 5 @ 70 ml/hr + standard lipids   ( provides 84 g protein ( 100% EPN), 1643 kcal ( 100% EEN))     2) If pt remains NPO and NGT placement become possible or PEG needed longterm rec TF:   Peptamen 1.5 Prebio @ 20 ml/hr advancing to goal of 45 ml/hr + 130 ml flush q 4 hr    ( provides 1620 kcal ( 100% EEN), 73 g protein ( 100% EPN),and 831 ml free water)     3) If AMS improves and able to advance diet rec. regular, texure per SLP to encourage PO intakes   4) weigh weekly, replete phos as needed  5) reduce other IVF if able while TPN infusing    Goals: 1) Nutrition support meeting > 75% of needs at f/u  Nutrition Goal Status: new  Communication of RD Recs: reviewed with physician (POC, sticky note, second sign)    Assessment and Plan    Moderate malnutrition  Contributing Nutrition Diagnosis  Moderate chronic condition related malnutrition     Related to (etiology):   AMS     Signs and Symptoms (as evidenced by):   1) PO intakes < 75% of needs x > 1 month  2) mild wasting as charted below     Interventions:  Collaboration with other providers     Nutrition Diagnosis Status:   continues    Malnutrition Assessment     Skin (Micronutrient):  (Jamin = 11, abrasions of feet, buttocks, redness of upper arm)   Micronutrient Evaluation: suspected deficiency  Micronutrient Evaluation Comments: check iron, K, Phos   Energy Intake (Malnutrition): less than 75% for greater than or equal to 1 month   Orbital Region (Subcutaneous Fat Loss): mild depletion  Upper Arm Region (Subcutaneous Fat Loss): mild depletion  Thoracic and Lumbar Region: mild depletion   Lutheran Region (Muscle Loss): mild depletion  Clavicle Bone Region (Muscle Loss): mild depletion  Clavicle and Acromion Bone Region (Muscle Loss): mild depletion  Scapular Bone Region (Muscle Loss): mild depletion  Dorsal Hand (Muscle  "Loss): mild depletion  Patellar Region (Muscle Loss): mild depletion  Anterior Thigh Region (Muscle Loss): mild depletion   Edema (Fluid Accumulation): 0-->no edema present   Subcutaneous Fat Loss (Final Summary): mild protein-calorie malnutrition  Muscle Loss Evaluation (Final Summary): mild protein-calorie malnutrition         Reason for Assessment    Reason For Assessment: follow up  Diagnosis:  (dementia with behavioral disturbances)  Relevant Medical History: Head and neck CA, HTN, depression, dysphagia s/p PEG ( removed Nov. 2012), dementia  Interdisciplinary Rounds: attended    General Information Comments: 73 y/o male admitted with AMS, in restraints, confused and agitated. SLP rec. NPO, 3 days fo poor PO intakes. Decline in mental status, not alert enough to eat PO or follow commands for NG placement, also with difficulties s/p neck surgery- plan for PICC placement for TPN per MD. PTA pt was at [facility] and noted to have poor appetite, prior to this he was at home and ate well on a soft food diet. NFPE 11/1/22 mild wasting seen. No significant wt loss per chart review.  11/3/22 Pt is not responding to commands, not appropriate/alert enough for SLP eval x 3 days, remains NPO. TPN continues.    Nutrition Discharge Planning: To be determined- Cardiac, texture per SLP or TF above    Nutrition Risk Screen    Nutrition Risk Screen: tube feeding or parenteral nutrition, dysphagia or difficulty swallowing    Nutrition/Diet History    Patient Reported Diet/Restrictions/Preferences: soft  Spiritual, Cultural Beliefs, Evangelical Practices, Values that Affect Care: no  Food Allergies: NKFA  Factors Affecting Nutritional Intake: impaired cognitive status/motor control, difficulty/impaired swallowing, inability to feed self    Anthropometrics    Temp: 99.5 °F (37.5 °C)  Height Method: Measured  Height: 5' 7"  Height (inches): 67 in  Weight Method: Bed Scale  Weight: 56.7 kg (125 lb)  Weight (lb): 125 " lb  Ideal Body Weight (IBW), Male: 148 lb  % Ideal Body Weight, Male (lb): 84.46 %  BMI (Calculated): 19.6  BMI Grade: 18.5-24.9 - normal       Lab/Procedures/Meds    Pertinent Labs Reviewed: reviewed  BMP  Lab Results   Component Value Date     11/03/2022    K 4.1 11/03/2022     11/03/2022    CO2 22 (L) 11/03/2022    BUN 13 11/03/2022    CREATININE 0.7 11/03/2022    CALCIUM 8.8 11/03/2022    ANIONGAP 6 (L) 11/03/2022    EGFRNONAA >60 11/17/2020     Lab Results   Component Value Date    CALCIUM 8.8 11/03/2022    PHOS 2.5 (L) 11/03/2022     Lab Results   Component Value Date    ALBUMIN 2.3 (L) 11/03/2022     Pertinent Medications Reviewed: reviewed  Zofran, lactated ringers @ 125 ml/hr, senna, scopolamine    Estimated/Assessed Needs    Weight Used For Calorie Calculations: 56.7 kg (125 lb)  Energy Calorie Requirements (kcal): MSJ ( x 1.2-1.4) = 9851-6248 kcal  Energy Need Method: [place]  Protein Requirements: 1.2-1.4 g protein/kg ( age/wasting) = 68-79 g  Weight Used For Protein Calculations: 56.7 kg (125 lb)  Fluid Requirements (mL): 8854-5713 ml or per MD  Estimated Fluid Requirement Method: RDA Method  CHO Requirement: N/A      Nutrition Prescription Ordered    Current Diet Order: NPO x 3 days + TPN above    Evaluation of Received Nutrient/Fluid Intake    Energy Calories Required: meeting needs  Protein Required: meeting needs  Fluid Required: meeting needs  Total Fluid Intake (mL/kg): IVF @ 125 ml/hr + TPN @ 70 ml/hr  Tolerance: NPO, tolerating PN    Intake/Output Summary (Last 24 hours) at 11/3/2022 1350  Last data filed at 11/3/2022 0638  Gross per 24 hour   Intake 5232.83 ml   Output 1000 ml   Net 4232.83 ml       % Intake of Estimated Energy Needs: 100%  % Meal Intake: NPO + TPN    Nutrition Risk    Level of Risk/Frequency of Follow-up: moderate 2 x weekly      Monitor and Evaluation    Food and Nutrient Intake: energy intake  Food and Nutrient Adminstration: diet order, enteral and  parenteral nutrition administration  Anthropometric Measurements: weight  Biochemical Data, Medical Tests and Procedures: electrolyte and renal panel, gastrointestinal profile, glucose/endocrine profile  Nutrition-Focused Physical Findings: overall appearance       Nutrition Follow-Up    RD Follow-up?: Yes

## 2022-11-04 LAB
BACTERIA BLD CULT: NORMAL
BACTERIA BLD CULT: NORMAL
HSV1, PCR, CSF: NEGATIVE
HSV2, PCR, CSF: NEGATIVE
PROCALCITONIN SERPL IA-MCNC: 0.24 NG/ML
VDRL CSF QL: NEGATIVE

## 2022-11-04 PROCEDURE — 63600175 PHARM REV CODE 636 W HCPCS: Performed by: HOSPITALIST

## 2022-11-04 PROCEDURE — A4217 STERILE WATER/SALINE, 500 ML: HCPCS | Performed by: INTERNAL MEDICINE

## 2022-11-04 PROCEDURE — 84145 PROCALCITONIN (PCT): CPT | Performed by: STUDENT IN AN ORGANIZED HEALTH CARE EDUCATION/TRAINING PROGRAM

## 2022-11-04 PROCEDURE — 31720 CLEARANCE OF AIRWAYS: CPT

## 2022-11-04 PROCEDURE — 25000003 PHARM REV CODE 250: Performed by: STUDENT IN AN ORGANIZED HEALTH CARE EDUCATION/TRAINING PROGRAM

## 2022-11-04 PROCEDURE — 99233 PR SUBSEQUENT HOSPITAL CARE,LEVL III: ICD-10-PCS | Mod: ,,, | Performed by: INTERNAL MEDICINE

## 2022-11-04 PROCEDURE — 63600175 PHARM REV CODE 636 W HCPCS: Performed by: INTERNAL MEDICINE

## 2022-11-04 PROCEDURE — 99233 SBSQ HOSP IP/OBS HIGH 50: CPT | Mod: S$GLB,,, | Performed by: STUDENT IN AN ORGANIZED HEALTH CARE EDUCATION/TRAINING PROGRAM

## 2022-11-04 PROCEDURE — 25000003 PHARM REV CODE 250: Performed by: INTERNAL MEDICINE

## 2022-11-04 PROCEDURE — 63600175 PHARM REV CODE 636 W HCPCS: Performed by: STUDENT IN AN ORGANIZED HEALTH CARE EDUCATION/TRAINING PROGRAM

## 2022-11-04 PROCEDURE — 99233 PR SUBSEQUENT HOSPITAL CARE,LEVL III: ICD-10-PCS | Mod: S$GLB,,, | Performed by: STUDENT IN AN ORGANIZED HEALTH CARE EDUCATION/TRAINING PROGRAM

## 2022-11-04 PROCEDURE — 25000003 PHARM REV CODE 250: Performed by: HOSPITALIST

## 2022-11-04 PROCEDURE — 94761 N-INVAS EAR/PLS OXIMETRY MLT: CPT

## 2022-11-04 PROCEDURE — A4216 STERILE WATER/SALINE, 10 ML: HCPCS | Performed by: HOSPITALIST

## 2022-11-04 PROCEDURE — 99233 SBSQ HOSP IP/OBS HIGH 50: CPT | Mod: ,,, | Performed by: INTERNAL MEDICINE

## 2022-11-04 PROCEDURE — 36415 COLL VENOUS BLD VENIPUNCTURE: CPT | Performed by: STUDENT IN AN ORGANIZED HEALTH CARE EDUCATION/TRAINING PROGRAM

## 2022-11-04 PROCEDURE — B4185 PARENTERAL SOL 10 GM LIPIDS: HCPCS | Performed by: STUDENT IN AN ORGANIZED HEALTH CARE EDUCATION/TRAINING PROGRAM

## 2022-11-04 PROCEDURE — 27000221 HC OXYGEN, UP TO 24 HOURS

## 2022-11-04 PROCEDURE — 12000002 HC ACUTE/MED SURGE SEMI-PRIVATE ROOM

## 2022-11-04 RX ADMIN — ENOXAPARIN SODIUM 40 MG: 100 INJECTION SUBCUTANEOUS at 04:11

## 2022-11-04 RX ADMIN — DANTROLENE SODIUM 55 MG: 20 INJECTION INTRAVENOUS at 07:11

## 2022-11-04 RX ADMIN — SODIUM CHLORIDE, PRESERVATIVE FREE 10 ML: 5 INJECTION INTRAVENOUS at 12:11

## 2022-11-04 RX ADMIN — DEXTROSE 250 MG: 50 INJECTION, SOLUTION INTRAVENOUS at 04:11

## 2022-11-04 RX ADMIN — ASCORBIC ACID, VITAMIN A PALMITATE, CHOLECALCIFEROL, THIAMINE HYDROCHLORIDE, RIBOFLAVIN-5 PHOSPHATE SODIUM, PYRIDOXINE HYDROCHLORIDE, NIACINAMIDE, DEXPANTHENOL, ALPHA-TOCOPHEROL ACETATE, VITAMIN K1, FOLIC ACID, BIOTIN, CYANOCOBALAMIN: 200; 3300; 200; 6; 3.6; 6; 40; 15; 10; 150; 600; 60; 5 INJECTION, SOLUTION INTRAVENOUS at 05:11

## 2022-11-04 RX ADMIN — I.V. FAT EMULSION 250 ML: 20 EMULSION INTRAVENOUS at 10:11

## 2022-11-04 RX ADMIN — VANCOMYCIN HYDROCHLORIDE 1250 MG: 1.25 INJECTION, POWDER, LYOPHILIZED, FOR SOLUTION INTRAVENOUS at 11:11

## 2022-11-04 RX ADMIN — AMPICILLIN SODIUM AND SULBACTAM SODIUM 3 G: 2; 1 INJECTION, POWDER, FOR SOLUTION INTRAMUSCULAR; INTRAVENOUS at 04:11

## 2022-11-04 RX ADMIN — DEXTROSE 250 MG: 50 INJECTION, SOLUTION INTRAVENOUS at 01:11

## 2022-11-04 RX ADMIN — AMPICILLIN SODIUM AND SULBACTAM SODIUM 3 G: 2; 1 INJECTION, POWDER, FOR SOLUTION INTRAMUSCULAR; INTRAVENOUS at 08:11

## 2022-11-04 RX ADMIN — DANTROLENE SODIUM 55 MG: 20 INJECTION INTRAVENOUS at 04:11

## 2022-11-04 RX ADMIN — LEVOTHYROXINE SODIUM 44 MCG: 20 INJECTION, SOLUTION INTRAVENOUS at 09:11

## 2022-11-04 RX ADMIN — DEXTROSE 250 MG: 50 INJECTION, SOLUTION INTRAVENOUS at 09:11

## 2022-11-04 RX ADMIN — SODIUM CHLORIDE, SODIUM LACTATE, POTASSIUM CHLORIDE, AND CALCIUM CHLORIDE: .6; .31; .03; .02 INJECTION, SOLUTION INTRAVENOUS at 07:11

## 2022-11-04 RX ADMIN — AMPICILLIN SODIUM AND SULBACTAM SODIUM 3 G: 2; 1 INJECTION, POWDER, FOR SOLUTION INTRAMUSCULAR; INTRAVENOUS at 03:11

## 2022-11-04 RX ADMIN — LORAZEPAM 1 MG: 2 INJECTION INTRAMUSCULAR; INTRAVENOUS at 09:11

## 2022-11-04 NOTE — PROGRESS NOTES
[facility]  Progress Note  Date: 2022 9:36 AM            Patient Name: Ronn Caballero Jr.   MRN: [id number]   : 1950    AGE: 72 y.o.    LOS: 6 days Hospital Day: 7  Admit date: 10/29/2022 10:53 AM         HPI per EMR: Ronn Caballero Jr. is a 73 yo male with h/o thyroid cancer, frontal temporal lobe dementia, HTN, HLP, depression and anxiety transferred from [facility] due to 6 days of failure to thrive. It is reported from the facility that he was PEC/CEC (expires 11/3/22) due to combative behavior at home. History comes from the patient's spouse and sister-in -law at bedside. Patient follows a neurologist in [place] and spouse reports recent change in medications include increase of zyprexa from 2.5mg to 10mg BID. He was also started on gabapentin TID. Spouse denies recent fever, chills, cough, D/N/V.  Per the MAR from [place] he received Haldol x4 IM, benadryl and ativan IV x2. The facility reports he was not eating or drinking and no witnessed episodes of diarrhea or vomiting. On our workup he has Na153, elevated  AST, hematuria, WBC 17k with 2% bands, lactic acid 1.2. No source of infection on UA or chest xray. On exam he does show discomfort in RUQ and midepigastrium. Abd CT and lipase pending. Troponin mildly elevated 0.056 with some EKG changes.      Hospital medicine consulted for further medical management. Physical restraints will be used to protect patient as he is pulling at cardiac leads and peripheral IV. Neurology consulted. Needs tele sitter.     10/31/2022:  Patient was seen and examined by me this morning.  He is encephalopathic and only able to tell me his name on repeated questioning.  Patient's wife was at bedside and states that he has a history of frontotemporal dementia however last  he suddenly became altered and combative for which he was taken to the hospital and then to [facility].    His CK  has been elevated and he has been encephalopathic and febrile.     11/01/2022:  Patient was seen examined by me this morning.  He remains to be somnolent and exam unchanged.    11/02/2022: Patient was seen and examined by me. He is somnolent and does open eyes to stimulus and does not follow commands or answer questions.  Patient's wife is at bedside and I discussed plan of care.  Patient has been febrile overnight.  Lumbar puncture was done yesterday and showed WBC 4, protein 32, glucose 62    11/3:  Patient was seen examined by me this morning.  He is obtunded, response to noxious stimulus however does not follow commands or answer questions.  He has rhythmic movements in his lower extremities which are apparently new.    11/4: Patient was seen and examined by me. He remains to be obtunded. Has been afebrile.         Vitals:  Patient Vitals for the past 24 hrs:   BP Temp Temp src Pulse Resp SpO2   11/04/22 0740 (!) 143/68 97.8 °F (36.6 °C) Oral 64 18 97 %   11/04/22 0400 (!) 143/72 99.2 °F (37.3 °C) -- 62 18 98 %   11/04/22 0104 -- -- -- -- -- 99 %   11/03/22 2330 (!) 144/69 98.8 °F (37.1 °C) -- 61 18 99 %   11/03/22 2036 -- -- -- -- -- 98 %   11/1950 (!) 184/77 100.2 °F (37.9 °C) -- 62 18 98 %   11/03/22 1618 (!) 154/83 98.8 °F (37.1 °C) Axillary 79 17 96 %   11/03/22 1110 122/72 99.5 °F (37.5 °C) Axillary 73 17 95 %     PHYSICAL EXAM:     GENERAL APPEARANCE:  Patient is obtunded, does not answer to questions or follow commands.    HEENT: Normocephalic and atraumatic. PERRL. Oropharynx unremarkable.  PULM: Comfortable on room air.  CV: RRR.  ABDOMEN: Soft, nontender.  EXTREMITIES: No signs of vascular compromise. Pulses present. No cyanosis, clubbing or edema.  SKIN: Clear; no rashes, lesions or skin breaks in exposed areas.      NEURO:   MENTAL STATUS: Patient is obtunded, does not answer to questions or follow commands.    CRANIAL NERVES II-XII:  Face is grossly symmetric.  Unable to assess any other  cranial nerves due to his mental status  MOTOR: Normal bulk.  Tone is normal in upper and lower extremities bilaterally.    REFLEXES: DTRs 2+; normal and symmetric throughout.   SENSATION: Sensation cannot be tested  COORDINATION:  Cannot be tested  STATION: [place] deferred.  GAIT: Deferred.    CURRENT SCHEDULED MEDICATIONS:   ampicillin-sulbactim (UNASYN) IVPB  3 g Intravenous Q6H    bromocriptine  2.5 mg Oral Daily    dantrolene (DANTRIUM) IVPB  1 mg/kg Intravenous Q8H    enoxaparin  40 mg Subcutaneous Daily    fat emulsion 20%  250 mL Intravenous Daily    levothyroxine  44 mcg Intravenous Daily    lorazepam  1 mg Intravenous QHS    scopolamine  1 patch Transdermal Q3 Days    senna-docusate 8.6-50 mg  1 tablet Oral BID    sodium chloride 0.9%  10 mL Intravenous Q6H    valproate sodium (DEPACON) IVPB  250 mg Intravenous Q8H     CURRENT INFUSIONS:   Amino acid 5% - dextrose 15% (CLINIMIX-E) solution with additives.  CENTRAL LINE ONLY (1395 mOsm/L) 70 mL/hr at 11/04/22 0623    lactated ringers 20 mL/hr at 11/04/22 0623     DATA:  Recent Labs   Lab 10/29/22  1121 10/30/22  0428 10/31/22  0440 11/01/22  0447 11/02/22  0452 11/03/22  0804   * 150* 149* 144 140 137   K 4.3 3.3* 3.2* 2.9* 3.7 4.1   * 120* 120* 112* 110 109   CO2 24 22* 22* 22* 21* 22*   BUN 42* 30* 19 14 13 13   CREATININE 1.2 0.9 0.8 0.9 0.8 0.7    112* 105 121* 124* 91   CALCIUM 10.7* 9.0 9.1 8.7 8.7 8.8   PHOS  --  2.0* 2.5* 1.7* 2.9 2.5*   MG  --  2.1 2.0 1.8 1.9 1.8   * 108* 89* 56* 40 30   ALT 42 44 48* 42 38 36     Recent Labs   Lab 10/29/22  1121 10/30/22  0428 10/31/22  0440 11/01/22 0447 11/02/22  0453 11/03/22  0804   WBC 17.42* 9.24 7.73 9.89 10.40 9.89   HGB 13.7* 11.0* 11.5* 11.3* 11.2* 11.2*   HCT 41.9 33.4* 34.4* 33.7* 33.7* 34.3*    217 221 224 228 197     Lab Results   Component Value Date    PROTEINCSF 32 11/01/2022    GLUCCSF 62 11/01/2022     Hemoglobin A1c   Date Value Ref Range Status   05/09/2018  5.6 4.2 - 6.3 % Final            I have personally reviewed and interpreted the pertinent imaging and lab results.  Imaging Results               CT Abdomen Pelvis  Without Contrast (Final result)  Result time 10/29/22 16:26:14      Final result by Luana Horner MD (10/29/22 16:26:14)                   Impression:      Study limited by motion, demonstrating a complicated cyst or solid mass extending from the lower pole of the left kidney.  This should be worked up further with a study with IV contrast when the patient is able to remain still.    Nonobstructing right renal calculus.    Constipation.    This report was flagged in Epic as abnormal.      Electronically signed by: Luana Horner  Date:    10/29/2022  Time:    16:26               Narrative:    EXAMINATION:  CT ABDOMEN PELVIS WITHOUT CONTRAST    CLINICAL HISTORY:  Abdominal abscess/infection suspected; failure to thrive    TECHNIQUE:  Low dose axial images, sagittal and coronal reformations were obtained from the lung bases to the pubic symphysis.  Neither oral nor IV contrast was administered.  The patient had a difficult time remaining still for image acquisition    COMPARISON:  None    FINDINGS:  Consolidation in the dependent portions of the lung bases is likely due to atelectasis.    Limited noncontrast images of the liver, gallbladder, spleen, adrenal glands, pancreas are grossly normal.  There is a nonobstructing 5 mm right lower pole renal calculus.  There is a 3.2 x 3.5 cm structure with a density of 46 Hounsfield units extending from the lower pole of the left kidney series 4, image 99.  This may be a complex cyst or solid mass.  No adenopathy is noted.    The aorta is normal in caliber.    There is generalized fecal stasis throughout the colon.  No evidence of a bowel obstruction.  Urinary bladder unremarkable.  There is no free intraperitoneal fluid or air.  No acute findings in the bones.                                       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  This is likely an incidental finding.  It produces no mass effect/edema on the adjacent brain.  Otherwise, essentially negative study, aside from atrophy.      Electronically signed by: Luana Horner  Date:    10/29/2022  Time:    12:01               Narrative:    EXAMINATION:  CT HEAD WITHOUT CONTRAST    CLINICAL HISTORY:  Mental status change, unknown cause;    TECHNIQUE:  Low dose axial images were obtained through the head.  Coronal and sagittal reformations were also performed. Contrast was not administered.    COMPARISON:  None    FINDINGS:  The study is mildly limited by motion.  There is no intra or extra-axial hemorrhage.  No mass effect, edema or midline shift is present.  Mild generalized atrophy, particularly central atrophy is noted.  There is no skull fracture.  The visualized paranasal sinuses are clear.    There is 1.6 cm calcified mass in the floor of the right anterior cranial fossa, extending from the petrous temporal bone.  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  There is no edema in the adjacent temporal lobe.                                       X-Ray Chest AP Portable (Final result)  Result time 10/29/22 11:35:49      Final result by Luana Horner MD (10/29/22 11:35:49)                   Impression:      No acute cardiopulmonary disease.      Electronically signed by: Luana Horner  Date:    10/29/2022  Time:    11:35               Narrative:    EXAMINATION:  XR CHEST AP PORTABLE    CLINICAL HISTORY:  Altered mental status, unspecified    TECHNIQUE:  Single frontal view of the chest was performed.    COMPARISON:  05/28/2018    FINDINGS:  The patient is mildly rotated to the right.  The heart  size is normal.  The aorta is ectatic.  The lungs are clear.  There is no pleural effusion or acute bony abnormality.                                              ASSESSMENT AND PLAN:    Encephalopathy    Etiology of encephalopathy could be multifactorial.  Patient has been febrile and there is concern for infectious encephalopathy versus worsening dementia versus polypharmacy. Also in the differential is NMS vs Serotonin syndrome(less likely with no significant hyperreflexia) as CK has been elevated(tone was not able to be assessed) and been febrile.  Repeat EEG:  Moderate encephalopathy without seizures or epileptiform activity.  Etiology of his lower extremity movements likely dyskinesias and less likely to be seizures.  Continue IV fluids and scheduled ativan  Patient is on Trileptal at home which is likely for mood.  Patient does not have any history of seizures as per his wife.  If cannot take p.o. medication, recommend c/w valproic acid 250 mg t.i.d. for mood  CSF results WBC 4, protein 32, glucose 62.  Concern for intracranial infection is low.    Continue Dantrolene(slow taper off in the next few days), ativan for possible NMS.   Continue IV fluids and trend CK  Hold Zyprexa and Fluoxetine. Appreciate psych recs  ID on board. Concern for aspiration PNA and being treated with antibiotics.  Currently on Unasyn  DVT prophylaxis  Will follow          35 minutes of care time has been spent evaluating with the patient. Time includes chart review not limited to diagnostic imaging, labs, and vitals, patient assessment, discussion with family and nursing, current order evaluations, and new order entries.      Lobito Calle MD  Neurology/vascular Neurology  Date of Service: 11/04/2022  9:36 AM    Please note: This note was transcribed using voice recognition software. Because of this technology there are often uinintended grammatical, spelling, and other transcription errors. Please disregard these errors.

## 2022-11-04 NOTE — PROGRESS NOTES
Progress Note  Infectious Disease    Reason for Consult:  AMS + Fever rule out meningitis     HPI: Ronn Caballero Jr. is a 72 y.o. male with past medical history of thyroid cancer, frontal temporal dementia, hypertension, hyperlipidemia, depression/anxiety who was transferred from [facility] due to 6 days of failure to thrive.  History obtain from wife at bedside.  She states patient started acting aggressive 2 weeks ago, she had to call the ambulance and patient was placed at [place].  Of note patient was taking Zyprexa 2.5 mg daily and dose was increased 10 mg twice a day.  He was also started on gabapentin 3 times a day.  Wife states she was not able to communicate with the facility for about a week until she called her daughter for assistance and was advised to bring the patient to the emergency room.  As per records, patient received Haldol x4, Benadryl, and Ativan x2.  Reports of not eating, not drinking noted.  No personal history of seizures.  She states patient has shuffled gait at baseline, last time she saw him at home he did not have cough, shortness of breath, nausea or vomiting, or any complaint before being transferred to psychiatric facility.    In the ER, patient hypertensive, afebrile   Labs on admission, leukocytosis 17.4, left shift 85%, bands 2%, H&H 13.7/41.9, platelet count 274   Hypernatremia 153  Creatinine 1.2   /ALT 42   CPK 3221   Lactic acid 1.2   Procalcitonin 0.3  UA with no pyuria, occasional bacteria, negative for UTI   Blood cultures x2 no growth to date, pending final   Chest x-ray clear   CT head with calcified mass in the floor of the right middle cranial fossa, most suggestive of petrous apex meningioma.  Incidental finding.  Essential negative study, aside from atrophy.    CT abdomen/pelvis limited by motion, cyst on left kidney.  Constipation.  Nonobstructive renal stone.    Hospital course complicated by fever of  101 10/30 and 102.5 today.   "ID consult to rule out meningitis. Empirically started on Vancomycin and Ceftriaxone IV.     INTERVAL HISTORY:  11/1: Interim reviewed, patient seen and examined at beside, remains somnolent, not following commands, gargling. Hypertensive, Tmax 102.3 overnight, currently afebrile. Discussed with neurology, will try empiric dantrolene, ordered MRI brain and attempt LP. Labs reviewed, WBC 9.8, left shift 75.4%, creatinie stable. Phsophorus 1.7, AST 56 trending down, ALT normal. Micro reviewed, no growth so far.     11/2:  Interim reviewed, patient seen examined at bedside.  Remains somnolent, not following commands.  Hypertensive, T-max 101.4° yesterday, currently afebrile.  Had LP performed yesterday, WBC 4, normal glucose and protein, negative for meningitis.  Discussed with respiratory therapy, copious amount of purulent secretions suctioned, specimen sent for culture.  Labs reviewed, white count 10.4, left shift 76%, H&H 11.2/33.7, platelet count 228.  Stable kidney function, albumin 2.4, normal LFTs.  CPK trending down 06/03/2005, lactic acid 1, procalcitonin 0.7, suspecting ongoing aspiration pneumonia.    11/3: Interim reviewed, patient seen and examined at bedside, wife and son present. Patient remains with his eyes closed, responding to verbal commands, "what, yes'. Hemodynamically stable, afebrile in the last 36h, tolerating Dantrolene.  Adequate urine output, Fernandes remains in place, passing gas, no bowel movements yet.  Labs reviewed, stable white count, no left shift, H&H 11.2/34.3, platelet count 197.  Chest x-ray clear.     11/4:  Interim reviewed, patient seen examined at bedside, remains obtunded, with spontaneous jerky movements, wife at bedside.  Patient has been afebrile since 11/2, labs reviewed stable.  Procalcitonin now normal.  Micro reviewed respiratory culture grew Staph aureus, sensitivities pending.    Review of patient's allergies indicates:  No Known Allergies  Past Medical History: "   Diagnosis Date    Cancer     Dementia     Depression     Hypertension      Past Surgical History:   Procedure Laterality Date    Lymph node Ca removed       Social History     Tobacco Use    Smoking status: Not on file    Smokeless tobacco: Not on file   Substance Use Topics    Alcohol use: Not on file        No family history on file.    Pertinent medications noted: Zyprexa/haldol/Ativan    Review of Systems: Patient somnolent, unable to follow commands.     Outdoor activities: From home, recently admitted to [facility] for abnormal behavior in the setting of frontotemporal dementia.  Travel: None  Implants: None  Antibiotic History: Vanco/Ceftriaxone    EXAM & DIAGNOSTICS REVIEWED:   Vitals:     Temp:  [97.8 °F (36.6 °C)-100.2 °F (37.9 °C)]   Temp: 97.8 °F (36.6 °C) (11/04/22 0740)  Pulse: 64 (11/04/22 0740)  Resp: 18 (11/04/22 0740)  BP: (!) 143/68 (11/04/22 0740)  SpO2: 97 % (11/04/22 0740)    Intake/Output Summary (Last 24 hours) at 11/4/2022 0949  Last data filed at 11/4/2022 0623  Gross per 24 hour   Intake 3300.03 ml   Output 3850 ml   Net -549.97 ml       General:  Frail, chronically-ill appearing, obtunded with spontaneous jerks, O2 by Nc 2L  Eyes:  Eyes shut, unable to open  ENT:  Moist mucus membranes  Neck:  Supple   Lungs: Coarse breath sounds b/l  Heart:  S1/S2+, regular rhythm, no murmurs  Abd:  +BS, soft, non tender, non distended, no rebound  :  Voids  Musc:  Generalized muscle wasting, joints without effusion, swelling,  erythema, synovitis  Skin:  Warm, R arm with resolved redness on AC fossa, seems like prior infiltrated IV, scab noted, see pic below, dressing in place, improved  Wound:   Neuro:  Mumbling, not following commands   Psych:    Lymphatic:       Extrem: No LE edema b/l  VAD:  PICC line L 11/1      Isolation: None    11/2:          11/1:        10/31:        General Labs reviewed:  Recent Labs   Lab 11/01/22  0447 11/02/22  0453 11/03/22  0804   WBC 9.89 10.40 9.89   HGB 11.3* 11.2*  11.2*   HCT 33.7* 33.7* 34.3*    228 197       Recent Labs   Lab 11/01/22  0447 11/02/22  0452 11/03/22  0804    140 137   K 2.9* 3.7 4.1   * 110 109   CO2 22* 21* 22*   BUN 14 13 13   CREATININE 0.9 0.8 0.7   CALCIUM 8.7 8.7 8.8   PROT 5.6* 5.6* 5.4*   BILITOT 0.7 0.4 0.7   ALKPHOS 79 83 71   ALT 42 38 36   AST 56* 40 30     No results for input(s): CRP in the last 168 hours.  No results for input(s): SEDRATE in the last 168 hours.    Estimated Creatinine Clearance: 78.8 mL/min (based on SCr of 0.7 mg/dL).     Micro:  Microbiology Results (last 7 days)       Procedure Component Value Units Date/Time    Aerobic culture [[id number]] Collected: 11/02/22 1200    Order Status: Completed Specimen: Wound from Arm, Right Updated: 11/04/22 0927     Aerobic Bacterial Culture No significant isolate to date    Narrative:      Antecubital space    CSF culture [[id number]] Collected: 11/01/22 1513    Order Status: Completed Specimen: CSF (Spinal Fluid) from CSF Tap, Tube 2 Updated: 11/04/22 0733     CSF CULTURE No Growth to date     Gram Stain Result No WBC's, epithelial cells or organisms seen      11/01/2022  17:11 TN3    Blood culture (site 2) [[id number]] Collected: 10/29/22 1543    Order Status: Completed Specimen: Blood Updated: 11/04/22 0612     Blood Culture, Routine No growth after 5 days.    Narrative:      Site # 2, aerobic only    Blood culture (site 1) [[id number]] Collected: 10/29/22 1542    Order Status: Completed Specimen: Blood Updated: 11/04/22 0612     Blood Culture, Routine No growth after 5 days.    Narrative:      Site # 1, aerobic and anaerobic    Culture, Respiratory with Gram Stain [[id number]]  (Abnormal) Collected: 11/01/22 1744    Order Status: Completed Specimen: Respiratory from Tracheal Aspirate Updated: 11/03/22 1154     Respiratory Culture STAPHYLOCOCCUS AUREUS  Many  Susceptibility pending  Normal respiratory annetta also present       Gram Stain (Respiratory) Many WBC's     Gram  Stain (Respiratory) Few Gram positive cocci     Gram Stain (Respiratory) Few Gram negative rods     Gram Stain (Respiratory) Rare Gram positive rods     Gram Stain (Respiratory) Rare budding yeast    Gram stain [[id number]] Collected: 11/01/22 1513    Order Status: Canceled Specimen: CSF (Spinal Fluid) from CSF Tap, Tube 2             Imaging Reviewed:  CXR  CT head   CT head/neck  CT abdomen/pelvis     MRI Brain:  1. Moderate to marked patient motion slightly limits evaluation but there is no acute infarction.  There is no acute intracranial hemorrhage or parenchymal mass.  There is generalized volume loss.  There is also mild nonspecific white matter change.   2. There is calcification in the posterolateral aspect of the right middle cranial fossa which is better demonstrated on comparison CT.  There is no definite associated enhancement as would be expected in a meningioma.  This could potentially represent very prominent incidental dural calcification or possible osteoma.  This is not acute.     Cardiology: EKG Qtc 496ms       IMPRESSION & PLAN     AMS + Fever, meningitis ruled out - NMS vs Serotoninergic syndrome in the setting of recent increased dose of Zyprexa and other neuroleptics, favoring the latter  Blood cultures x 2 no growth   LP negative for meningitis   Respiratory culture Staph aureus, sensitivities pending   Wound cultures 11/1 no growth    2. Aspiration pneumonia  Respiratory culture Staph aureus, sensitivities pending    Procal 0.7-->0.2    3. R arm mild cellulitis, improved  4. Rhabdomyolysis, improving   5. Transaminitis, trending down  6. PMHx: thyroid cancer, frontal temporal dementia, hypertension, hyperlipidemia, depression/anxiety     Recommendations:  Start Vancomycin IV for MRSA, keep level 15-20  If MSSA, can dc Vanco   Continue Unasyn 3g IV q6h   Neurology recommendations appreciated   Aspiration precautions  Oral hygiene  Incentive spirometry  Wound care to R arm  Pain & Palliative  to discuss goals of care    D/w Wife, nursing, Dr Mehta     Medical Decision Making during this encounter was  [_] Low Complexity  [_] Moderate Complexity  [xx] High Complexity

## 2022-11-04 NOTE — ASSESSMENT & PLAN NOTE
Working diagnosis in the setting of fever, muscle rigidity, AMS and exposure to multiple anti-psychotics   Infection workup in process   CSF culture negative, arbovirus panel pending   Dantrolene IV Q8 hours down from Q6 - dosing per neurology  Cannot take bromocriptine due to NPO/mental status

## 2022-11-04 NOTE — ASSESSMENT & PLAN NOTE
Not taking PO  TPN for nutrition   Progression of dementia  Palliative care discussion - DNR  If he does not recover neurologically, spouse will need to make decision regarding feeding tube vs hospice

## 2022-11-04 NOTE — PLAN OF CARE
Plan of care reviewed with patient. Patient verbalized complete understanding. Restlessness and confusion noted. Bilateral wrist restraints reordered at 1355. Antibiotics administered as scheduled. Condom cath in place for comfort. All fall precautions maintained. Bed in lowest position, locked, call light within reach. Side rails up x's 2. Slip resistant socks maintained.

## 2022-11-04 NOTE — PROGRESS NOTES
[facility] Medicine  Progress Note    Patient Name: Ronn Caballero Jr.  MRN: [id number]  Patient Class: IP- Inpatient   Admission Date: 10/29/2022  Length of Stay: 6 days  Attending Physician: Jefferson Mehta MD  Primary Care Provider: JELLY Decker        Subjective:     Principal Problem:Dementia with behavioral disturbance        HPI:  73 yo male with h/o thyroid cancer, frontal temporal lobe dementia, HTN, HLP, depression and anxiety transferred from [facility] due to 6 days of failure to thrive. It is reported from the facility that he was PEC/CEC (expires 11/3/22) due to combative behavior at home. History comes from the patient's spouse and sister-in -law at bedside. Patient follows a neurologist in [place] and spouse reports recent change in medications include increase of zyprexa from 2.5mg to 10mg BID. He was also started on gabapentin TID. Spouse denies recent fever, chills, cough, D/N/V.  Per the MAR from [place] he received Haldol x4 IM, benadryl and ativan IV x2. The facility reports he was not eating or drinking and no witnessed episodes of diarrhea or vomiting. On our workup he has Na153, elevated  AST, hematuria, WBC 17k with 2% bands, lactic acid 1.2. No source of infection on UA or chest xray. On exam he does show discomfort in RUQ and midepigastrium. Abd CT and lipase pending. Troponin mildly elevated 0.056 with some EKG changes.     Hospital medicine consulted for further medical management. Physical restraints will be used to protect patient as he is pulling at cardiac leads and peripheral IV. Neurology consulted. Needs tele sitter.       Overview/Hospital Course:  Patient admitted from [facility] facility with failure to thrive and progressive psychosis. He has h/o frontal temporal dementia. Overnight he was given IV ativan and has no improvement in behavior. Patient unable to participate in a telepsych visit.  Neurology was consulted on guidance to medications. Appears he was given a combination of meds in addition to his home medication and possibly polypharmacy resulted in psychosis. He does require restraints as he is a harm to himself. Will try to resume home medications if he can follow commands to swallow pills. Electrolyte replacement by IV. IVF for hypernatremia and elevated CP >3000. Monitoring on tele due to QT prolongation. The spouse and daughter at bedside and agree to palliative care discussion in regards to goals of care and advanced care planning.     Fevers persistent - started on amp, rocephin, vanc and acyclovir. LP completed and studies pending. Brain MRI: poor study due to motion artifact, generalized volume loss. Zyprexa dc'd, concern for NMS. Ativan IV Ordered. Restraints remain in place. Picc line placed to RUE. TPN ordered for nutrition. Unable to take oral meds and diet due to mental status. CK trending down 1700. Na 144.     Meningitis antibiotics dc'd, ativan weaning, TPN infusing, CXR did not show infiltrates, continues to spike fevers.  Psychiatry consult placed, though not able to do if patient cannot communicate on camera. Resp culture pending, unasyn IV should cover. Continuing dantrolene q8 hours. With drop in O2 sats on RA, place on supplemental O2. CK trending down. LA normal.     PEC/CEC dc'd/. Fevers resolved. Resp culture + staph - species pending. ID, neurology and psychiatry following. Depakote IV for mood stabilization. Prefer to not try anti-psychotics and SSRi in setting of possible NMS. Palliative care had meeting with patient/family made DNR.     Continues obtunded not following commands. TPN continued. To continue with supportive care and re-evaluate goals of care.      Interval History: Patient seen and examined. NAEON. No changes from yesterday. Wife at bedside.    Review of Systems   Unable to perform ROS: Mental status change   Objective:     Vital Signs (Most  Recent):  Temp: 97.2 °F (36.2 °C) (11/04/22 1221)  Pulse: (!) 58 (11/04/22 1221)  Resp: 18 (11/04/22 1221)  BP: 124/89 (11/04/22 1221)  SpO2: 98 % (11/04/22 1223)   Vital Signs (24h Range):  Temp:  [97.2 °F (36.2 °C)-100.2 °F (37.9 °C)] 97.2 °F (36.2 °C)  Pulse:  [58-79] 58  Resp:  [17-18] 18  SpO2:  [96 %-99 %] 98 %  BP: (124-184)/(68-89) 124/89     Weight: 56.7 kg (125 lb)  Body mass index is 19.58 kg/m².    Intake/Output Summary (Last 24 hours) at 11/4/2022 1459  Last data filed at 11/4/2022 0623  Gross per 24 hour   Intake 3300.03 ml   Output 2650 ml   Net 650.03 ml      Physical Exam  Constitutional:       General: He is not in acute distress.     Appearance: He is ill-appearing.      Comments: In restraints   HENT:      Head: Normocephalic and atraumatic.   Cardiovascular:      Rate and Rhythm: Normal rate and regular rhythm.   Pulmonary:      Effort: Pulmonary effort is normal. No respiratory distress.      Breath sounds: No wheezing.   Abdominal:      General: Abdomen is flat. Bowel sounds are normal. There is no distension.      Palpations: Abdomen is soft.      Tenderness: There is no abdominal tenderness. There is no guarding or rebound.   Musculoskeletal:         General: No swelling or tenderness.      Cervical back: Neck supple. No tenderness.   Skin:     General: Skin is warm and dry.   Neurological:      Mental Status: He is disoriented.      GCS: GCS eye subscore is 3. GCS verbal subscore is 3. GCS motor subscore is 5.      Comments: Obtunded  Not able to cooperate       Significant Labs: All pertinent labs within the past 24 hours have been reviewed.    Significant Imaging: I have reviewed all pertinent imaging results/findings within the past 24 hours.      Assessment/Plan:      * Dementia with behavioral disturbance  Sharp change in status. Possibly infectious or metabolic   CT of abdomen and pelvis - left kidney mass vs complex cyst, non obstructing stone   Head CT and brain MRi reviewed  IVF    Neurology consulted and following  Psychiatry consult placed - patient cannot participate in current state - possible medication recommendation for FTD as to cause NMS in future   [facility]  [facility]   Neurology recs: - resume home meds- if able to follow commands and swallow properly   Restraints continued  Tele monitoring   See fever section for CNS infx rule out  See NMS section as well    Frontotemporal dementia  Follows neurologist in [place] with recent increases in meds PTA - zyprexa 2.5mg to 10mg BID + Prozac and trileptal   Trileptal level <1   See above      Fever  CXR:  PICC placed in good position.  1.5 cm density projecting over the right mid lung feel likely represents overlapping shadows but follow-up chest x-rays are recommended to assure resolution and absence of a pulmonary lesion  CTA head and neck :  4. Right upper lobe ground-glass opacities in patchy consolidation, nonspecific and may reflect sequelae of small airways inflammation or infection.   UA reflex to culture - NTD  Blood culture- NTD  CSF - negative      Ampicillin, acyclovir, rocephin and vancomycin - dc'd  Resp did deep suction - pending species Staph and covered with Unasyn, vanc added back per ID to cover for MRSA, ok to dc if MSSA  Concern for NMS - see NMS section    NMS (neuroleptic malignant syndrome)  Working diagnosis in the setting of fever, muscle rigidity, AMS and exposure to multiple anti-psychotics   Infection workup in process   CSF culture negative, arbovirus panel pending   Dantrolene IV Q8 hours down from Q6 - dosing per neurology  Cannot take bromocriptine due to NPO/mental status     Moderate malnutrition  Nutrition consulted. Most recent weight and BMI monitored-      Malnutrition (Moderate to Severe)  Energy Intake (Malnutrition): less than 75% for greater than or equal to 1 month    Final Summary  Subcutaneous Fat Loss (Final Summary): mild protein-calorie malnutrition  Muscle Loss Evaluation  (Final Summary): mild protein-calorie malnutrition         Measurements:  Wt Readings from Last 1 Encounters:   11/01/22 56.7 kg (125 lb)   Body mass index is 19.58 kg/m².    Recommendations: Recommendation/Intervention: 1) Start TPN via PICC when placed: D15 AA 5 @ 70 ml/hr + standard lipids ( provides 84 g protein ( 100% EPN), 1643 kcal ( 100% EEN)) 2) If pt remains NPO and NGT placement become possible or PEG needed longterm rec TF: Peptamen 1.5 Prebio @ 20 ml/hr advancing to goal of 45 ml/hr + 130 ml flush q 4 hr  ( provides 1620 kcal ( 100% EEN), 73 g protein ( 100% EPN),and 831 ml free water) 3) If AMS improves and able to advance diet rec. regular, texure per SLP to encourage PO intakes 4) weigh weekly  Goals: 1) Nutrition support meeting > 75% of needs at f/u    Patient has been screened and assessed by RD. RD will follow patient.    TPN until patient is alert for oral diet vs peg tube placement     Goals of care, counseling/discussion  Spouse decided on DNR   Appreciate palliative care meeting with family - ongoing    Bandemia  Leukocytosis - unknown etiology  LA 1.2 and no fever  UA - no signs of infections  CXR - clear   CT of abdomen  Trend CBC - WBC normal range and no bandemia on repeat - with IVF      Postoperative hypothyroidism  Resume synthroid - IV due to NPo status   FT4 - normal     Hypernatremia  Resolved with IVF    Failure to thrive in adult  Not taking PO  TPN for nutrition   Progression of dementia  Palliative care discussion - DNR  If he does not recover neurologically, spouse will need to make decision regarding feeding tube vs hospice    essential hypertension  Chronic, controlled  Not taking po  IV labetalol PRN       VTE Risk Mitigation (From admission, onward)         Ordered     enoxaparin injection 40 mg  Daily         10/29/22 1513     IP VTE HIGH RISK PATIENT  Once         10/29/22 1513     Place sequential compression device  Until discontinued         10/29/22 1513                 Discharge Planning   TAYLOR: 11/7/2022     Code Status: DNR   Is the patient medically ready for discharge?:     Reason for patient still in hospital (select all that apply): Patient trending condition  Discharge Plan A: Psychiatric hospital        Jefferson Mehta MD  Department of Hospital Medicine   [facility]

## 2022-11-04 NOTE — CARE UPDATE
11/04/22 0745   Patient Assessment/Suction   Level of Consciousness (AVPU) responds to voice   Suction Method nasal   $ Suction Charges NT Suction Procedure   Secretions Amount scant   Secretions Color red-streaked;clear   Secretions Characteristics thin   PRE-TX-O2   O2 Device (Oxygen Therapy) nasal cannula   $ Is the patient on Low Flow Oxygen? Yes   Flow (L/min) 2  (decreased to 1L)   SpO2 99 %   Pulse Oximetry Type Intermittent   $ Pulse Oximetry - Multiple Charge Pulse Oximetry - Multiple

## 2022-11-04 NOTE — ASSESSMENT & PLAN NOTE
"I met with Mr. Ash at bedside. He lacks capacity for complex medical decision making. I met with his wife, sister-in-law, and brother-in-law outside of his room.    They all have an accurate understanding of his current medical decision making.     Ms. Caballero is quite anxious today. She had many concerns. Namely she is concerned about "having to make hard decisions" in the coming days. I explored that a bit. She explains that even prior to hospitalization his quality of life might be described as suffering. I shared my concern that even in the best case scenario he will not get back to his previous state of being. She understood and was not surprised by this news. She also explains she wants to ensure she has given him every opportunity to get better.     We spoke back and forth. I made a recommendation that we continue current measures as a time limited trial. If he is making progress towards a reasonable quality of life then we can reevaluate the path ahead. If, on the other hand, he is not making progress towards a reasonable quality of life early next week then my recommendation would be to pursue hospice. She understood and agreed. She requested that I meet with her and her children Tuesday morning so that they can all be in agreement on the path forward. I agreed.     Of note, I did tell her that his condition could change over the weekend and recommendations might be made by his primary team; particularly if his condition deteriorates. She understood.     I hope I have been helpful. I will follow up next week.    "

## 2022-11-04 NOTE — SUBJECTIVE & OBJECTIVE
Interval History: No major changes    Past Medical History:   Diagnosis Date    Cancer     Dementia     Depression     Hypertension        Past Surgical History:   Procedure Laterality Date    Lymph node Ca removed         Review of patient's allergies indicates:  No Known Allergies    Medications:  Continuous Infusions:   Amino acid 5% - dextrose 15% (CLINIMIX-E) solution with additives.  CENTRAL LINE ONLY (1395 mOsm/L) 70 mL/hr at 11/04/22 0623    Amino acid 5% - dextrose 15% (CLINIMIX-E) solution with additives.  CENTRAL LINE ONLY (1395 mOsm/L)      [START ON 11/5/2022] Amino acid 5% - dextrose 15% (CLINIMIX-E) solution with additives.  CENTRAL LINE ONLY (1395 mOsm/L)      lactated ringers 20 mL/hr at 11/04/22 0623     Scheduled Meds:   ampicillin-sulbactim (UNASYN) IVPB  3 g Intravenous Q6H    bromocriptine  2.5 mg Oral Daily    dantrolene (DANTRIUM) IVPB  1 mg/kg Intravenous Q8H    enoxaparin  40 mg Subcutaneous Daily    fat emulsion 20%  250 mL Intravenous Daily    [START ON 11/5/2022] fat emulsion 20%  250 mL Intravenous Daily    levothyroxine  44 mcg Intravenous Daily    lorazepam  1 mg Intravenous QHS    scopolamine  1 patch Transdermal Q3 Days    senna-docusate 8.6-50 mg  1 tablet Oral BID    sodium chloride 0.9%  10 mL Intravenous Q6H    valproate sodium (DEPACON) IVPB  250 mg Intravenous Q8H    vancomycin (VANCOCIN) IVPB  1,250 mg Intravenous Q12H     PRN Meds:acetaminophen, aluminum-magnesium hydroxide-simethicone, bisacodyL, dextrose 10%, dextrose 10%, glucagon (human recombinant), glucose, glucose, labetaloL, naloxone, ondansetron, sodium chloride 0.9%, Flushing PICC Protocol **AND** sodium chloride 0.9% **AND** sodium chloride 0.9%, Pharmacy to dose Vancomycin consult **AND** vancomycin - pharmacy to dose    Family History    None       Tobacco Use    Smoking status: Not on file    Smokeless tobacco: Not on file   Substance and Sexual Activity    Alcohol use: Not on file    Drug use: Not on file     Sexual activity: Not on file       Review of Systems   Unable to perform ROS: Mental status change   Objective:     Vital Signs (Most Recent):  Temp: 97.2 °F (36.2 °C) (11/04/22 1221)  Pulse: (!) 58 (11/04/22 1221)  Resp: 18 (11/04/22 1221)  BP: 124/89 (11/04/22 1221)  SpO2: 98 % (11/04/22 1223)   Vital Signs (24h Range):  Temp:  [97.2 °F (36.2 °C)-100.2 °F (37.9 °C)] 97.2 °F (36.2 °C)  Pulse:  [58-79] 58  Resp:  [17-18] 18  SpO2:  [96 %-99 %] 98 %  BP: (124-184)/(68-89) 124/89     Weight: 56.7 kg (125 lb)  Body mass index is 19.58 kg/m².    Physical Exam  Constitutional:       General: He is not in acute distress.     Appearance: He is ill-appearing. He is not toxic-appearing.   HENT:      Head: Normocephalic and atraumatic.      Right Ear: External ear normal.      Left Ear: External ear normal.      Mouth/Throat:      Mouth: Mucous membranes are dry.      Pharynx: Oropharynx is clear. No oropharyngeal exudate or posterior oropharyngeal erythema.   Eyes:      General:         Right eye: No discharge.         Left eye: No discharge.      Extraocular Movements: Extraocular movements intact.   Cardiovascular:      Rate and Rhythm: Normal rate and regular rhythm.   Pulmonary:      Effort: Pulmonary effort is normal. No respiratory distress.      Breath sounds: No wheezing.   Abdominal:      General: There is no distension.      Palpations: Abdomen is soft.   Musculoskeletal:         General: No swelling or tenderness.      Cervical back: Neck supple. No tenderness.   Skin:     General: Skin is warm and dry.   Neurological:      Comments: obtunded       Review of Symptoms      Symptom Assessment (ESAS 0-10 Scale)  Unable to complete assessment due to Mental status change         Pain Assessment in Advanced Demential Scale (PAINAD)   Breathing - Independent of vocalization:  0  Negative vocalization:  0  Facial expression:  1  Body language:  1  Consolability:  1  Total:  3    Performance Status:  20    Living  Arrangements:  Lives with spouse     Time-Based Charting:  Yes  Chart Review: 10 minutes  Face to Face: 40 minutes  Coordination of Care: 10 minutes    Total Time Spent: 60 minutes      Advance Care Planning   Advance Directives:   Do Not Resuscitate Status: Yes      Decision Making:  Family answered questions  Goals of Care: What is most important right now is to focus on improvement in condition but with limits to invasive therapies, comfort and QOL . Accordingly, we have decided that the best plan to meet the patient's goals includes continuing with treatment.       Significant Labs: BMP:   Recent Labs   Lab 11/03/22  0804   GLU 91      K 4.1      CO2 22*   BUN 13   CREATININE 0.7   CALCIUM 8.8   MG 1.8       CBC:   Recent Labs   Lab 11/03/22 0804   WBC 9.89   HGB 11.2*   HCT 34.3*          CBC:   Recent Labs   Lab 11/03/22  0804   WBC 9.89   HGB 11.2*   HCT 34.3*   MCV 93          BMP:  No results for input(s): GLU, NA, K, CL, CO2, BUN, CREATININE, CALCIUM, MG in the last 24 hours.    LFT:  Lab Results   Component Value Date    AST 30 11/03/2022    ALKPHOS 71 11/03/2022    BILITOT 0.7 11/03/2022     Albumin:   Albumin   Date Value Ref Range Status   11/03/2022 2.3 (L) 3.5 - 5.2 g/dL Final     Protein:   Total Protein   Date Value Ref Range Status   11/03/2022 5.4 (L) 6.0 - 8.4 g/dL Final     Lactic acid:   Lab Results   Component Value Date    LACTATE 1.0 11/02/2022    LACTATE 1.2 10/29/2022       Significant Imaging: I have reviewed all pertinent imaging results/findings within the past 24 hours.

## 2022-11-04 NOTE — RESPIRATORY THERAPY
11/03/22 2036   Patient Assessment/Suction   Level of Consciousness (AVPU) alert   Respiratory Effort Normal;Unlabored   Expansion/Accessory Muscles/Retractions expansion symmetric   All Lung Fields Breath Sounds coarse   Cough Frequency with stimulation   Cough Type loose;congested   Suction Method nasal   $ Suction Charges NT Suction Procedure   Secretions Amount moderate   Secretions Color yellow   Secretions Characteristics thick   PRE-TX-O2   O2 Device (Oxygen Therapy) nasal cannula   Flow (L/min) 2   SpO2 98 %   Pulse Oximetry Type Intermittent

## 2022-11-04 NOTE — SUBJECTIVE & OBJECTIVE
Interval History: Patient seen and examined. NAEON. No changes from yesterday. Wife at bedside.    Review of Systems   Unable to perform ROS: Mental status change   Objective:     Vital Signs (Most Recent):  Temp: 97.2 °F (36.2 °C) (11/04/22 1221)  Pulse: (!) 58 (11/04/22 1221)  Resp: 18 (11/04/22 1221)  BP: 124/89 (11/04/22 1221)  SpO2: 98 % (11/04/22 1223)   Vital Signs (24h Range):  Temp:  [97.2 °F (36.2 °C)-100.2 °F (37.9 °C)] 97.2 °F (36.2 °C)  Pulse:  [58-79] 58  Resp:  [17-18] 18  SpO2:  [96 %-99 %] 98 %  BP: (124-184)/(68-89) 124/89     Weight: 56.7 kg (125 lb)  Body mass index is 19.58 kg/m².    Intake/Output Summary (Last 24 hours) at 11/4/2022 1459  Last data filed at 11/4/2022 0623  Gross per 24 hour   Intake 3300.03 ml   Output 2650 ml   Net 650.03 ml      Physical Exam  Constitutional:       General: He is not in acute distress.     Appearance: He is ill-appearing.      Comments: In restraints   HENT:      Head: Normocephalic and atraumatic.   Cardiovascular:      Rate and Rhythm: Normal rate and regular rhythm.   Pulmonary:      Effort: Pulmonary effort is normal. No respiratory distress.      Breath sounds: No wheezing.   Abdominal:      General: Abdomen is flat. Bowel sounds are normal. There is no distension.      Palpations: Abdomen is soft.      Tenderness: There is no abdominal tenderness. There is no guarding or rebound.   Musculoskeletal:         General: No swelling or tenderness.      Cervical back: Neck supple. No tenderness.   Skin:     General: Skin is warm and dry.   Neurological:      Mental Status: He is disoriented.      GCS: GCS eye subscore is 3. GCS verbal subscore is 3. GCS motor subscore is 5.      Comments: Obtunded  Not able to cooperate       Significant Labs: All pertinent labs within the past 24 hours have been reviewed.    Significant Imaging: I have reviewed all pertinent imaging results/findings within the past 24 hours.

## 2022-11-04 NOTE — ASSESSMENT & PLAN NOTE
CXR:  PICC placed in good position.  1.5 cm density projecting over the right mid lung feel likely represents overlapping shadows but follow-up chest x-rays are recommended to assure resolution and absence of a pulmonary lesion  CTA head and neck :  4. Right upper lobe ground-glass opacities in patchy consolidation, nonspecific and may reflect sequelae of small airways inflammation or infection.   UA reflex to culture - NTD  Blood culture- NTD  CSF - negative      Ampicillin, acyclovir, rocephin and vancomycin - dc'd  Resp did deep suction - pending species Staph and covered with Unasyn, vanc added back per ID to cover for MRSA, ok to dc if MSSA  Concern for NMS - see NMS section

## 2022-11-04 NOTE — ASSESSMENT & PLAN NOTE
Sharp change in status. Possibly infectious or metabolic   CT of abdomen and pelvis - left kidney mass vs complex cyst, non obstructing stone   Head CT and brain MRi reviewed  IVF   Neurology consulted and following  Psychiatry consult placed - patient cannot participate in current state - possible medication recommendation for FTD as to cause NMS in future   [facility]  [facility]   Neurology recs: - resume home meds- if able to follow commands and swallow properly   Restraints continued  Tele monitoring   See fever section for CNS infx rule out  See NMS section as well

## 2022-11-04 NOTE — PROGRESS NOTES
Pharmacokinetic Initial Assessment: IV Vancomycin    Assessment/Plan:    Initiate intravenous vancomycin 1250mg IV every 12 hours  Desired empiric serum trough concentration is 15 to 20 mcg/mL  Draw vancomycin trough level 60  min prior to third dose on 11/5 at approximately 1030  Pharmacy will continue to follow and monitor vancomycin.      Please contact pharmacy at extension [phone] with any questions regarding this assessment.     Thank you for the consult,   Thad Cage       Patient brief summary:  Ronn Caballero Jr. is a 72 y.o. male initiated on antimicrobial therapy with IV Vancomycin for treatment of suspected  pneumonia    Drug Allergies:   Review of patient's allergies indicates:  No Known Allergies    Actual Body Weight:   56.7 kg    Renal Function:   Estimated Creatinine Clearance: 78.8 mL/min (based on SCr of 0.7 mg/dL).,     Dialysis Method (if applicable):  N/A    CBC (last 72 hours):  Recent Labs   Lab Result Units 11/02/22  0453 11/03/22  0804   WBC K/uL 10.40 9.89   Hemoglobin g/dL 11.2* 11.2*   Hematocrit % 33.7* 34.3*   Platelets K/uL 228 197   Gran % % 76.0* 70.0   Lymph % % 6.1* 10.8*   Mono % % 14.5 14.5   Eosinophil % % 2.3 3.0   Basophil % % 0.4 0.3   Differential Method  Automated Automated       Metabolic Panel (last 72 hours):  Recent Labs   Lab Result Units 11/01/22  1511 11/02/22  0452 11/03/22  0804   Sodium mmol/L  --  140 137   Potassium mmol/L  --  3.7 4.1   Chloride mmol/L  --  110 109   CO2 mmol/L  --  21* 22*   Glucose mg/dL  --  124* 91   Glucose, CSF mg/dL 62  --   --    BUN mg/dL  --  13 13   Creatinine mg/dL  --  0.8 0.7   Albumin g/dL  --  2.4* 2.3*   Total Bilirubin mg/dL  --  0.4 0.7   Alkaline Phosphatase U/L  --  83 71   AST U/L  --  40 30   ALT U/L  --  38 36   Magnesium mg/dL  --  1.9 1.8   Phosphorus mg/dL  --  2.9 2.5*       Drug levels (last 3 results):  Recent Labs   Lab Result Units 11/01/22  1639   Vancomycin-Trough ug/mL 8.2*       Microbiologic  Results:  Microbiology Results (last 7 days)       Procedure Component Value Units Date/Time    Aerobic culture [[id number]] Collected: 11/02/22 1200    Order Status: Completed Specimen: Wound from Arm, Right Updated: 11/04/22 0927     Aerobic Bacterial Culture No significant isolate to date    Narrative:      Antecubital space    CSF culture [[id number]] Collected: 11/01/22 1513    Order Status: Completed Specimen: CSF (Spinal Fluid) from CSF Tap, Tube 2 Updated: 11/04/22 0733     CSF CULTURE No Growth to date     Gram Stain Result No WBC's, epithelial cells or organisms seen      11/01/2022  17:11 TN3    Blood culture (site 2) [[id number]] Collected: 10/29/22 1543    Order Status: Completed Specimen: Blood Updated: 11/04/22 0612     Blood Culture, Routine No growth after 5 days.    Narrative:      Site # 2, aerobic only    Blood culture (site 1) [[id number]] Collected: 10/29/22 1542    Order Status: Completed Specimen: Blood Updated: 11/04/22 0612     Blood Culture, Routine No growth after 5 days.    Narrative:      Site # 1, aerobic and anaerobic    Culture, Respiratory with Gram Stain [[id number]]  (Abnormal) Collected: 11/01/22 1744    Order Status: Completed Specimen: Respiratory from Tracheal Aspirate Updated: 11/03/22 1154     Respiratory Culture STAPHYLOCOCCUS AUREUS  Many  Susceptibility pending  Normal respiratory annetta also present       Gram Stain (Respiratory) Many WBC's     Gram Stain (Respiratory) Few Gram positive cocci     Gram Stain (Respiratory) Few Gram negative rods     Gram Stain (Respiratory) Rare Gram positive rods     Gram Stain (Respiratory) Rare budding yeast    Gram stain [[id number]] Collected: 11/01/22 1513    Order Status: Canceled Specimen: CSF (Spinal Fluid) from CSF Tap, Tube 2

## 2022-11-04 NOTE — PLAN OF CARE
Plan of care reviewed with spouse. Patients PICC clean, dry and intact. TPN, lipdis, and IV antibiotics given. Patient on room air. Patient requiring suctioning every couple of hours. Wrist restraints in place. Bed alarm on. Bed in low, locked position with call button within reach.

## 2022-11-04 NOTE — ASSESSMENT & PLAN NOTE
Follows neurologist in [place] with recent increases in meds PTA - zyprexa 2.5mg to 10mg BID + Prozac and trileptal   Trileptal level <1   See above

## 2022-11-05 ENCOUNTER — OUTSIDE PLACE OF SERVICE (OUTPATIENT)
Dept: INFECTIOUS DISEASES | Facility: CLINIC | Age: 72
End: 2022-11-05
Payer: MEDICARE

## 2022-11-05 DIAGNOSIS — R50.9 FEVER: Primary | ICD-10-CM

## 2022-11-05 LAB
AMMONIA PLAS-SCNC: 23 UMOL/L (ref 10–50)
ANION GAP SERPL CALC-SCNC: 9 MMOL/L (ref 8–16)
BACTERIA SPEC AEROBE CULT: ABNORMAL
BACTERIA SPEC AEROBE CULT: NORMAL
BASOPHILS # BLD AUTO: 0.05 K/UL (ref 0–0.2)
BASOPHILS NFR BLD: 0.7 % (ref 0–1.9)
BUN SERPL-MCNC: 16 MG/DL (ref 8–23)
CALCIUM SERPL-MCNC: 8.5 MG/DL (ref 8.7–10.5)
CHLORIDE SERPL-SCNC: 108 MMOL/L (ref 95–110)
CO2 SERPL-SCNC: 22 MMOL/L (ref 23–29)
CREAT SERPL-MCNC: 0.6 MG/DL (ref 0.5–1.4)
DIFFERENTIAL METHOD: ABNORMAL
EOSINOPHIL # BLD AUTO: 0.4 K/UL (ref 0–0.5)
EOSINOPHIL NFR BLD: 5.7 % (ref 0–8)
ERYTHROCYTE [DISTWIDTH] IN BLOOD BY AUTOMATED COUNT: 12.3 % (ref 11.5–14.5)
EST. GFR  (NO RACE VARIABLE): >60 ML/MIN/1.73 M^2
GLUCOSE SERPL-MCNC: 95 MG/DL (ref 70–110)
GRAM STN SPEC: ABNORMAL
HCT VFR BLD AUTO: 31.4 % (ref 40–54)
HGB BLD-MCNC: 11.2 G/DL (ref 14–18)
IMM GRANULOCYTES # BLD AUTO: 0.35 K/UL (ref 0–0.04)
IMM GRANULOCYTES NFR BLD AUTO: 4.8 % (ref 0–0.5)
LYMPHOCYTES # BLD AUTO: 0.8 K/UL (ref 1–4.8)
LYMPHOCYTES NFR BLD: 10.7 % (ref 18–48)
MCH RBC QN AUTO: 33.1 PG (ref 27–31)
MCHC RBC AUTO-ENTMCNC: 35.7 G/DL (ref 32–36)
MCV RBC AUTO: 93 FL (ref 82–98)
MONOCYTES # BLD AUTO: 1.2 K/UL (ref 0.3–1)
MONOCYTES NFR BLD: 16.3 % (ref 4–15)
NEUTROPHILS # BLD AUTO: 4.5 K/UL (ref 1.8–7.7)
NEUTROPHILS NFR BLD: 61.8 % (ref 38–73)
NRBC BLD-RTO: 0 /100 WBC
PLATELET # BLD AUTO: 280 K/UL (ref 150–450)
PLATELET BLD QL SMEAR: ABNORMAL
PMV BLD AUTO: 11.3 FL (ref 9.2–12.9)
POTASSIUM SERPL-SCNC: 3.7 MMOL/L (ref 3.5–5.1)
RBC # BLD AUTO: 3.38 M/UL (ref 4.6–6.2)
SODIUM SERPL-SCNC: 139 MMOL/L (ref 136–145)
VALPROATE SERPL-MCNC: 41.4 UG/ML (ref 50–100)
VANCOMYCIN TROUGH SERPL-MCNC: 12.6 UG/ML (ref 10–22)
WBC # BLD AUTO: 7.23 K/UL (ref 3.9–12.7)

## 2022-11-05 PROCEDURE — 25000003 PHARM REV CODE 250: Performed by: STUDENT IN AN ORGANIZED HEALTH CARE EDUCATION/TRAINING PROGRAM

## 2022-11-05 PROCEDURE — 94761 N-INVAS EAR/PLS OXIMETRY MLT: CPT

## 2022-11-05 PROCEDURE — 25000003 PHARM REV CODE 250: Performed by: INTERNAL MEDICINE

## 2022-11-05 PROCEDURE — 63600175 PHARM REV CODE 636 W HCPCS: Performed by: HOSPITALIST

## 2022-11-05 PROCEDURE — 85025 COMPLETE CBC W/AUTO DIFF WBC: CPT | Performed by: STUDENT IN AN ORGANIZED HEALTH CARE EDUCATION/TRAINING PROGRAM

## 2022-11-05 PROCEDURE — 82140 ASSAY OF AMMONIA: CPT | Performed by: INTERNAL MEDICINE

## 2022-11-05 PROCEDURE — A4217 STERILE WATER/SALINE, 500 ML: HCPCS | Performed by: INTERNAL MEDICINE

## 2022-11-05 PROCEDURE — B4185 PARENTERAL SOL 10 GM LIPIDS: HCPCS | Performed by: STUDENT IN AN ORGANIZED HEALTH CARE EDUCATION/TRAINING PROGRAM

## 2022-11-05 PROCEDURE — 80202 ASSAY OF VANCOMYCIN: CPT | Performed by: STUDENT IN AN ORGANIZED HEALTH CARE EDUCATION/TRAINING PROGRAM

## 2022-11-05 PROCEDURE — 31720 CLEARANCE OF AIRWAYS: CPT

## 2022-11-05 PROCEDURE — 99231 PR SUBSEQUENT HOSPITAL CARE,LEVL I: ICD-10-PCS | Mod: S$GLB,,, | Performed by: INTERNAL MEDICINE

## 2022-11-05 PROCEDURE — 63600175 PHARM REV CODE 636 W HCPCS: Performed by: STUDENT IN AN ORGANIZED HEALTH CARE EDUCATION/TRAINING PROGRAM

## 2022-11-05 PROCEDURE — 25000003 PHARM REV CODE 250: Performed by: HOSPITALIST

## 2022-11-05 PROCEDURE — 99231 SBSQ HOSP IP/OBS SF/LOW 25: CPT | Mod: S$GLB,,, | Performed by: INTERNAL MEDICINE

## 2022-11-05 PROCEDURE — 80048 BASIC METABOLIC PNL TOTAL CA: CPT | Performed by: STUDENT IN AN ORGANIZED HEALTH CARE EDUCATION/TRAINING PROGRAM

## 2022-11-05 PROCEDURE — 80164 ASSAY DIPROPYLACETIC ACD TOT: CPT | Performed by: INTERNAL MEDICINE

## 2022-11-05 PROCEDURE — 36415 COLL VENOUS BLD VENIPUNCTURE: CPT | Performed by: STUDENT IN AN ORGANIZED HEALTH CARE EDUCATION/TRAINING PROGRAM

## 2022-11-05 PROCEDURE — 27000221 HC OXYGEN, UP TO 24 HOURS

## 2022-11-05 PROCEDURE — 12000002 HC ACUTE/MED SURGE SEMI-PRIVATE ROOM

## 2022-11-05 RX ORDER — MUPIROCIN 20 MG/G
OINTMENT TOPICAL 2 TIMES DAILY
Status: COMPLETED | OUTPATIENT
Start: 2022-11-05 | End: 2022-11-09

## 2022-11-05 RX ORDER — LORAZEPAM 2 MG/ML
0.5 INJECTION INTRAMUSCULAR NIGHTLY
Status: DISCONTINUED | OUTPATIENT
Start: 2022-11-05 | End: 2022-11-06

## 2022-11-05 RX ADMIN — LORAZEPAM 0.5 MG: 2 INJECTION INTRAMUSCULAR; INTRAVENOUS at 10:11

## 2022-11-05 RX ADMIN — MUPIROCIN: 20 OINTMENT TOPICAL at 09:11

## 2022-11-05 RX ADMIN — I.V. FAT EMULSION 250 ML: 20 EMULSION INTRAVENOUS at 10:11

## 2022-11-05 RX ADMIN — AMPICILLIN SODIUM AND SULBACTAM SODIUM 3 G: 2; 1 INJECTION, POWDER, FOR SOLUTION INTRAMUSCULAR; INTRAVENOUS at 09:11

## 2022-11-05 RX ADMIN — DANTROLENE SODIUM 55 MG: 20 INJECTION, POWDER, LYOPHILIZED, FOR SOLUTION INTRAVENOUS at 08:11

## 2022-11-05 RX ADMIN — ENOXAPARIN SODIUM 40 MG: 100 INJECTION SUBCUTANEOUS at 05:11

## 2022-11-05 RX ADMIN — DANTROLENE SODIUM 55 MG: 20 INJECTION INTRAVENOUS at 11:11

## 2022-11-05 RX ADMIN — LEVOTHYROXINE SODIUM 44 MCG: 20 INJECTION, SOLUTION INTRAVENOUS at 09:11

## 2022-11-05 RX ADMIN — DEXTROSE 250 MG: 50 INJECTION, SOLUTION INTRAVENOUS at 09:11

## 2022-11-05 RX ADMIN — ASCORBIC ACID, VITAMIN A PALMITATE, CHOLECALCIFEROL, THIAMINE HYDROCHLORIDE, RIBOFLAVIN-5 PHOSPHATE SODIUM, PYRIDOXINE HYDROCHLORIDE, NIACINAMIDE, DEXPANTHENOL, ALPHA-TOCOPHEROL ACETATE, VITAMIN K1, FOLIC ACID, BIOTIN, CYANOCOBALAMIN: 200; 3300; 200; 6; 3.6; 6; 40; 15; 10; 150; 600; 60; 5 INJECTION, SOLUTION INTRAVENOUS at 07:11

## 2022-11-05 RX ADMIN — DEXTROSE 250 MG: 50 INJECTION, SOLUTION INTRAVENOUS at 01:11

## 2022-11-05 RX ADMIN — AMPICILLIN SODIUM AND SULBACTAM SODIUM 3 G: 2; 1 INJECTION, POWDER, FOR SOLUTION INTRAMUSCULAR; INTRAVENOUS at 02:11

## 2022-11-05 RX ADMIN — VANCOMYCIN HYDROCHLORIDE 750 MG: 750 INJECTION, POWDER, LYOPHILIZED, FOR SOLUTION INTRAVENOUS at 01:11

## 2022-11-05 RX ADMIN — DANTROLENE SODIUM 55 MG: 20 INJECTION, POWDER, LYOPHILIZED, FOR SOLUTION INTRAVENOUS at 12:11

## 2022-11-05 RX ADMIN — AMPICILLIN SODIUM AND SULBACTAM SODIUM 3 G: 2; 1 INJECTION, POWDER, FOR SOLUTION INTRAMUSCULAR; INTRAVENOUS at 03:11

## 2022-11-05 RX ADMIN — DEXTROSE 250 MG: 50 INJECTION, SOLUTION INTRAVENOUS at 05:11

## 2022-11-05 NOTE — PLAN OF CARE
11/04/22 1939   Patient Assessment/Suction   Level of Consciousness (AVPU) responds to voice   Respiratory Effort Normal;Unlabored   Expansion/Accessory Muscles/Retractions expansion symmetric   All Lung Fields Breath Sounds coarse   Cough Frequency with stimulation   Cough Type productive   Suction Method nasal   $ Suction Charges NT Suction Procedure   Secretions Amount small   Secretions Color pale;yellow   Secretions Characteristics thick   PRE-TX-O2   O2 Device (Oxygen Therapy) nasal cannula   Flow (L/min) 1   SpO2 95 %   Pulse Oximetry Type Intermittent   Pulse 83   Temp 99.3 °F (37.4 °C)   BP (!) 140/71

## 2022-11-05 NOTE — NURSING
Entered room to find patient had managed to pull out his nasopharyngeal airway at this time.  Patient is 96% on 1 liter nasal cannula.  Abeba Hi NP made aware.

## 2022-11-05 NOTE — ASSESSMENT & PLAN NOTE
Sharp change in status. At this point mostly suspecting NMS  Little improvement noted  CT of abdomen and pelvis - left kidney mass vs complex cyst, non obstructing stone   Head CT and brain MRi reviewed  IVF decreased 2/2 large volume intake overall  Neurology consulted and following  Psychiatry consult placed - patient cannot participate in current state - possible medication recommendation for FTD as to cause NMS in future   No haldol given here  Nightly ativan decreased (was taking home xanax)  Neurology recs: - resume home meds- if able to follow commands and swallow properly   Restraints continued due to danger to self  Tele monitoring   See fever section for CNS infx rule out  See NMS section as well

## 2022-11-05 NOTE — PROGRESS NOTES
Progress Note  Infectious Disease    Reason for Consult:  AMS + Fever rule out meningitis     HPI: Ronn Caballero Jr. is a 72 y.o. male with past medical history of thyroid cancer, frontal temporal dementia, hypertension, hyperlipidemia, depression/anxiety who was transferred from [facility] due to 6 days of failure to thrive.  History obtain from wife at bedside.  She states patient started acting aggressive 2 weeks ago, she had to call the ambulance and patient was placed at [facility].  Of note patient was taking Zyprexa 2.5 mg daily and dose was increased 10 mg twice a day.  He was also started on gabapentin 3 times a day.  Wife states she was not able to communicate with the facility for about a week until she called her daughter for assistance and was advised to bring the patient to the emergency room.  As per records, patient received Haldol x4, Benadryl, and Ativan x2.  Reports of not eating, not drinking noted.  No personal history of seizures.  She states patient has shuffled gait at baseline, last time she saw him at home he did not have cough, shortness of breath, nausea or vomiting, or any complaint before being transferred to psychiatric facility.    In the ER, patient hypertensive, afebrile   Labs on admission, leukocytosis 17.4, left shift 85%, bands 2%, H&H 13.7/41.9, platelet count 274   Hypernatremia 153  Creatinine 1.2   /ALT 42   CPK 3221   Lactic acid 1.2   Procalcitonin 0.3  UA with no pyuria, occasional bacteria, negative for UTI   Blood cultures x2 no growth to date, pending final   Chest x-ray clear   CT head with calcified mass in the floor of the right middle cranial fossa, most suggestive of petrous apex meningioma.  Incidental finding.  Essential negative study, aside from atrophy.    CT abdomen/pelvis limited by motion, cyst on left kidney.  Constipation.  Nonobstructive renal stone.    Hospital course complicated by fever of  101 10/30 and 102.5 today.   "ID consult to rule out meningitis. Empirically started on Vancomycin and Ceftriaxone IV.     INTERVAL HISTORY:  11/1: Interim reviewed, patient seen and examined at beside, remains somnolent, not following commands, gargling. Hypertensive, Tmax 102.3 overnight, currently afebrile. Discussed with neurology, will try empiric dantrolene, ordered MRI brain and attempt LP. Labs reviewed, WBC 9.8, left shift 75.4%, creatinie stable. Phsophorus 1.7, AST 56 trending down, ALT normal. Micro reviewed, no growth so far.     11/2:  Interim reviewed, patient seen examined at bedside.  Remains somnolent, not following commands.  Hypertensive, T-max 101.4° yesterday, currently afebrile.  Had LP performed yesterday, WBC 4, normal glucose and protein, negative for meningitis.  Discussed with respiratory therapy, copious amount of purulent secretions suctioned, specimen sent for culture.  Labs reviewed, white count 10.4, left shift 76%, H&H 11.2/33.7, platelet count 228.  Stable kidney function, albumin 2.4, normal LFTs.  CPK trending down 06/03/2005, lactic acid 1, procalcitonin 0.7, suspecting ongoing aspiration pneumonia.    11/3: Interim reviewed, patient seen and examined at bedside, wife and son present. Patient remains with his eyes closed, responding to verbal commands, "what, yes'. Hemodynamically stable, afebrile in the last 36h, tolerating Dantrolene.  Adequate urine output, Fernandes remains in place, passing gas, no bowel movements yet.  Labs reviewed, stable white count, no left shift, H&H 11.2/34.3, platelet count 197.  Chest x-ray clear.     11/4:  Interim reviewed, patient seen examined at bedside, remains obtunded, with spontaneous jerky movements, wife at bedside.  Patient has been afebrile since 11/2, labs reviewed stable.  Procalcitonin now normal.  Micro reviewed respiratory culture grew Staph aureus, sensitivities pending.  11/05/22 confused,sleeps most of the time. afebrile      Antibiotics (From admission, " onward)      Start     Stop Route Frequency Ordered    11/04/22 1130  vancomycin 1.25 g in dextrose 5% 250 mL IVPB (ready to mix)         -- IV Every 12 hours (non-standard times) 11/04/22 1011    11/04/22 1049  vancomycin - pharmacy to dose  (vancomycin IVPB)        See [facility] for full Linked Orders Report.    -- IV pharmacy to manage frequency 11/04/22 0950    11/02/22 1230  ampicillin-sulbactam 3 g in sodium chloride 0.9 % 100 mL IVPB (ready to mix system)         -- IV Every 6 hours (non-standard times) 11/02/22 0951          Antifungals (From admission, onward)      None          Antivirals (From admission, onward)      None            Review of patient's allergies indicates:  No Known Allergies  Past Medical History:   Diagnosis Date    Cancer     Dementia     Depression     Hypertension      Past Surgical History:   Procedure Laterality Date    Lymph node Ca removed       Social History     Tobacco Use    Smoking status: Not on file    Smokeless tobacco: Not on file   Substance Use Topics    Alcohol use: Not on file        No family history on file.    Pertinent medications noted: Zyprexa/haldol/Ativan    Review of Systems: Patient somnolent, unable to follow commands.     Outdoor activities: From home, recently admitted to [facility] for abnormal behavior in the setting of frontotemporal dementia.  Travel: None  Implants: None  Antibiotic History: Vanco/Ceftriaxone    EXAM & DIAGNOSTICS REVIEWED:   Vitals:     Temp:  [97.2 °F (36.2 °C)-100.2 °F (37.9 °C)]   Temp: 97.2 °F (36.2 °C) (11/04/22 1221)  Pulse: (!) 58 (11/04/22 1221)  Resp: 18 (11/04/22 1221)  BP: 124/89 (11/04/22 1221)  SpO2: 98 % (11/04/22 1223)    Intake/Output Summary (Last 24 hours) at 11/4/2022 1921  Last data filed at 11/4/2022 1804  Gross per 24 hour   Intake 4991.82 ml   Output 2275 ml   Net 2716.82 ml       General:  Frail, chronically-ill appearing, obtunded with spontaneous jerks, O2 by Nc 2L  Eyes:  Eyes shut, unable to  open  ENT:  Moist mucus membranes  Neck:  Supple   Lungs: Coarse breath sounds b/l  Heart:  S1/S2+, regular rhythm, no murmurs  Abd:  +BS, soft, non tender, non distended, no rebound  :  Voids  Musc:  Generalized muscle wasting, joints without effusion, swelling,  erythema, synovitis  Skin:  Warm, R arm with resolved redness on AC fossa, seems like prior infiltrated IV, scab noted, see pic below, dressing in place, improved  Wound:   Neuro:  Mumbling, not following commands, sleeps most of the time  Psych:    Lymphatic:       Extrem: No LE edema b/l  VAD:  PICC line L 11/1      Isolation: None    11/2:          11/1:        10/31:        General Labs reviewed:  Recent Labs   Lab 11/01/22 0447 11/02/22 0453 11/03/22  0804   WBC 9.89 10.40 9.89   HGB 11.3* 11.2* 11.2*   HCT 33.7* 33.7* 34.3*    228 197       Recent Labs   Lab 11/01/22 0447 11/02/22  0452 11/03/22  0804    140 137   K 2.9* 3.7 4.1   * 110 109   CO2 22* 21* 22*   BUN 14 13 13   CREATININE 0.9 0.8 0.7   CALCIUM 8.7 8.7 8.8   PROT 5.6* 5.6* 5.4*   BILITOT 0.7 0.4 0.7   ALKPHOS 79 83 71   ALT 42 38 36   AST 56* 40 30     No results for input(s): CRP in the last 168 hours.  No results for input(s): SEDRATE in the last 168 hours.    Estimated Creatinine Clearance: 78.8 mL/min (based on SCr of 0.7 mg/dL).     Micro:  Microbiology Results (last 7 days)       Procedure Component Value Units Date/Time    Culture, Respiratory with Gram Stain [[id number]]  (Abnormal) Collected: 11/01/22 5282    Order Status: Completed Specimen: Respiratory from Tracheal Aspirate Updated: 11/04/22 1140     Respiratory Culture STAPHYLOCOCCUS AUREUS  Many  Susceptibility pending  Normal respiratory annetta also present       Gram Stain (Respiratory) Many WBC's     Gram Stain (Respiratory) Few Gram positive cocci     Gram Stain (Respiratory) Few Gram negative rods     Gram Stain (Respiratory) Rare Gram positive rods     Gram Stain (Respiratory) Rare budding yeast     Aerobic culture [[id number]] Collected: 11/02/22 1200    Order Status: Completed Specimen: Wound from Arm, Right Updated: 11/04/22 0927     Aerobic Bacterial Culture No significant isolate to date    Narrative:      Antecubital space    CSF culture [[id number]] Collected: 11/01/22 1513    Order Status: Completed Specimen: CSF (Spinal Fluid) from CSF Tap, Tube 2 Updated: 11/04/22 0733     CSF CULTURE No Growth to date     Gram Stain Result No WBC's, epithelial cells or organisms seen      11/01/2022  17:11 TN3    Blood culture (site 2) [[id number]] Collected: 10/29/22 1543    Order Status: Completed Specimen: Blood Updated: 11/04/22 0612     Blood Culture, Routine No growth after 5 days.    Narrative:      Site # 2, aerobic only    Blood culture (site 1) [[id number]] Collected: 10/29/22 1542    Order Status: Completed Specimen: Blood Updated: 11/04/22 0612     Blood Culture, Routine No growth after 5 days.    Narrative:      Site # 1, aerobic and anaerobic    Gram stain [[id number]] Collected: 11/01/22 1513    Order Status: Canceled Specimen: CSF (Spinal Fluid) from CSF Tap, Tube 2          SA== MSSA    Imaging Reviewed:  CXR  CT head   CT head/neck  CT abdomen/pelvis     MRI Brain:  1. Moderate to marked patient motion slightly limits evaluation but there is no acute infarction.  There is no acute intracranial hemorrhage or parenchymal mass.  There is generalized volume loss.  There is also mild nonspecific white matter change.   2. There is calcification in the posterolateral aspect of the right middle cranial fossa which is better demonstrated on comparison CT.  There is no definite associated enhancement as would be expected in a meningioma.  This could potentially represent very prominent incidental dural calcification or possible osteoma.  This is not acute.     Cardiology: EKG Qtc 496ms       IMPRESSION & PLAN     AMS + Fever, meningitis ruled out - NMS vs Serotoninergic syndrome in the setting of recent  increased dose of Zyprexa and other neuroleptics, favoring the latter  Blood cultures x 2 no growth   LP negative for meningitis   Respiratory culture  MSSA   Wound cultures 11/1 no growth    2. Aspiration pneumonia  Respiratory culture MSSA     Procal 0.7-->0.2    3. R arm mild cellulitis, improved  4. Rhabdomyolysis, improving   5. Transaminitis, trending down  6. PMHx: thyroid cancer, frontal temporal dementia, hypertension, hyperlipidemia, depression/anxiety     Recommendations:  Discontinue  Vancomycin   Continue Unasyn 3g IV q6h   Neurology recommendations appreciated   Aspiration precautions  Oral hygiene  Incentive spirometry  Wound care to R arm  Pain & Palliative to discuss goals of care  D/w Wife, nursing        Medical Decision Making during this encounter was  [_] Low Complexity  [_] Moderate Complexity  [xx] High Complexity

## 2022-11-05 NOTE — SUBJECTIVE & OBJECTIVE
Interval History: Patient seen and examined. Had some agitation overnight/morning pulled out nasopharyngeal airway. No major changes from yesterday. Wife and sister-in-law at bedside.    Review of Systems   Unable to perform ROS: Mental status change   Objective:     Vital Signs (Most Recent):  Temp: 97.3 °F (36.3 °C) (11/05/22 1131)  Pulse: 62 (11/05/22 1131)  Resp: 18 (11/05/22 1131)  BP: (!) 167/78 (11/05/22 1131)  SpO2: 100 % (11/05/22 1131)   Vital Signs (24h Range):  Temp:  [97.2 °F (36.2 °C)-99.3 °F (37.4 °C)] 97.3 °F (36.3 °C)  Pulse:  [52-83] 62  Resp:  [15-22] 18  SpO2:  [95 %-100 %] 100 %  BP: (124-167)/(65-89) 167/78     Weight: 56.7 kg (125 lb)  Body mass index is 19.58 kg/m².    Intake/Output Summary (Last 24 hours) at 11/5/2022 1140  Last data filed at 11/5/2022 1001  Gross per 24 hour   Intake 4126.09 ml   Output 2875 ml   Net 1251.09 ml      Physical Exam  Constitutional:       General: He is not in acute distress.     Appearance: He is ill-appearing.      Comments: In restraints   HENT:      Head: Normocephalic and atraumatic.   Cardiovascular:      Rate and Rhythm: Normal rate and regular rhythm.   Pulmonary:      Effort: Pulmonary effort is normal. No respiratory distress.      Breath sounds: No wheezing.   Abdominal:      General: Abdomen is flat. Bowel sounds are normal. There is no distension.      Palpations: Abdomen is soft.      Tenderness: There is no abdominal tenderness. There is no guarding or rebound.   Musculoskeletal:         General: No swelling or tenderness.      Cervical back: Neck supple. No tenderness.   Skin:     General: Skin is warm and dry.   Neurological:      GCS: GCS eye subscore is 1. GCS verbal subscore is 2. GCS motor subscore is 5.      Comments: Obtunded  Not able to cooperate  Making incomprehensible noises       Significant Labs: All pertinent labs within the past 24 hours have been reviewed.    Significant Imaging: I have reviewed all pertinent imaging  results/findings within the past 24 hours.

## 2022-11-05 NOTE — CARE UPDATE
11/05/22 0756   PRE-TX-O2   O2 Device (Oxygen Therapy) room air   SpO2 99 %   Pulse Oximetry Type Intermittent

## 2022-11-05 NOTE — CARE UPDATE
11/05/22 0720   Patient Assessment/Suction   Level of Consciousness (AVPU) responds to voice   Suction Method other (see comments)  (Sx not needed @ this time)   PRE-TX-O2   O2 Device (Oxygen Therapy) nasal cannula  (placed pt on room air)   $ Is the patient on Low Flow Oxygen? Yes   Flow (L/min) 1   SpO2 100 %   Pulse Oximetry Type Intermittent   $ Pulse Oximetry - Multiple Charge Pulse Oximetry - Multiple

## 2022-11-05 NOTE — PROGRESS NOTES
Pharmacokinetic Assessment Follow Up: IV Vancomycin    Vancomycin serum concentration assessment(s):    The trough level was drawn correctly and can be used to guide therapy at this time. The measurement is below the desired definitive target range of 15 to 20 mcg/mL.    Vancomycin Regimen Plan:    Change regimen to Vancomycin 750 mg IV every 8 hours with next serum trough concentration measured at 1 hour prior to 3rd dose on 11/6/22    Drug levels (last 3 results):  Recent Labs   Lab Result Units 11/05/22  1035   Vancomycin-Trough ug/mL 12.6       Pharmacy will continue to follow and monitor vancomycin.    Please contact pharmacy at extension [phone] for questions regarding this assessment.    Thank you for the consult,   Tran Cage       Patient brief summary:  Ronn Caballero Jr. is a 72 y.o. male initiated on antimicrobial therapy with IV Vancomycin for treatment of pneumonia      Drug Allergies:   Review of patient's allergies indicates:  No Known Allergies    Actual Body Weight:   56.7 kg    Renal Function:   Estimated Creatinine Clearance: 91.9 mL/min (based on SCr of 0.6 mg/dL).,     Dialysis Method (if applicable):  N/A    CBC (last 72 hours):  Recent Labs   Lab Result Units 11/03/22  0804 11/05/22  0832   WBC K/uL 9.89 7.23   Hemoglobin g/dL 11.2* 11.2*   Hematocrit % 34.3* 31.4*   Platelets K/uL 197 280   Gran % % 70.0 61.8   Lymph % % 10.8* 10.7*   Mono % % 14.5 16.3*   Eosinophil % % 3.0 5.7   Basophil % % 0.3 0.7   Differential Method  Automated Automated       Metabolic Panel (last 72 hours):  Recent Labs   Lab Result Units 11/03/22  0804 11/05/22  1035   Sodium mmol/L 137 139   Potassium mmol/L 4.1 3.7   Chloride mmol/L 109 108   CO2 mmol/L 22* 22*   Glucose mg/dL 91 95   BUN mg/dL 13 16   Creatinine mg/dL 0.7 0.6   Albumin g/dL 2.3*  --    Total Bilirubin mg/dL 0.7  --    Alkaline Phosphatase U/L 71  --    AST U/L 30  --    ALT U/L 36  --    Magnesium mg/dL 1.8  --    Phosphorus mg/dL 2.5*  --         Vancomycin Administrations:  vancomycin given in the last 96 hours                     vancomycin 1.25 g in dextrose 5% 250 mL IVPB (ready to mix) (mg) 1,250 mg New Bag 11/04/22 2311     1,250 mg New Bag  1140                    Microbiologic Results:  Microbiology Results (last 7 days)       Procedure Component Value Units Date/Time    CSF culture [[id number]] Collected: 11/01/22 1513    Order Status: Completed Specimen: CSF (Spinal Fluid) from CSF Tap, Tube 2 Updated: 11/05/22 0719     CSF CULTURE No Growth to date     Gram Stain Result No WBC's, epithelial cells or organisms seen      11/01/2022  17:11 TN3    Culture, Respiratory with Gram Stain [[id number]]  (Abnormal) Collected: 11/01/22 1744    Order Status: Completed Specimen: Respiratory from Tracheal Aspirate Updated: 11/04/22 1140     Respiratory Culture STAPHYLOCOCCUS AUREUS  Many  Susceptibility pending  Normal respiratory annetta also present       Gram Stain (Respiratory) Many WBC's     Gram Stain (Respiratory) Few Gram positive cocci     Gram Stain (Respiratory) Few Gram negative rods     Gram Stain (Respiratory) Rare Gram positive rods     Gram Stain (Respiratory) Rare budding yeast    Aerobic culture [[id number]] Collected: 11/02/22 1200    Order Status: Completed Specimen: Wound from Arm, Right Updated: 11/04/22 0927     Aerobic Bacterial Culture No significant isolate to date    Narrative:      Antecubital space    Blood culture (site 2) [[id number]] Collected: 10/29/22 1543    Order Status: Completed Specimen: Blood Updated: 11/04/22 0612     Blood Culture, Routine No growth after 5 days.    Narrative:      Site # 2, aerobic only    Blood culture (site 1) [[id number]] Collected: 10/29/22 1542    Order Status: Completed Specimen: Blood Updated: 11/04/22 0612     Blood Culture, Routine No growth after 5 days.    Narrative:      Site # 1, aerobic and anaerobic    Gram stain [[id number]] Collected: 11/01/22 1513    Order Status: Canceled Specimen: CSF  (Spinal Fluid) from CSF Tap, Tube 2

## 2022-11-05 NOTE — ASSESSMENT & PLAN NOTE
Working diagnosis in the setting of fever, muscle rigidity, AMS and exposure to multiple anti-psychotics   Infection workup in process   CSF findings overall unremarkable  Dantrolene IV Q8 hours down from Q6 - dosing per neurology  Cannot take bromocriptine due to NPO/mental status

## 2022-11-05 NOTE — ASSESSMENT & PLAN NOTE
Follows neurologist in [place] with recent increases in meds PTA - zyprexa 2.5mg to 10mg BID + Prozac and trileptal   Trileptal level <1   See above

## 2022-11-05 NOTE — PROGRESS NOTES
[facility] Medicine  Progress Note    Patient Name: Ronn Caballero Jr.  MRN: [id number]  Patient Class: IP- Inpatient   Admission Date: 10/29/2022  Length of Stay: 7 days  Attending Physician: Jefferson Mehta MD  Primary Care Provider: JELLY Decker        Subjective:     Principal Problem:Dementia with behavioral disturbance        HPI:  71 yo male with h/o thyroid cancer, frontal temporal lobe dementia, HTN, HLP, depression and anxiety transferred from [facility] due to 6 days of failure to thrive. It is reported from the facility that he was PEC/CEC (expires 11/3/22) due to combative behavior at home. History comes from the patient's spouse and sister-in -law at bedside. Patient follows a neurologist in [place] and spouse reports recent change in medications include increase of zyprexa from 2.5mg to 10mg BID. He was also started on gabapentin TID. Spouse denies recent fever, chills, cough, D/N/V.  Per the MAR from [place] he received Haldol x4 IM, benadryl and ativan IV x2. The facility reports he was not eating or drinking and no witnessed episodes of diarrhea or vomiting. On our workup he has Na153, elevated  AST, hematuria, WBC 17k with 2% bands, lactic acid 1.2. No source of infection on UA or chest xray. On exam he does show discomfort in RUQ and midepigastrium. Abd CT and lipase pending. Troponin mildly elevated 0.056 with some EKG changes.     Hospital medicine consulted for further medical management. Physical restraints will be used to protect patient as he is pulling at cardiac leads and peripheral IV. Neurology consulted. Needs tele sitter.       Overview/Hospital Course:  Patient admitted from [facility] facility with failure to thrive and progressive psychosis. He has h/o frontal temporal dementia. Overnight he was given IV ativan and has no improvement in behavior. Patient unable to participate in a telepsych visit.  Neurology was consulted on guidance to medications. Appears he was given a combination of meds in addition to his home medication and possibly polypharmacy resulted in psychosis. He does require restraints as he is a harm to himself. Will try to resume home medications if he can follow commands to swallow pills. Electrolyte replacement by IV. IVF for hypernatremia and elevated CP >3000. Monitoring on tele due to QT prolongation. The spouse and daughter at bedside and agree to palliative care discussion in regards to goals of care and advanced care planning.     Fevers persistent - started on amp, rocephin, vanc and acyclovir. LP completed and studies pending. Brain MRI: poor study due to motion artifact, generalized volume loss. Zyprexa dc'd, concern for NMS. Ativan IV Ordered. Restraints remain in place. Picc line placed to RUE. TPN ordered for nutrition. Unable to take oral meds and diet due to mental status. CK trending down 1700. Na 144.     Meningitis antibiotics dc'd, ativan weaning, TPN infusing, CXR did not show infiltrates, continues to spike fevers.  Psychiatry consult placed, though not able to do if patient cannot communicate on camera. Resp culture pending, unasyn IV should cover. Continuing dantrolene q8 hours. With drop in O2 sats on RA, place on supplemental O2. CK trending down. LA normal.     PEC/CEC dc'd/. Fevers resolved. Resp culture + staph - species pending. ID, neurology and psychiatry following. Depakote IV for mood stabilization. Prefer to not try anti-psychotics and SSRi in setting of possible NMS. Palliative care had meeting with patient/family made DNR.     Continues obtunded not following commands. TPN continued. To continue with supportive care and re-evaluate goals of care.      Interval History: Patient seen and examined. Had some agitation overnight/morning pulled out nasopharyngeal airway. No major changes from yesterday. Wife and sister-in-law at bedside.    Review of  Systems   Unable to perform ROS: Mental status change   Objective:     Vital Signs (Most Recent):  Temp: 97.3 °F (36.3 °C) (11/05/22 1131)  Pulse: 62 (11/05/22 1131)  Resp: 18 (11/05/22 1131)  BP: (!) 167/78 (11/05/22 1131)  SpO2: 100 % (11/05/22 1131)   Vital Signs (24h Range):  Temp:  [97.2 °F (36.2 °C)-99.3 °F (37.4 °C)] 97.3 °F (36.3 °C)  Pulse:  [52-83] 62  Resp:  [15-22] 18  SpO2:  [95 %-100 %] 100 %  BP: (124-167)/(65-89) 167/78     Weight: 56.7 kg (125 lb)  Body mass index is 19.58 kg/m².    Intake/Output Summary (Last 24 hours) at 11/5/2022 1140  Last data filed at 11/5/2022 1001  Gross per 24 hour   Intake 4126.09 ml   Output 2875 ml   Net 1251.09 ml      Physical Exam  Constitutional:       General: He is not in acute distress.     Appearance: He is ill-appearing.      Comments: In restraints   HENT:      Head: Normocephalic and atraumatic.   Cardiovascular:      Rate and Rhythm: Normal rate and regular rhythm.   Pulmonary:      Effort: Pulmonary effort is normal. No respiratory distress.      Breath sounds: No wheezing.   Abdominal:      General: Abdomen is flat. Bowel sounds are normal. There is no distension.      Palpations: Abdomen is soft.      Tenderness: There is no abdominal tenderness. There is no guarding or rebound.   Musculoskeletal:         General: No swelling or tenderness.      Cervical back: Neck supple. No tenderness.   Skin:     General: Skin is warm and dry.   Neurological:      GCS: GCS eye subscore is 1. GCS verbal subscore is 2. GCS motor subscore is 5.      Comments: Obtunded  Not able to cooperate  Making incomprehensible noises       Significant Labs: All pertinent labs within the past 24 hours have been reviewed.    Significant Imaging: I have reviewed all pertinent imaging results/findings within the past 24 hours.      Assessment/Plan:      * Dementia with behavioral disturbance  Sharp change in status. At this point mostly suspecting NMS  Little improvement noted  CT of  abdomen and pelvis - left kidney mass vs complex cyst, non obstructing stone   Head CT and brain MRi reviewed  IVF decreased 2/2 large volume intake overall  Neurology consulted and following  Psychiatry consult placed - patient cannot participate in current state - possible medication recommendation for FTD as to cause NMS in future   No haldol given here  Nightly ativan decreased (was taking home xanax)  Neurology recs: - resume home meds- if able to follow commands and swallow properly   Restraints continued due to danger to self  Tele monitoring   See fever section for CNS infx rule out  See NMS section as well    Frontotemporal dementia  Follows neurologist in [place] with recent increases in meds PTA - zyprexa 2.5mg to 10mg BID + Prozac and trileptal   Trileptal level <1   See above    Fever  CXR:  PICC placed in good position.  1.5 cm density projecting over the right mid lung feel likely represents overlapping shadows but follow-up chest x-rays are recommended to assure resolution and absence of a pulmonary lesion  CTA head and neck :  4. Right upper lobe ground-glass opacities in patchy consolidation, nonspecific and may reflect sequelae of small airways inflammation or infection.   UA reflex to culture - NTD  Blood culture- NTD  CSF - negative      Ampicillin, acyclovir, rocephin and vancomycin - dc'd  Resp did deep suction - pending species Staph and covered with Unasyn, vanc added back per ID to cover for MRSA, ok to dc if MSSA  Concern for NMS - see NMS section    NMS (neuroleptic malignant syndrome)  Working diagnosis in the setting of fever, muscle rigidity, AMS and exposure to multiple anti-psychotics   Infection workup in process   CSF findings overall unremarkable  Dantrolene IV Q8 hours down from Q6 - dosing per neurology  Cannot take bromocriptine due to NPO/mental status     Moderate malnutrition  Nutrition consulted. Most recent weight and BMI monitored-      Malnutrition (Moderate to  Severe)  Energy Intake (Malnutrition): less than 75% for greater than or equal to 1 month    Final Summary  Subcutaneous Fat Loss (Final Summary): mild protein-calorie malnutrition  Muscle Loss Evaluation (Final Summary): mild protein-calorie malnutrition         Measurements:  Wt Readings from Last 1 Encounters:   11/01/22 56.7 kg (125 lb)   Body mass index is 19.58 kg/m².    Recommendations: Recommendation/Intervention: 1) Start TPN via PICC when placed: D15 AA 5 @ 70 ml/hr + standard lipids ( provides 84 g protein ( 100% EPN), 1643 kcal ( 100% EEN)) 2) If pt remains NPO and NGT placement become possible or PEG needed longterm rec TF: Peptamen 1.5 Prebio @ 20 ml/hr advancing to goal of 45 ml/hr + 130 ml flush q 4 hr  ( provides 1620 kcal ( 100% EEN), 73 g protein ( 100% EPN),and 831 ml free water) 3) If AMS improves and able to advance diet rec. regular, texure per SLP to encourage PO intakes 4) weigh weekly  Goals: 1) Nutrition support meeting > 75% of needs at f/u    Patient has been screened and assessed by RD. RD will follow patient.    TPN until patient is alert for oral diet vs peg tube placement     Goals of care, counseling/discussion  Spouse decided on DNR   Appreciate palliative care meeting with family - ongoing    Bandemia  Leukocytosis - unknown etiology  LA 1.2 and no fever  UA - no signs of infections  CXR - clear   CT of abdomen  Trend CBC - WBC normal range and no bandemia on repeat - with IVF      Postoperative hypothyroidism  Resume synthroid - IV due to NPo status   FT4 - normal     Hypernatremia  Resolved with IVF    Failure to thrive in adult  Not taking PO  TPN for nutrition   Progression of dementia  Palliative care discussion - DNR  If he does not recover neurologically, spouse will need to make decision regarding feeding tube vs hospice    essential hypertension  Chronic, controlled  Not taking po  IV labetalol PRN       VTE Risk Mitigation (From admission, onward)         Ordered      enoxaparin injection 40 mg  Daily         10/29/22 1513     IP VTE HIGH RISK PATIENT  Once         10/29/22 1513     Place sequential compression device  Until discontinued         10/29/22 1513                Discharge Planning   TAYLOR: 11/7/2022 - unclear dispo    Code Status: DNR   Is the patient medically ready for discharge?:     Reason for patient still in hospital (select all that apply): Patient trending condition, Treatment and Consult recommendations  Discharge Plan A: Psychiatric hospital        Jefferson Mehta MD  Department of Hospital Medicine   [facility]

## 2022-11-05 NOTE — PLAN OF CARE
Patient progressing towards discharge.    Patient had no acute events noted. Patient remains completely disoriented and agitated overnight.  Patient does arouse to voice and moves all extremities.  Restraints to bilateral upper extremities remain in place.  Patient on 1 liter nasal cannula overnight.  TPN, Lipids and IV fluids continued per MD order.  Discussed POC with patient and family with verbalization of understanding.    Will continue to monitor.

## 2022-11-06 PROBLEM — M62.82 NON-TRAUMATIC RHABDOMYOLYSIS: Status: ACTIVE | Noted: 2022-11-06

## 2022-11-06 PROCEDURE — 25000003 PHARM REV CODE 250: Performed by: STUDENT IN AN ORGANIZED HEALTH CARE EDUCATION/TRAINING PROGRAM

## 2022-11-06 PROCEDURE — 63600175 PHARM REV CODE 636 W HCPCS: Performed by: STUDENT IN AN ORGANIZED HEALTH CARE EDUCATION/TRAINING PROGRAM

## 2022-11-06 PROCEDURE — 12000002 HC ACUTE/MED SURGE SEMI-PRIVATE ROOM

## 2022-11-06 PROCEDURE — 25000003 PHARM REV CODE 250: Performed by: HOSPITALIST

## 2022-11-06 PROCEDURE — 25000003 PHARM REV CODE 250: Performed by: INTERNAL MEDICINE

## 2022-11-06 PROCEDURE — 99231 PR SUBSEQUENT HOSPITAL CARE,LEVL I: ICD-10-PCS | Mod: S$GLB,,, | Performed by: INTERNAL MEDICINE

## 2022-11-06 PROCEDURE — 94761 N-INVAS EAR/PLS OXIMETRY MLT: CPT

## 2022-11-06 PROCEDURE — A4216 STERILE WATER/SALINE, 10 ML: HCPCS | Performed by: HOSPITALIST

## 2022-11-06 PROCEDURE — 31720 CLEARANCE OF AIRWAYS: CPT

## 2022-11-06 PROCEDURE — A4217 STERILE WATER/SALINE, 500 ML: HCPCS | Performed by: INTERNAL MEDICINE

## 2022-11-06 PROCEDURE — 63600175 PHARM REV CODE 636 W HCPCS: Performed by: HOSPITALIST

## 2022-11-06 PROCEDURE — B4185 PARENTERAL SOL 10 GM LIPIDS: HCPCS | Performed by: STUDENT IN AN ORGANIZED HEALTH CARE EDUCATION/TRAINING PROGRAM

## 2022-11-06 PROCEDURE — 99231 SBSQ HOSP IP/OBS SF/LOW 25: CPT | Mod: S$GLB,,, | Performed by: INTERNAL MEDICINE

## 2022-11-06 RX ADMIN — SCOPALAMINE 1 PATCH: 1 PATCH, EXTENDED RELEASE TRANSDERMAL at 02:11

## 2022-11-06 RX ADMIN — LEVOTHYROXINE SODIUM 44 MCG: 20 INJECTION, SOLUTION INTRAVENOUS at 09:11

## 2022-11-06 RX ADMIN — DEXTROSE 250 MG: 50 INJECTION, SOLUTION INTRAVENOUS at 01:11

## 2022-11-06 RX ADMIN — I.V. FAT EMULSION 250 ML: 20 EMULSION INTRAVENOUS at 09:11

## 2022-11-06 RX ADMIN — MUPIROCIN: 20 OINTMENT TOPICAL at 09:11

## 2022-11-06 RX ADMIN — DEXTROSE 250 MG: 50 INJECTION, SOLUTION INTRAVENOUS at 04:11

## 2022-11-06 RX ADMIN — ENOXAPARIN SODIUM 40 MG: 100 INJECTION SUBCUTANEOUS at 04:11

## 2022-11-06 RX ADMIN — DANTROLENE SODIUM 55 MG: 20 INJECTION INTRAVENOUS at 10:11

## 2022-11-06 RX ADMIN — SODIUM CHLORIDE, PRESERVATIVE FREE 10 ML: 5 INJECTION INTRAVENOUS at 12:11

## 2022-11-06 RX ADMIN — DEXTROSE 250 MG: 50 INJECTION, SOLUTION INTRAVENOUS at 09:11

## 2022-11-06 RX ADMIN — AMPICILLIN SODIUM AND SULBACTAM SODIUM 3 G: 2; 1 INJECTION, POWDER, FOR SOLUTION INTRAMUSCULAR; INTRAVENOUS at 09:11

## 2022-11-06 RX ADMIN — AMPICILLIN SODIUM AND SULBACTAM SODIUM 3 G: 2; 1 INJECTION, POWDER, FOR SOLUTION INTRAMUSCULAR; INTRAVENOUS at 03:11

## 2022-11-06 RX ADMIN — AMPICILLIN SODIUM AND SULBACTAM SODIUM 3 G: 2; 1 INJECTION, POWDER, FOR SOLUTION INTRAMUSCULAR; INTRAVENOUS at 02:11

## 2022-11-06 RX ADMIN — ASCORBIC ACID, VITAMIN A PALMITATE, CHOLECALCIFEROL, THIAMINE HYDROCHLORIDE, RIBOFLAVIN-5 PHOSPHATE SODIUM, PYRIDOXINE HYDROCHLORIDE, NIACINAMIDE, DEXPANTHENOL, ALPHA-TOCOPHEROL ACETATE, VITAMIN K1, FOLIC ACID, BIOTIN, CYANOCOBALAMIN: 200; 3300; 200; 6; 3.6; 6; 40; 15; 10; 150; 600; 60; 5 INJECTION, SOLUTION INTRAVENOUS at 09:11

## 2022-11-06 NOTE — PROGRESS NOTES
Ronn VAISHNAVI Caballero Jr. [id number] is a 72 y.o. male who has been consulted for vancomycin dosing.    Pharmacy consult for vancomycin dosing in no longer required.  Vancomycin was discontinued.    Thank you for allowing us to participate in this patient's care.     Aubrey Fisher  Pharm.D    [facility]    [phone]

## 2022-11-06 NOTE — PLAN OF CARE
11/05/22 2015   Patient Assessment/Suction   Level of Consciousness (AVPU) responds to voice   Respiratory Effort Normal;Unlabored   Expansion/Accessory Muscles/Retractions expansion symmetric   All Lung Fields Breath Sounds coarse  (cleared with suction)   Cough Frequency with stimulation   Cough Type loose   Suction Method tracheal   $ Suction Charges NT Suction Procedure   Secretions Amount moderate   Secretions Color pale;yellow   Secretions Characteristics thick   PRE-TX-O2   O2 Device (Oxygen Therapy) room air   SpO2 96 %   Pulse Oximetry Type Intermittent

## 2022-11-06 NOTE — ASSESSMENT & PLAN NOTE
Sharp change in status on admit from baseline. At this point mostly suspecting NMS  Little to no improvement noted  CT of abdomen and pelvis - left kidney mass vs complex cyst, non obstructing stone   Head CT and brain MRi reviewed  IVF decreased 2/2 large volume intake overall  Neurology consulted and following  Psychiatry consult placed - patient cannot participate in current state - possible medication recommendation for FTD as to cause NMS in future   No haldol given here  Nightly ativan decreased (was taking home xanax)  Restraints continued due to danger to self  Tele monitoring   See fever section for CNS infx rule out  See NMS section as well

## 2022-11-06 NOTE — PROGRESS NOTES
Progress Note  Infectious Disease    Reason for Consult:  AMS + Fever rule out meningitis     HPI: Ronn Caballero Jr. is a 72 y.o. male with past medical history of thyroid cancer, frontal temporal dementia, hypertension, hyperlipidemia, depression/anxiety who was transferred from [facility] due to 6 days of failure to thrive.  History obtain from wife at bedside.  She states patient started acting aggressive 2 weeks ago, she had to call the ambulance and patient was placed at [place].  Of note patient was taking Zyprexa 2.5 mg daily and dose was increased 10 mg twice a day.  He was also started on gabapentin 3 times a day.  Wife states she was not able to communicate with the facility for about a week until she called her daughter for assistance and was advised to bring the patient to the emergency room.  As per records, patient received Haldol x4, Benadryl, and Ativan x2.  Reports of not eating, not drinking noted.  No personal history of seizures.  She states patient has shuffled gait at baseline, last time she saw him at home he did not have cough, shortness of breath, nausea or vomiting, or any complaint before being transferred to psychiatric facility.    In the ER, patient hypertensive, afebrile   Labs on admission, leukocytosis 17.4, left shift 85%, bands 2%, H&H 13.7/41.9, platelet count 274   Hypernatremia 153  Creatinine 1.2   /ALT 42   CPK 3221   Lactic acid 1.2   Procalcitonin 0.3  UA with no pyuria, occasional bacteria, negative for UTI   Blood cultures x2 no growth to date, pending final   Chest x-ray clear   CT head with calcified mass in the floor of the right middle cranial fossa, most suggestive of petrous apex meningioma.  Incidental finding.  Essential negative study, aside from atrophy.    CT abdomen/pelvis limited by motion, cyst on left kidney.  Constipation.  Nonobstructive renal stone.    Hospital course complicated by fever of  101 10/30 and 102.5 today.   "ID consult to rule out meningitis. Empirically started on Vancomycin and Ceftriaxone IV.     INTERVAL HISTORY:  11/1: Interim reviewed, patient seen and examined at beside, remains somnolent, not following commands, gargling. Hypertensive, Tmax 102.3 overnight, currently afebrile. Discussed with neurology, will try empiric dantrolene, ordered MRI brain and attempt LP. Labs reviewed, WBC 9.8, left shift 75.4%, creatinie stable. Phsophorus 1.7, AST 56 trending down, ALT normal. Micro reviewed, no growth so far.     11/2:  Interim reviewed, patient seen examined at bedside.  Remains somnolent, not following commands.  Hypertensive, T-max 101.4° yesterday, currently afebrile.  Had LP performed yesterday, WBC 4, normal glucose and protein, negative for meningitis.  Discussed with respiratory therapy, copious amount of purulent secretions suctioned, specimen sent for culture.  Labs reviewed, white count 10.4, left shift 76%, H&H 11.2/33.7, platelet count 228.  Stable kidney function, albumin 2.4, normal LFTs.  CPK trending down 06/03/2005, lactic acid 1, procalcitonin 0.7, suspecting ongoing aspiration pneumonia.    11/3: Interim reviewed, patient seen and examined at bedside, wife and son present. Patient remains with his eyes closed, responding to verbal commands, "what, yes'. Hemodynamically stable, afebrile in the last 36h, tolerating Dantrolene.  Adequate urine output, Fernandes remains in place, passing gas, no bowel movements yet.  Labs reviewed, stable white count, no left shift, H&H 11.2/34.3, platelet count 197.  Chest x-ray clear.     11/4:  Interim reviewed, patient seen examined at bedside, remains obtunded, with spontaneous jerky movements, wife at bedside.  Patient has been afebrile since 11/2, labs reviewed stable.  Procalcitonin now normal.  Micro reviewed respiratory culture grew Staph aureus, sensitivities pending.  11/05/22 confused,sleeps most of the time. afebrile  11/06/2022  confused. Family at " bedside. Wife understands he has a poor prognosis, but at the same time she is in denial, and hopefull he would get better    Antibiotics (From admission, onward)      Start     Stop Route Frequency Ordered    11/05/22 0930  mupirocin 2 % ointment         11/10 0859 Nasl 2 times daily 11/05/22 0827    11/02/22 1230  ampicillin-sulbactam 3 g in sodium chloride 0.9 % 100 mL IVPB (ready to mix system)         -- IV Every 6 hours (non-standard times) 11/02/22 0951          Antifungals (From admission, onward)      None          Antivirals (From admission, onward)      None            Review of patient's allergies indicates:  No Known Allergies  Past Medical History:   Diagnosis Date    Cancer     Dementia     Depression     Hypertension      Past Surgical History:   Procedure Laterality Date    Lymph node Ca removed       Social History     Tobacco Use    Smoking status: Not on file    Smokeless tobacco: Not on file   Substance Use Topics    Alcohol use: Not on file        No family history on file.    Pertinent medications noted: Zyprexa/haldol/Ativan    Review of Systems: Patient somnolent, unable to follow commands.     Outdoor activities: From home, recently admitted to [facility] for abnormal behavior in the setting of frontotemporal dementia.  Travel: None  Implants: None  Antibiotic History: Vanco/Ceftriaxone    EXAM & DIAGNOSTICS REVIEWED:   Vitals:     Temp:  [97.1 °F (36.2 °C)-98.6 °F (37 °C)]   Temp: 98.2 °F (36.8 °C) (11/05/22 2326)  Pulse: (!) 57 (11/05/22 2326)  Resp: 16 (11/05/22 2326)  BP: 138/83 (11/05/22 2326)  SpO2: 98 % (11/06/22 0405)    Intake/Output Summary (Last 24 hours) at 11/6/2022 0433  Last data filed at 11/5/2022 1939  Gross per 24 hour   Intake 2715.82 ml   Output 2350 ml   Net 365.82 ml       General:  Frail, chronically-ill appearing, obtunded with spontaneous jerks, O2 by Nc 2L  Eyes:  Eyes shut, unable to open  ENT:  Moist mucus membranes, dry mouth and lips , mouth breather  Neck:  Supple    Lungs: Coarse breath sounds b/l  Heart:  S1/S2+, regular rhythm, no murmurs  Abd:  +BS, soft, non tender, non distended, no rebound  :  Voids  Musc:  Generalized muscle wasting, joints without effusion, swelling,  erythema, synovitis  Skin:  Warm, R arm with resolved redness on AC fossa, seems like prior infiltrated IV, scab noted, see pic below, dressing in place, improved  Wound:   Neuro:  Mumbling, not following commands, sleeps most of the time  Psych:    Lymphatic:       Extrem: No LE edema b/l  VAD:  PICC line L 11/1      Isolation: None    11/2:          11/1:        10/31:        General Labs reviewed:  Recent Labs   Lab 11/02/22  0453 11/03/22  0804 11/05/22  0832   WBC 10.40 9.89 7.23   HGB 11.2* 11.2* 11.2*   HCT 33.7* 34.3* 31.4*    197 280       Recent Labs   Lab 11/01/22  0447 11/02/22  0452 11/03/22  0804 11/05/22  1035    140 137 139   K 2.9* 3.7 4.1 3.7   * 110 109 108   CO2 22* 21* 22* 22*   BUN 14 13 13 16   CREATININE 0.9 0.8 0.7 0.6   CALCIUM 8.7 8.7 8.8 8.5*   PROT 5.6* 5.6* 5.4*  --    BILITOT 0.7 0.4 0.7  --    ALKPHOS 79 83 71  --    ALT 42 38 36  --    AST 56* 40 30  --      No results for input(s): CRP in the last 168 hours.  No results for input(s): SEDRATE in the last 168 hours.    Estimated Creatinine Clearance: 91.9 mL/min (based on SCr of 0.6 mg/dL).     Micro:  Microbiology Results (last 7 days)       Procedure Component Value Units Date/Time    Culture, Respiratory with Gram Stain [[id number]]  (Abnormal)  (Susceptibility) Collected: 11/01/22 5480    Order Status: Completed Specimen: Respiratory from Tracheal Aspirate Updated: 11/05/22 1358     Respiratory Culture STAPHYLOCOCCUS AUREUS  Many  Normal respiratory annetta also present       Gram Stain (Respiratory) Many WBC's     Gram Stain (Respiratory) Few Gram positive cocci     Gram Stain (Respiratory) Few Gram negative rods     Gram Stain (Respiratory) Rare Gram positive rods     Gram Stain (Respiratory) Rare  budding yeast    Aerobic culture [[id number]] Collected: 11/02/22 1200    Order Status: Completed Specimen: Wound from Arm, Right Updated: 11/05/22 1229     Aerobic Bacterial Culture No significant isolate    Narrative:      Antecubital space    CSF culture [[id number]] Collected: 11/01/22 1513    Order Status: Completed Specimen: CSF (Spinal Fluid) from CSF Tap, Tube 2 Updated: 11/05/22 0719     CSF CULTURE No Growth to date     Gram Stain Result No WBC's, epithelial cells or organisms seen      11/01/2022  17:11 TN3    Blood culture (site 2) [[id number]] Collected: 10/29/22 1543    Order Status: Completed Specimen: Blood Updated: 11/04/22 0612     Blood Culture, Routine No growth after 5 days.    Narrative:      Site # 2, aerobic only    Blood culture (site 1) [[id number]] Collected: 10/29/22 1542    Order Status: Completed Specimen: Blood Updated: 11/04/22 0612     Blood Culture, Routine No growth after 5 days.    Narrative:      Site # 1, aerobic and anaerobic    Gram stain [[id number]] Collected: 11/01/22 1513    Order Status: Canceled Specimen: CSF (Spinal Fluid) from CSF Tap, Tube 2           Staphylococcus aureus      CULTURE, RESPIRATORY   Clindamycin <=0.5 mcg/mL Sensitive   Erythromycin >4 mcg/mL Resistant   Oxacillin <=0.25 mcg/mL Sensitive   Penicillin 2 mcg/mL Resistant   Tetracycline <=4 mcg/mL Sensitive   Trimeth/Sulfa <=0.5/9.5 m... Sensitive       Imaging Reviewed:  CXR  CT head   CT head/neck  CT abdomen/pelvis     MRI Brain:  1. Moderate to marked patient motion slightly limits evaluation but there is no acute infarction.  There is no acute intracranial hemorrhage or parenchymal mass.  There is generalized volume loss.  There is also mild nonspecific white matter change.   2. There is calcification in the posterolateral aspect of the right middle cranial fossa which is better demonstrated on comparison CT.  There is no definite associated enhancement as would be expected in a meningioma.  This could  potentially represent very prominent incidental dural calcification or possible osteoma.  This is not acute.     Cardiology: EKG Qtc 496ms       IMPRESSION & PLAN     AMS + Fever, meningitis ruled out - NMS vs Serotoninergic syndrome in the setting of recent increased dose of Zyprexa and other neuroleptics  Blood cultures x 2 no growth   LP negative for meningitis   Respiratory culture  MSSA, possible pneumonia?  Wound cultures 11/1 no growth    2. Aspiration pneumonia  Respiratory culture MSSA     Procal 0.7-->0.2    3. R arm mild cellulitis, improved  4. Rhabdomyolysis, improving   5. Transaminitis, trending down  6. PMHx: thyroid cancer, frontal temporal dementia, hypertension, hyperlipidemia, depression/anxiety       Recommendations:    D6 - Unasyn 3g IV q6h   May switch to keflex 1 gram tid , when able ot take po  Complete total of 10-14 days of antibiotics    Aspiration precautions  Oral hygiene  Incentive spirometry  Wound care to R arm   Poor prognosis due to his neurologic       Medical Decision Making during this encounter was  [_] Low Complexity  [_] Moderate Complexity  [xx] High Complexity

## 2022-11-06 NOTE — PROGRESS NOTES
[facility] Hospital Medicine  Progress Note    Patient Name: Ronn Caballero Jr.  MRN: [id number]  Patient Class: IP- Inpatient   Admission Date: 10/29/2022  Length of Stay: 8 days  Attending Physician: Jefferson Mehta MD  Primary Care Provider: JELLY Decker        Subjective:     Principal Problem:Dementia with behavioral disturbance        HPI:  73 yo male with h/o thyroid cancer, frontal temporal lobe dementia, HTN, HLP, depression and anxiety transferred from [facility] due to 6 days of failure to thrive. It is reported from the facility that he was PEC/CEC (expires 11/3/22) due to combative behavior at home. History comes from the patient's spouse and sister-in -law at bedside. Patient follows a neurologist in [place] and spouse reports recent change in medications include increase of zyprexa from 2.5mg to 10mg BID. He was also started on gabapentin TID. Spouse denies recent fever, chills, cough, D/N/V.  Per the MAR from [place] he received Haldol x4 IM, benadryl and ativan IV x2. The facility reports he was not eating or drinking and no witnessed episodes of diarrhea or vomiting. On our workup he has Na153, elevated  AST, hematuria, WBC 17k with 2% bands, lactic acid 1.2. No source of infection on UA or chest xray. On exam he does show discomfort in RUQ and midepigastrium. Abd CT and lipase pending. Troponin mildly elevated 0.056 with some EKG changes.     Hospital medicine consulted for further medical management. Physical restraints will be used to protect patient as he is pulling at cardiac leads and peripheral IV. Neurology consulted. Needs tele sitter.       Overview/Hospital Course:  Patient admitted from [facility] facility with failure to thrive and progressive psychosis. He has h/o frontal temporal dementia. Overnight he was given IV ativan and has no improvement in behavior. Patient unable to participate in a telepsych visit.  Neurology was consulted on guidance to medications. Appears he was given a combination of meds in addition to his home medication and possibly polypharmacy resulted in psychosis. He does require restraints as he is a harm to himself. Will try to resume home medications if he can follow commands to swallow pills. Electrolyte replacement by IV. IVF for hypernatremia and elevated CP >3000. Monitoring on tele due to QT prolongation. The spouse and daughter at bedside and agree to palliative care discussion in regards to goals of care and advanced care planning.     Fevers persistent - started on amp, rocephin, vanc and acyclovir. LP completed and studies pending. Brain MRI: poor study due to motion artifact, generalized volume loss. Zyprexa dc'd, concern for NMS. Ativan IV Ordered. Restraints remain in place. Picc line placed to RUE. TPN ordered for nutrition. Unable to take oral meds and diet due to mental status. CK trending down 1700. Na 144.     Meningitis antibiotics dc'd, ativan weaning, TPN infusing, CXR did not show infiltrates, continues to spike fevers.  Psychiatry consult placed, though not able to do if patient cannot communicate on camera. Resp culture pending, unasyn IV should cover. Continuing dantrolene q8 hours. With drop in O2 sats on RA, place on supplemental O2. CK trending down. LA normal.     PEC/CEC dc'd/. Fevers resolved. Resp culture + staph - species pending. ID, neurology and psychiatry following. Depakote IV for mood stabilization. Prefer to not try anti-psychotics and SSRi in setting of possible NMS. Palliative care had meeting with patient/family made DNR.     Continues obtunded not following commands. TPN and restraints continued. To continue with supportive care and re-evaluate goals of care.      Interval History: Patient seen and examined. NAEON. No changes from yesterday. Continues unable to participate in interview. Continues requiring restraints and TPN.    Review of Systems    Unable to perform ROS: Mental status change   Objective:     Vital Signs (Most Recent):  Temp: 97.6 °F (36.4 °C) (11/06/22 0741)  Pulse: (!) 52 (11/06/22 0741)  Resp: 17 (11/06/22 0741)  BP: (!) 166/70 (11/06/22 0741)  SpO2: 100 % (11/06/22 0741)   Vital Signs (24h Range):  Temp:  [97.1 °F (36.2 °C)-98.6 °F (37 °C)] 97.6 °F (36.4 °C)  Pulse:  [52-64] 52  Resp:  [16-18] 17  SpO2:  [94 %-100 %] 100 %  BP: (138-174)/(70-83) 166/70     Weight: 56.7 kg (125 lb)  Body mass index is 19.58 kg/m².    Intake/Output Summary (Last 24 hours) at 11/6/2022 1009  Last data filed at 11/6/2022 0920  Gross per 24 hour   Intake 3543.6 ml   Output 2900 ml   Net 643.6 ml      Physical Exam  Constitutional:       General: He is not in acute distress.     Appearance: He is ill-appearing.      Comments: In restraints   HENT:      Head: Normocephalic and atraumatic.   Cardiovascular:      Rate and Rhythm: Normal rate and regular rhythm.   Pulmonary:      Effort: Pulmonary effort is normal. No respiratory distress.      Breath sounds: No wheezing.   Abdominal:      General: Abdomen is flat. Bowel sounds are normal. There is no distension.      Palpations: Abdomen is soft.      Tenderness: There is no abdominal tenderness. There is no guarding or rebound.   Musculoskeletal:         General: No swelling or tenderness.      Cervical back: Neck supple. No tenderness.   Skin:     General: Skin is warm and dry.   Neurological:      GCS: GCS eye subscore is 1. GCS verbal subscore is 2. GCS motor subscore is 5.      Comments: Obtunded  Not able to cooperate  Making incomprehensible noises  Overall increased tone in musculature       Significant Labs: All pertinent labs within the past 24 hours have been reviewed.    Significant Imaging: I have reviewed all pertinent imaging results/findings within the past 24 hours.      Assessment/Plan:      * Dementia with behavioral disturbance  Sharp change in status on admit from baseline. At this point mostly  suspecting NMS  Little to no improvement noted  CT of abdomen and pelvis - left kidney mass vs complex cyst, non obstructing stone   Head CT and brain MRi reviewed  IVF decreased 2/2 large volume intake overall  Neurology consulted and following  Psychiatry consult placed - patient cannot participate in current state - possible medication recommendation for FTD as to cause NMS in future   No haldol given here  Nightly ativan decreased (was taking home xanax)  Restraints continued due to danger to self  Tele monitoring   See fever section for CNS infx rule out  See NMS section as well    Frontotemporal dementia  Follows neurologist in [place] with recent increases in meds PTA - zyprexa 2.5mg to 10mg BID + Prozac and trileptal   Trileptal level <1   See above    Fever  CXR:  PICC placed in good position.  1.5 cm density projecting over the right mid lung feel likely represents overlapping shadows but follow-up chest x-rays are recommended to assure resolution and absence of a pulmonary lesion  CTA head and neck :  4. Right upper lobe ground-glass opacities in patchy consolidation, nonspecific and may reflect sequelae of small airways inflammation or infection.   UA reflex to culture - NTD  Blood culture- NTD  CSF - negative      Ampicillin, acyclovir, rocephin and vancomycin - dc'd  Resp did deep suction - MSSA covered with Unasyn, vanc dc'd now again  Concern for NMS - see NMS section    NMS (neuroleptic malignant syndrome)  Working diagnosis in the setting of fever, muscle rigidity, AMS and exposure to multiple anti-psychotics   Infection workup in process - MSSA pna covered w/ abx but otherwise not yielding etiology  CSF findings overall unremarkable  Dantrolene IV q6 > q8 > q12 - dosing per neurology  Cannot take bromocriptine due to NPO/mental status     Non-traumatic rhabdomyolysis  resolved    Moderate malnutrition  Nutrition consulted. Most recent weight and BMI monitored-      Malnutrition (Moderate to  Severe)  Energy Intake (Malnutrition): less than 75% for greater than or equal to 1 month    Final Summary  Subcutaneous Fat Loss (Final Summary): mild protein-calorie malnutrition  Muscle Loss Evaluation (Final Summary): mild protein-calorie malnutrition         Measurements:  Wt Readings from Last 1 Encounters:   11/01/22 56.7 kg (125 lb)   Body mass index is 19.58 kg/m².    Recommendations: Recommendation/Intervention: 1) Start TPN via PICC when placed: D15 AA 5 @ 70 ml/hr + standard lipids ( provides 84 g protein ( 100% EPN), 1643 kcal ( 100% EEN)) 2) If pt remains NPO and NGT placement become possible or PEG needed longterm rec TF: Peptamen 1.5 Prebio @ 20 ml/hr advancing to goal of 45 ml/hr + 130 ml flush q 4 hr  ( provides 1620 kcal ( 100% EEN), 73 g protein ( 100% EPN),and 831 ml free water) 3) If AMS improves and able to advance diet rec. regular, texure per SLP to encourage PO intakes 4) weigh weekly  Goals: 1) Nutrition support meeting > 75% of needs at f/u    Patient has been screened and assessed by RD. RD will follow patient.    TPN until patient is alert for oral diet vs peg tube placement     Goals of care, counseling/discussion  Spouse decided on DNR   Appreciate palliative care meeting with family - ongoing    Bandemia  See fever section    Postoperative hypothyroidism  Resume synthroid - IV due to NPo status   FT4 - normal     Hypernatremia  Resolved with IVF    Failure to thrive in adult  Not taking PO  TPN for nutrition   Progression of dementia  Palliative care discussion - DNR  If he does not recover neurologically, spouse will need to make decision regarding feeding tube vs hospice    essential hypertension  Chronic, controlled  Not taking po  IV labetalol PRN       VTE Risk Mitigation (From admission, onward)         Ordered     enoxaparin injection 40 mg  Daily         10/29/22 1513     IP VTE HIGH RISK PATIENT  Once         10/29/22 1513     Place sequential compression device  Until  discontinued         10/29/22 1513                Discharge Planning   TAYLOR: 11/7/2022 - unclear    Code Status: DNR   Is the patient medically ready for discharge?:     Reason for patient still in hospital (select all that apply): Patient trending condition and Consult recommendations  Discharge Plan A: Psychiatric hospital        Jefferson Mehta MD  Department of Hospital Medicine   [facility]

## 2022-11-06 NOTE — ASSESSMENT & PLAN NOTE
Working diagnosis in the setting of fever, muscle rigidity, AMS and exposure to multiple anti-psychotics   Infection workup in process - MSSA pna covered w/ abx but otherwise not yielding etiology  CSF findings overall unremarkable  Dantrolene IV q6 > q8 > q12 - dosing per neurology  Cannot take bromocriptine due to NPO/mental status

## 2022-11-06 NOTE — CARE UPDATE
11/06/22 0725   Patient Assessment/Suction   Level of Consciousness (AVPU) responds to voice   All Lung Fields Breath Sounds clear   LLL Breath Sounds diminished   RLL Breath Sounds diminished   $ Suction Charges   (BS clear. Sx not needed @ this time)   PRE-TX-O2   O2 Device (Oxygen Therapy) room air   SpO2 100 %   Pulse Oximetry Type Intermittent   $ Pulse Oximetry - Multiple Charge Pulse Oximetry - Multiple

## 2022-11-06 NOTE — ASSESSMENT & PLAN NOTE
Follows neurologist in [place] with recent increases in meds PTA - zyprexa 2.5mg to 10mg BID + Prozac and trileptal   Trileptal level <1   See above

## 2022-11-06 NOTE — SUBJECTIVE & OBJECTIVE
Interval History: Patient seen and examined. NAEON. No changes from yesterday. Continues unable to participate in interview. Continues requiring restraints and TPN.    Review of Systems   Unable to perform ROS: Mental status change   Objective:     Vital Signs (Most Recent):  Temp: 97.6 °F (36.4 °C) (11/06/22 0741)  Pulse: (!) 52 (11/06/22 0741)  Resp: 17 (11/06/22 0741)  BP: (!) 166/70 (11/06/22 0741)  SpO2: 100 % (11/06/22 0741)   Vital Signs (24h Range):  Temp:  [97.1 °F (36.2 °C)-98.6 °F (37 °C)] 97.6 °F (36.4 °C)  Pulse:  [52-64] 52  Resp:  [16-18] 17  SpO2:  [94 %-100 %] 100 %  BP: (138-174)/(70-83) 166/70     Weight: 56.7 kg (125 lb)  Body mass index is 19.58 kg/m².    Intake/Output Summary (Last 24 hours) at 11/6/2022 1009  Last data filed at 11/6/2022 0920  Gross per 24 hour   Intake 3543.6 ml   Output 2900 ml   Net 643.6 ml      Physical Exam  Constitutional:       General: He is not in acute distress.     Appearance: He is ill-appearing.      Comments: In restraints   HENT:      Head: Normocephalic and atraumatic.   Cardiovascular:      Rate and Rhythm: Normal rate and regular rhythm.   Pulmonary:      Effort: Pulmonary effort is normal. No respiratory distress.      Breath sounds: No wheezing.   Abdominal:      General: Abdomen is flat. Bowel sounds are normal. There is no distension.      Palpations: Abdomen is soft.      Tenderness: There is no abdominal tenderness. There is no guarding or rebound.   Musculoskeletal:         General: No swelling or tenderness.      Cervical back: Neck supple. No tenderness.   Skin:     General: Skin is warm and dry.   Neurological:      GCS: GCS eye subscore is 1. GCS verbal subscore is 2. GCS motor subscore is 5.      Comments: Obtunded  Not able to cooperate  Making incomprehensible noises  Overall increased tone in musculature       Significant Labs: All pertinent labs within the past 24 hours have been reviewed.    Significant Imaging: I have reviewed all pertinent  imaging results/findings within the past 24 hours.

## 2022-11-06 NOTE — PLAN OF CARE
Plan of care reviewed with pt. Pt unable to verbalize understanding. Patient is disoriented x 4. Continuous cardiac monitoring in place as ordered. A-febrile throughout the shift. ABX administered as scheduled. Meds given per MAR. IVF infusing as ordered. Left upper arm PICC in place infusing without difficulty, no s/s of infection noted to site, dsg CDI. No s/s of pain or discomfort. Purposeful hourly/q2hr rounding done during shift to promote patient safety. NAD noted. Safety maintained with side rails up x3, bed wheels locked, bed in lowest positioned, call light in reach. Pt remains free of falls. No further needs expressed at this time. Will continue to monitor.

## 2022-11-06 NOTE — ASSESSMENT & PLAN NOTE
CXR:  PICC placed in good position.  1.5 cm density projecting over the right mid lung feel likely represents overlapping shadows but follow-up chest x-rays are recommended to assure resolution and absence of a pulmonary lesion  CTA head and neck :  4. Right upper lobe ground-glass opacities in patchy consolidation, nonspecific and may reflect sequelae of small airways inflammation or infection.   UA reflex to culture - NTD  Blood culture- NTD  CSF - negative      Ampicillin, acyclovir, rocephin and vancomycin - dc'd  Resp did deep suction - MSSA covered with Unasyn, vanc dc'd now again  Concern for NMS - see NMS section

## 2022-11-06 NOTE — PROGRESS NOTES
[facility]  Progress Note  Date: 2022 9:36 AM            Patient Name: Ronn Caballero Jr.   MRN: [id number]   : 1950    AGE: 72 y.o.    LOS: 8 days Hospital Day: 9  Admit date: 10/29/2022 10:53 AM         HPI per EMR: Ronn Caballero Jr. is a 71 yo male with h/o thyroid cancer, frontal temporal lobe dementia, HTN, HLP, depression and anxiety transferred from [facility] due to 6 days of failure to thrive. It is reported from the facility that he was PEC/CEC (expires 11/3/22) due to combative behavior at home. History comes from the patient's spouse and sister-in -law at bedside. Patient follows a neurologist in [place] and spouse reports recent change in medications include increase of zyprexa from 2.5mg to 10mg BID. He was also started on gabapentin TID. Spouse denies recent fever, chills, cough, D/N/V.  Per the MAR from [place] he received Haldol x4 IM, benadryl and ativan IV x2. The facility reports he was not eating or drinking and no witnessed episodes of diarrhea or vomiting. On our workup he has Na153, elevated  AST, hematuria, WBC 17k with 2% bands, lactic acid 1.2. No source of infection on UA or chest xray. On exam he does show discomfort in RUQ and midepigastrium. Abd CT and lipase pending. Troponin mildly elevated 0.056 with some EKG changes.      Hospital medicine consulted for further medical management. Physical restraints will be used to protect patient as he is pulling at cardiac leads and peripheral IV. Neurology consulted. Needs tele sitter.     10/31/2022:  Patient was seen and examined by me this morning.  He is encephalopathic and only able to tell me his name on repeated questioning.  Patient's wife was at bedside and states that he has a history of frontotemporal dementia however last  he suddenly became altered and combative for which he was taken to the hospital and then to [facility].    His CK  has been elevated and he has been encephalopathic and febrile.     11/01/2022:  Patient was seen examined by me this morning.  He remains to be somnolent and exam unchanged.    11/02/2022: Patient was seen and examined by me. He is somnolent and does open eyes to stimulus and does not follow commands or answer questions.  Patient's wife is at bedside and I discussed plan of care.  Patient has been febrile overnight.  Lumbar puncture was done yesterday and showed WBC 4, protein 32, glucose 62    11/3:  Patient was seen examined by me this morning.  He is obtunded, response to noxious stimulus however does not follow commands or answer questions.  He has rhythmic movements in his lower extremities which are apparently new.    11/4: Patient was seen and examined by me. He remains to be obtunded. Has been afebrile.    11/6:  Patient was seen examined by me this morning.  He remains to be obtunded however keeps talking nonsensical.  Patient's family is at bedside and I discussed plan of care.         Vitals:  Patient Vitals for the past 24 hrs:   BP Temp Pulse Resp SpO2   11/06/22 0741 (!) 166/70 97.6 °F (36.4 °C) (!) 52 17 100 %   11/06/22 0725 -- -- -- -- 100 %   11/06/22 0436 (!) 174/72 97.3 °F (36.3 °C) (!) 57 18 98 %   11/06/22 0405 -- -- -- -- 98 %   11/05/22 2326 138/83 98.2 °F (36.8 °C) (!) 57 16 98 %   11/05/22 2015 -- -- -- -- 96 %   11/05/22 2001 (!) 146/78 98.6 °F (37 °C) 62 17 (!) 94 %   11/05/22 1500 (!) 157/72 97.1 °F (36.2 °C) 64 18 100 %   11/05/22 1149 -- -- -- -- 100 %   11/05/22 1131 (!) 167/78 97.3 °F (36.3 °C) 62 18 100 %     PHYSICAL EXAM:     GENERAL APPEARANCE:  Patient is obtunded, does not answer to questions or follow commands.    HEENT: Normocephalic and atraumatic. PERRL. Oropharynx unremarkable.  PULM: Comfortable on room air.  CV: RRR.  ABDOMEN: Soft, nontender.  EXTREMITIES: No signs of vascular compromise. Pulses present. No cyanosis, clubbing or edema.  SKIN: Clear; no rashes, lesions  or skin breaks in exposed areas.      NEURO:   MENTAL STATUS: Patient is obtunded, does not answer to questions or follow commands.    CRANIAL NERVES II-XII:  Face is grossly symmetric.  Unable to assess any other cranial nerves due to his mental status  MOTOR: Normal bulk.  Tone is normal in upper and lower extremities bilaterally.    REFLEXES: DTRs 2+; normal and symmetric throughout.   SENSATION: Sensation cannot be tested  COORDINATION:  Cannot be tested  STATION: [place] deferred.  GAIT: Deferred.    CURRENT SCHEDULED MEDICATIONS:   ampicillin-sulbactim (UNASYN) IVPB  3 g Intravenous Q6H    dantrolene (DANTRIUM) IVPB  1 mg/kg Intravenous BID    enoxaparin  40 mg Subcutaneous Daily    fat emulsion 20%  250 mL Intravenous Daily    fat emulsion 20%  250 mL Intravenous Daily    levothyroxine  44 mcg Intravenous Daily    lorazepam  0.5 mg Intravenous QHS    mupirocin   Nasal BID    scopolamine  1 patch Transdermal Q3 Days    senna-docusate 8.6-50 mg  1 tablet Oral BID    sodium chloride 0.9%  10 mL Intravenous Q6H    valproate sodium (DEPACON) IVPB  250 mg Intravenous Q8H     CURRENT INFUSIONS:   Amino acid 5% - dextrose 15% (CLINIMIX-E) solution with additives.  CENTRAL LINE ONLY (1395 mOsm/L) 70 mL/hr at 11/06/22 0650    Amino acid 5% - dextrose 15% (CLINIMIX-E) solution with additives.  CENTRAL LINE ONLY (1395 mOsm/L)      lactated ringers 25 mL/hr at 11/06/22 0650     DATA:  Recent Labs   Lab 10/31/22  0440 11/01/22  0447 11/02/22  0452 11/03/22  0804 11/05/22  1035   * 144 140 137 139   K 3.2* 2.9* 3.7 4.1 3.7   * 112* 110 109 108   CO2 22* 22* 21* 22* 22*   BUN 19 14 13 13 16   CREATININE 0.8 0.9 0.8 0.7 0.6    121* 124* 91 95   CALCIUM 9.1 8.7 8.7 8.8 8.5*   PHOS 2.5* 1.7* 2.9 2.5*  --    MG 2.0 1.8 1.9 1.8  --    AST 89* 56* 40 30  --    ALT 48* 42 38 36  --    AMMONIA  --   --   --   --  23     Recent Labs   Lab 10/31/22  0440 11/01/22  0447 11/02/22  0453 11/03/22  0804 11/05/22  0832    WBC 7.73 9.89 10.40 9.89 7.23   HGB 11.5* 11.3* 11.2* 11.2* 11.2*   HCT 34.4* 33.7* 33.7* 34.3* 31.4*    224 228 197 280     Lab Results   Component Value Date    PROTEINCSF 32 11/01/2022    GLUCCSF 62 11/01/2022     Hemoglobin A1c   Date Value Ref Range Status   05/09/2018 5.6 4.2 - 6.3 % Final            I have personally reviewed and interpreted the pertinent imaging and lab results.  Imaging Results               CT Abdomen Pelvis  Without Contrast (Final result)  Result time 10/29/22 16:26:14      Final result by Luana Horner MD (10/29/22 16:26:14)                   Impression:      Study limited by motion, demonstrating a complicated cyst or solid mass extending from the lower pole of the left kidney.  This should be worked up further with a study with IV contrast when the patient is able to remain still.    Nonobstructing right renal calculus.    Constipation.    This report was flagged in Epic as abnormal.      Electronically signed by: Luana Horner  Date:    10/29/2022  Time:    16:26               Narrative:    EXAMINATION:  CT ABDOMEN PELVIS WITHOUT CONTRAST    CLINICAL HISTORY:  Abdominal abscess/infection suspected; failure to thrive    TECHNIQUE:  Low dose axial images, sagittal and coronal reformations were obtained from the lung bases to the pubic symphysis.  Neither oral nor IV contrast was administered.  The patient had a difficult time remaining still for image acquisition    COMPARISON:  None    FINDINGS:  Consolidation in the dependent portions of the lung bases is likely due to atelectasis.    Limited noncontrast images of the liver, gallbladder, spleen, adrenal glands, pancreas are grossly normal.  There is a nonobstructing 5 mm right lower pole renal calculus.  There is a 3.2 x 3.5 cm structure with a density of 46 Hounsfield units extending from the lower pole of the left kidney series 4, image 99.  This may be a complex cyst or solid mass.  No adenopathy is  noted.    The aorta is normal in caliber.    There is generalized fecal stasis throughout the colon.  No evidence of a bowel obstruction.  Urinary bladder unremarkable.  There is no free intraperitoneal fluid or air.  No acute findings in the bones.                                       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  This is likely an incidental finding.  It produces no mass effect/edema on the adjacent brain.  Otherwise, essentially negative study, aside from atrophy.      Electronically signed by: Luana Horner  Date:    10/29/2022  Time:    12:01               Narrative:    EXAMINATION:  CT HEAD WITHOUT CONTRAST    CLINICAL HISTORY:  Mental status change, unknown cause;    TECHNIQUE:  Low dose axial images were obtained through the head.  Coronal and sagittal reformations were also performed. Contrast was not administered.    COMPARISON:  None    FINDINGS:  The study is mildly limited by motion.  There is no intra or extra-axial hemorrhage.  No mass effect, edema or midline shift is present.  Mild generalized atrophy, particularly central atrophy is noted.  There is no skull fracture.  The visualized paranasal sinuses are clear.    There is 1.6 cm calcified mass in the floor of the right anterior cranial fossa, extending from the petrous temporal bone.  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  There is no edema in the adjacent temporal lobe.                                       X-Ray Chest AP Portable (Final result)  Result time 10/29/22 11:35:49      Final result by Luana Horner MD (10/29/22 11:35:49)                   Impression:      No acute cardiopulmonary disease.      Electronically signed by: Luana Chávez  Evens  Date:    10/29/2022  Time:    11:35               Narrative:    EXAMINATION:  XR CHEST AP PORTABLE    CLINICAL HISTORY:  Altered mental status, unspecified    TECHNIQUE:  Single frontal view of the chest was performed.    COMPARISON:  05/28/2018    FINDINGS:  The patient is mildly rotated to the right.  The heart size is normal.  The aorta is ectatic.  The lungs are clear.  There is no pleural effusion or acute bony abnormality.                                              ASSESSMENT AND PLAN:    Encephalopathy    Etiology of encephalopathy could be multifactorial.  Patient has been febrile and there is concern for infectious encephalopathy versus worsening dementia versus polypharmacy. Also in the differential is NMS vs Serotonin syndrome(less likely with no significant hyperreflexia) as CK has been elevated on admissionand been febrile.  Repeat EEG:  Moderate encephalopathy without seizures or epileptiform activity.  Etiology of his lower extremity movements likely dyskinesias and less likely to be seizures.  Continue IV fluids and scheduled ativan  Patient is on Trileptal at home which is likely for mood.  Patient does not have any history of seizures as per his wife.  Recommend c/w valproic acid 250 mg t.i.d. for mood as he cannot take PO  CSF results WBC 4, protein 32, glucose 62.  Concern for intracranial infection is low.    Continue Dantrolene(slow taper off in the next few days) for possible NMS. Will stop ativan  Continue IV fluids  Hold Zyprexa and Fluoxetine. Appreciate psych recs  Palliative following  ID on board. Concern for aspiration PNA and being treated with antibiotics.  Currently on Unasyn  DVT prophylaxis  Will follow as needed. Please call with questions          32 minutes of care time has been spent evaluating with the patient. Time includes chart review not limited to diagnostic imaging, labs, and vitals, patient assessment, discussion with family and nursing, current order  evaluations, and new order entries.      Lobito Calle MD  Neurology/vascular Neurology  Date of Service: 11/06/2022  9:36 AM    Please note: This note was transcribed using voice recognition software. Because of this technology there are often uinintended grammatical, spelling, and other transcription errors. Please disregard these errors.

## 2022-11-06 NOTE — PLAN OF CARE
"Plan of care review with pt, pt states "understanding," IVF/TPN infusing without difficulty, no redness/swelling noted to picc site, blood return present to monae lumens, remains afebrile while receiving antibiotic therapy, pt is combative, pulling at tubing and fighting staff, soft wrist restraints continues, family at bedside, will continue to monitor, observe and note any changes, safety maintain    "

## 2022-11-07 LAB
ANION GAP SERPL CALC-SCNC: 7 MMOL/L (ref 8–16)
BACTERIA CSF CULT: NO GROWTH
BASOPHILS # BLD AUTO: 0.06 K/UL (ref 0–0.2)
BASOPHILS NFR BLD: 0.6 % (ref 0–1.9)
BUN SERPL-MCNC: 16 MG/DL (ref 8–23)
CALCIUM SERPL-MCNC: 8.5 MG/DL (ref 8.7–10.5)
CHLORIDE SERPL-SCNC: 110 MMOL/L (ref 95–110)
CO2 SERPL-SCNC: 23 MMOL/L (ref 23–29)
CREAT SERPL-MCNC: 0.7 MG/DL (ref 0.5–1.4)
DIFFERENTIAL METHOD: ABNORMAL
EOSINOPHIL # BLD AUTO: 0.4 K/UL (ref 0–0.5)
EOSINOPHIL NFR BLD: 3.3 % (ref 0–8)
ERYTHROCYTE [DISTWIDTH] IN BLOOD BY AUTOMATED COUNT: 12.5 % (ref 11.5–14.5)
EST. GFR  (NO RACE VARIABLE): >60 ML/MIN/1.73 M^2
GLUCOSE SERPL-MCNC: 120 MG/DL (ref 70–110)
GRAM STN SPEC: NORMAL
GRAM STN SPEC: NORMAL
HCT VFR BLD AUTO: 33.7 % (ref 40–54)
HGB BLD-MCNC: 11.3 G/DL (ref 14–18)
IMM GRANULOCYTES # BLD AUTO: 0.5 K/UL (ref 0–0.04)
IMM GRANULOCYTES NFR BLD AUTO: 4.7 % (ref 0–0.5)
LYMPHOCYTES # BLD AUTO: 0.9 K/UL (ref 1–4.8)
LYMPHOCYTES NFR BLD: 8.6 % (ref 18–48)
MCH RBC QN AUTO: 30.5 PG (ref 27–31)
MCHC RBC AUTO-ENTMCNC: 33.5 G/DL (ref 32–36)
MCV RBC AUTO: 91 FL (ref 82–98)
MONOCYTES # BLD AUTO: 1.4 K/UL (ref 0.3–1)
MONOCYTES NFR BLD: 12.7 % (ref 4–15)
NEUTROPHILS # BLD AUTO: 7.5 K/UL (ref 1.8–7.7)
NEUTROPHILS NFR BLD: 70.1 % (ref 38–73)
NRBC BLD-RTO: 0 /100 WBC
PLATELET # BLD AUTO: 412 K/UL (ref 150–450)
PLATELET BLD QL SMEAR: ABNORMAL
PMV BLD AUTO: 10.9 FL (ref 9.2–12.9)
POTASSIUM SERPL-SCNC: 3.4 MMOL/L (ref 3.5–5.1)
RBC # BLD AUTO: 3.71 M/UL (ref 4.6–6.2)
SODIUM SERPL-SCNC: 140 MMOL/L (ref 136–145)
WBC # BLD AUTO: 10.73 K/UL (ref 3.9–12.7)

## 2022-11-07 PROCEDURE — 99233 PR SUBSEQUENT HOSPITAL CARE,LEVL III: ICD-10-PCS | Mod: S$GLB,,, | Performed by: INTERNAL MEDICINE

## 2022-11-07 PROCEDURE — 99233 SBSQ HOSP IP/OBS HIGH 50: CPT | Mod: S$GLB,,, | Performed by: INTERNAL MEDICINE

## 2022-11-07 PROCEDURE — 25000003 PHARM REV CODE 250: Performed by: INTERNAL MEDICINE

## 2022-11-07 PROCEDURE — 25000003 PHARM REV CODE 250: Performed by: STUDENT IN AN ORGANIZED HEALTH CARE EDUCATION/TRAINING PROGRAM

## 2022-11-07 PROCEDURE — 12000002 HC ACUTE/MED SURGE SEMI-PRIVATE ROOM

## 2022-11-07 PROCEDURE — B4185 PARENTERAL SOL 10 GM LIPIDS: HCPCS | Performed by: STUDENT IN AN ORGANIZED HEALTH CARE EDUCATION/TRAINING PROGRAM

## 2022-11-07 PROCEDURE — 85025 COMPLETE CBC W/AUTO DIFF WBC: CPT | Performed by: STUDENT IN AN ORGANIZED HEALTH CARE EDUCATION/TRAINING PROGRAM

## 2022-11-07 PROCEDURE — A4216 STERILE WATER/SALINE, 10 ML: HCPCS | Performed by: HOSPITALIST

## 2022-11-07 PROCEDURE — 36415 COLL VENOUS BLD VENIPUNCTURE: CPT | Performed by: STUDENT IN AN ORGANIZED HEALTH CARE EDUCATION/TRAINING PROGRAM

## 2022-11-07 PROCEDURE — 94761 N-INVAS EAR/PLS OXIMETRY MLT: CPT

## 2022-11-07 PROCEDURE — 25000003 PHARM REV CODE 250: Performed by: HOSPITALIST

## 2022-11-07 PROCEDURE — 63600175 PHARM REV CODE 636 W HCPCS: Performed by: STUDENT IN AN ORGANIZED HEALTH CARE EDUCATION/TRAINING PROGRAM

## 2022-11-07 PROCEDURE — 80048 BASIC METABOLIC PNL TOTAL CA: CPT | Performed by: STUDENT IN AN ORGANIZED HEALTH CARE EDUCATION/TRAINING PROGRAM

## 2022-11-07 PROCEDURE — 63600175 PHARM REV CODE 636 W HCPCS: Performed by: HOSPITALIST

## 2022-11-07 PROCEDURE — A4217 STERILE WATER/SALINE, 500 ML: HCPCS | Performed by: INTERNAL MEDICINE

## 2022-11-07 RX ORDER — LORAZEPAM 2 MG/ML
0.5 INJECTION INTRAMUSCULAR ONCE
Status: COMPLETED | OUTPATIENT
Start: 2022-11-07 | End: 2022-11-07

## 2022-11-07 RX ADMIN — LEVOTHYROXINE SODIUM 44 MCG: 20 INJECTION, SOLUTION INTRAVENOUS at 10:11

## 2022-11-07 RX ADMIN — LORAZEPAM 0.5 MG: 2 INJECTION INTRAMUSCULAR; INTRAVENOUS at 04:11

## 2022-11-07 RX ADMIN — DANTROLENE SODIUM 55 MG: 20 INJECTION, POWDER, LYOPHILIZED, FOR SOLUTION INTRAVENOUS at 05:11

## 2022-11-07 RX ADMIN — DEXTROSE 250 MG: 50 INJECTION, SOLUTION INTRAVENOUS at 10:11

## 2022-11-07 RX ADMIN — SODIUM CHLORIDE, PRESERVATIVE FREE 10 ML: 5 INJECTION INTRAVENOUS at 05:11

## 2022-11-07 RX ADMIN — MUPIROCIN: 20 OINTMENT TOPICAL at 10:11

## 2022-11-07 RX ADMIN — AMPICILLIN SODIUM AND SULBACTAM SODIUM 3 G: 2; 1 INJECTION, POWDER, FOR SOLUTION INTRAMUSCULAR; INTRAVENOUS at 04:11

## 2022-11-07 RX ADMIN — AMPICILLIN SODIUM AND SULBACTAM SODIUM 3 G: 2; 1 INJECTION, POWDER, FOR SOLUTION INTRAMUSCULAR; INTRAVENOUS at 03:11

## 2022-11-07 RX ADMIN — DEXTROSE 250 MG: 50 INJECTION, SOLUTION INTRAVENOUS at 07:11

## 2022-11-07 RX ADMIN — SODIUM CHLORIDE, PRESERVATIVE FREE 10 ML: 5 INJECTION INTRAVENOUS at 06:11

## 2022-11-07 RX ADMIN — I.V. FAT EMULSION 250 ML: 20 EMULSION INTRAVENOUS at 10:11

## 2022-11-07 RX ADMIN — ASCORBIC ACID, VITAMIN A PALMITATE, CHOLECALCIFEROL, THIAMINE HYDROCHLORIDE, RIBOFLAVIN-5 PHOSPHATE SODIUM, PYRIDOXINE HYDROCHLORIDE, NIACINAMIDE, DEXPANTHENOL, ALPHA-TOCOPHEROL ACETATE, VITAMIN K1, FOLIC ACID, BIOTIN, CYANOCOBALAMIN: 200; 3300; 200; 6; 3.6; 6; 40; 15; 10; 150; 600; 60; 5 INJECTION, SOLUTION INTRAVENOUS at 10:11

## 2022-11-07 RX ADMIN — AMPICILLIN SODIUM AND SULBACTAM SODIUM 3 G: 2; 1 INJECTION, POWDER, FOR SOLUTION INTRAMUSCULAR; INTRAVENOUS at 10:11

## 2022-11-07 RX ADMIN — SODIUM CHLORIDE, PRESERVATIVE FREE 10 ML: 5 INJECTION INTRAVENOUS at 12:11

## 2022-11-07 RX ADMIN — ENOXAPARIN SODIUM 40 MG: 100 INJECTION SUBCUTANEOUS at 05:11

## 2022-11-07 RX ADMIN — DEXTROSE 250 MG: 50 INJECTION, SOLUTION INTRAVENOUS at 12:11

## 2022-11-07 RX ADMIN — AMPICILLIN SODIUM AND SULBACTAM SODIUM 3 G: 2; 1 INJECTION, POWDER, FOR SOLUTION INTRAMUSCULAR; INTRAVENOUS at 09:11

## 2022-11-07 RX ADMIN — MUPIROCIN: 20 OINTMENT TOPICAL at 09:11

## 2022-11-07 NOTE — SUBJECTIVE & OBJECTIVE
Interval History: Patient seen and examined. ACEON. No significant changes to mental status noted. Family at bedside. Palliative to meet with them.    Review of Systems   Unable to perform ROS: Mental status change   Objective:     Vital Signs (Most Recent):  Temp: 97.9 °F (36.6 °C) (11/07/22 0731)  Pulse: 64 (11/07/22 0750)  Resp: (!) 22 (11/07/22 0750)  BP: (!) 163/73 (11/07/22 0731)  SpO2: (!) 94 % (11/07/22 0750)   Vital Signs (24h Range):  Temp:  [96.2 °F (35.7 °C)-98.7 °F (37.1 °C)] 97.9 °F (36.6 °C)  Pulse:  [51-65] 64  Resp:  [16-22] 22  SpO2:  [94 %-100 %] 94 %  BP: (133-180)/(63-80) 163/73     Weight: 56.7 kg (125 lb)  Body mass index is 19.58 kg/m².    Intake/Output Summary (Last 24 hours) at 11/7/2022 1036  Last data filed at 11/7/2022 1027  Gross per 24 hour   Intake 1572 ml   Output 2500 ml   Net -928 ml      Physical Exam  Constitutional:       General: He is not in acute distress.     Appearance: He is ill-appearing.      Comments: In restraints   HENT:      Head: Normocephalic and atraumatic.   Cardiovascular:      Rate and Rhythm: Normal rate and regular rhythm.   Pulmonary:      Effort: Pulmonary effort is normal. No respiratory distress.      Breath sounds: No wheezing.   Abdominal:      General: Abdomen is flat. Bowel sounds are normal. There is no distension.      Palpations: Abdomen is soft.      Tenderness: There is no abdominal tenderness. There is no guarding or rebound.   Musculoskeletal:         General: No swelling or tenderness.      Cervical back: Neck supple. No tenderness.   Skin:     General: Skin is warm and dry.   Neurological:      GCS: GCS eye subscore is 1. GCS verbal subscore is 2. GCS motor subscore is 5.      Comments: Obtunded  Not able to cooperate  Making incomprehensible noises  Overall increased tone in musculature       Significant Labs: All pertinent labs within the past 24 hours have been reviewed.    Significant Imaging: I have reviewed all pertinent imaging  results/findings within the past 24 hours.

## 2022-11-07 NOTE — PROGRESS NOTES
[facility] Medicine  Progress Note    Patient Name: Ronn Caballero Jr.  MRN: [id number]  Patient Class: IP- Inpatient   Admission Date: 10/29/2022  Length of Stay: 9 days  Attending Physician: Jefferson Mehta MD  Primary Care Provider: JELLY Decker        Subjective:     Principal Problem:Dementia with behavioral disturbance        HPI:  73 yo male with h/o thyroid cancer, frontal temporal lobe dementia, HTN, HLP, depression and anxiety transferred from [facility] due to 6 days of failure to thrive. It is reported from the facility that he was PEC/CEC (expires 11/3/22) due to combative behavior at home. History comes from the patient's spouse and sister-in -law at bedside. Patient follows a neurologist in [place] and spouse reports recent change in medications include increase of zyprexa from 2.5mg to 10mg BID. He was also started on gabapentin TID. Spouse denies recent fever, chills, cough, D/N/V.  Per the MAR from [place] he received Haldol x4 IM, benadryl and ativan IV x2. The facility reports he was not eating or drinking and no witnessed episodes of diarrhea or vomiting. On our workup he has Na153, elevated  AST, hematuria, WBC 17k with 2% bands, lactic acid 1.2. No source of infection on UA or chest xray. On exam he does show discomfort in RUQ and midepigastrium. Abd CT and lipase pending. Troponin mildly elevated 0.056 with some EKG changes.     Hospital medicine consulted for further medical management. Physical restraints will be used to protect patient as he is pulling at cardiac leads and peripheral IV. Neurology consulted. Needs tele sitter.       Overview/Hospital Course:  Patient admitted from [facility] facility with failure to thrive and progressive psychosis. He has h/o frontal temporal dementia. Overnight he was given IV ativan and has no improvement in behavior. Patient unable to participate in a telepsych visit.  Neurology was consulted on guidance to medications. Appears he was given a combination of meds in addition to his home medication and possibly polypharmacy resulted in psychosis. He does require restraints as he is a harm to himself. Will try to resume home medications if he can follow commands to swallow pills. Electrolyte replacement by IV. IVF for hypernatremia and elevated CPK >3000. Monitoring on tele due to QT prolongation. The spouse and daughter at bedside and agree to palliative care discussion in regards to goals of care and advanced care planning.     Fevers persistent - started on amp, rocephin, vanc and acyclovir for concern of CNS infection. LP completed and studies pending. Brain MRI: poor study due to motion artifact, generalized volume loss. Zyprexa dc'd, concern for NMS. Ativan IV Ordered. Restraints remain in place. Picc line placed to RUE. TPN ordered for nutrition. Unable to take oral meds and diet due to mental status. CK trending down 1700. Na 144.     Meningitis antibiotics dc'd, ativan weaning, TPN infusing, CXR did not show infiltrates, continues to spike fevers.  Psychiatry consult placed, though not able to do if patient cannot communicate on camera. Resp culture pending, unasyn IV should cover. Continuing dantrolene. With drop in O2 sats on RA, place on supplemental O2. CK trending down. LA normal.     PEC/CEC dc'd/. Fevers resolved. Resp culture + staph - MSSA. ID, neurology and psychiatry following. Depakote IV for mood stabilization. Prefer to not try anti-psychotics and SSRi in setting of possible NMS. Palliative care had meeting with patient/family made DNR.     Continues obtunded not following commands. TPN and restraints continued. To continue with supportive care and re-evaluate goals of care. Dantrolene weaning. Ativan dc'd. Palliative to re-visit.      Interval History: Patient seen and examined. NAEON. No significant changes to mental status noted. Family at bedside.  Palliative to meet with them.    Review of Systems   Unable to perform ROS: Mental status change   Objective:     Vital Signs (Most Recent):  Temp: 97.9 °F (36.6 °C) (11/07/22 0731)  Pulse: 64 (11/07/22 0750)  Resp: (!) 22 (11/07/22 0750)  BP: (!) 163/73 (11/07/22 0731)  SpO2: (!) 94 % (11/07/22 0750)   Vital Signs (24h Range):  Temp:  [96.2 °F (35.7 °C)-98.7 °F (37.1 °C)] 97.9 °F (36.6 °C)  Pulse:  [51-65] 64  Resp:  [16-22] 22  SpO2:  [94 %-100 %] 94 %  BP: (133-180)/(63-80) 163/73     Weight: 56.7 kg (125 lb)  Body mass index is 19.58 kg/m².    Intake/Output Summary (Last 24 hours) at 11/7/2022 1036  Last data filed at 11/7/2022 1027  Gross per 24 hour   Intake 1572 ml   Output 2500 ml   Net -928 ml      Physical Exam  Constitutional:       General: He is not in acute distress.     Appearance: He is ill-appearing.      Comments: In restraints   HENT:      Head: Normocephalic and atraumatic.   Cardiovascular:      Rate and Rhythm: Normal rate and regular rhythm.   Pulmonary:      Effort: Pulmonary effort is normal. No respiratory distress.      Breath sounds: No wheezing.   Abdominal:      General: Abdomen is flat. Bowel sounds are normal. There is no distension.      Palpations: Abdomen is soft.      Tenderness: There is no abdominal tenderness. There is no guarding or rebound.   Musculoskeletal:         General: No swelling or tenderness.      Cervical back: Neck supple. No tenderness.   Skin:     General: Skin is warm and dry.   Neurological:      GCS: GCS eye subscore is 1. GCS verbal subscore is 2. GCS motor subscore is 5.      Comments: Obtunded  Not able to cooperate  Making incomprehensible noises  Overall increased tone in musculature       Significant Labs: All pertinent labs within the past 24 hours have been reviewed.    Significant Imaging: I have reviewed all pertinent imaging results/findings within the past 24 hours.      Assessment/Plan:      * Dementia with behavioral disturbance  Sharp change in  status on admit from baseline. At this point mostly suspecting NMS on progressive FTD  Little to no improvement noted  CT of abdomen and pelvis - left kidney mass vs complex cyst, non obstructing stone   Head CT and brain MRi reviewed  IVF decreased 2/2 large volume intake overall  Neurology consulted and following  Psychiatry consult placed - patient cannot participate in current state - possible medication recommendation for FTD as to cause NMS in future   No haldol given here  Nightly ativan decreased and discontinued now (was taking home xanax)  Restraints continued due to danger to self  Tele monitoring   See fever section for CNS infx rule out  See NMS section as well    Frontotemporal dementia  Follows neurologist in [place] with recent increases in meds PTA - zyprexa 2.5mg to 10mg BID + Prozac and trileptal   Trileptal level <1   See above    Fever  CXR:  PICC placed in good position.  1.5 cm density projecting over the right mid lung feel likely represents overlapping shadows but follow-up chest x-rays are recommended to assure resolution and absence of a pulmonary lesion  CTA head and neck :  4. Right upper lobe ground-glass opacities in patchy consolidation, nonspecific and may reflect sequelae of small airways inflammation or infection.   UA reflex to culture - NTD  Blood culture- NTD  CSF - negative      Ampicillin, acyclovir, rocephin and vancomycin - dc'd  Resp did deep suction - MSSA covered with Unasyn, vanc dc'd now again  Concern for NMS - see NMS section    NMS (neuroleptic malignant syndrome)  Working diagnosis in the setting of fever, muscle rigidity, AMS and exposure to multiple anti-psychotics   Infection workup in process - MSSA pna covered w/ abx but otherwise not yielding etiology  CSF findings overall unremarkable  Dantrolene IV q6 > q8 > q12 > q24 - dosing per neurology  Cannot take bromocriptine due to NPO/mental status     Non-traumatic rhabdomyolysis  resolved    Moderate  malnutrition  Nutrition consulted. Most recent weight and BMI monitored-      Malnutrition (Moderate to Severe)  Energy Intake (Malnutrition): less than 75% for greater than or equal to 1 month    Final Summary  Subcutaneous Fat Loss (Final Summary): mild protein-calorie malnutrition  Muscle Loss Evaluation (Final Summary): mild protein-calorie malnutrition         Measurements:  Wt Readings from Last 1 Encounters:   11/01/22 56.7 kg (125 lb)   Body mass index is 19.58 kg/m².    Recommendations: Recommendation/Intervention: 1) Start TPN via PICC when placed: D15 AA 5 @ 70 ml/hr + standard lipids ( provides 84 g protein ( 100% EPN), 1643 kcal ( 100% EEN)) 2) If pt remains NPO and NGT placement become possible or PEG needed longterm rec TF: Peptamen 1.5 Prebio @ 20 ml/hr advancing to goal of 45 ml/hr + 130 ml flush q 4 hr  ( provides 1620 kcal ( 100% EEN), 73 g protein ( 100% EPN),and 831 ml free water) 3) If AMS improves and able to advance diet rec. regular, texure per SLP to encourage PO intakes 4) weigh weekly  Goals: 1) Nutrition support meeting > 75% of needs at f/u    Patient has been screened and assessed by RD. RD will follow patient.    TPN until patient is alert for oral diet vs peg tube placement     Goals of care, counseling/discussion  Spouse decided on DNR   Appreciate palliative care meeting with family - to meet again today and/or tomorrow    Bandemia  See fever section    Postoperative hypothyroidism  Resume synthroid - IV due to NPo status   FT4 - normal     Hypernatremia  Resolved with IVF    Failure to thrive in adult  Not taking PO  TPN for nutrition   Progression of dementia  Palliative care discussion - DNR  If he does not recover neurologically, spouse will need to make decision regarding feeding tube vs hospice    essential hypertension  Chronic, controlled  Not taking po  IV labetalol PRN       VTE Risk Mitigation (From admission, onward)         Ordered     enoxaparin injection 40 mg  Daily          10/29/22 1513     IP VTE HIGH RISK PATIENT  Once         10/29/22 1513     Place sequential compression device  Until discontinued         10/29/22 1513                Discharge Planning   TAYLOR: 11/7/2022 - unclear    Code Status: DNR   Is the patient medically ready for discharge?:     Reason for patient still in hospital (select all that apply): Patient trending condition, Consult recommendations and Pending disposition  Discharge Plan A: [facility]        Jefferson Mehta MD  Department of Hospital Medicine   [facility]

## 2022-11-07 NOTE — PLAN OF CARE
POC reviewed w wife and family, verbalized understanding.  Resp even et unlabored. Alert and oriented x 1 .  Soft Restraints on upper wrist. Pt agitated, restless through out the day. Family at bedside. Decided to go home w hospice per wife. Condom cath in pl;ace. TPN @ 70. Bed lowered, locked and belongings in reach. Safety maintained.

## 2022-11-07 NOTE — ASSESSMENT & PLAN NOTE
Spouse decided on DNR   Appreciate palliative care meeting with family - to meet again today and/or tomorrow

## 2022-11-07 NOTE — PROGRESS NOTES
Progress Note  Infectious Disease    Reason for Consult:  AMS + Fever rule out meningitis     HPI: Ronn Caballero Jr. is a 72 y.o. male with past medical history of thyroid cancer, frontal temporal dementia, hypertension, hyperlipidemia, depression/anxiety who was transferred from [facility] due to 6 days of failure to thrive.  History obtain from wife at bedside.  She states patient started acting aggressive 2 weeks ago, she had to call the ambulance and patient was placed at [place].  Of note patient was taking Zyprexa 2.5 mg daily and dose was increased 10 mg twice a day.  He was also started on gabapentin 3 times a day.  Wife states she was not able to communicate with the facility for about a week until she called her daughter for assistance and was advised to bring the patient to the emergency room.  As per records, patient received Haldol x4, Benadryl, and Ativan x2.  Reports of not eating, not drinking noted.  No personal history of seizures.  She states patient has shuffled gait at baseline, last time she saw him at home he did not have cough, shortness of breath, nausea or vomiting, or any complaint before being transferred to psychiatric facility.    In the ER, patient hypertensive, afebrile   Labs on admission, leukocytosis 17.4, left shift 85%, bands 2%, H&H 13.7/41.9, platelet count 274   Hypernatremia 153  Creatinine 1.2   /ALT 42   CPK 3221   Lactic acid 1.2   Procalcitonin 0.3  UA with no pyuria, occasional bacteria, negative for UTI   Blood cultures x2 no growth to date, pending final   Chest x-ray clear   CT head with calcified mass in the floor of the right middle cranial fossa, most suggestive of petrous apex meningioma.  Incidental finding.  Essential negative study, aside from atrophy.    CT abdomen/pelvis limited by motion, cyst on left kidney.  Constipation.  Nonobstructive renal stone.    Hospital course complicated by fever of  101 10/30 and 102.5 today.   "ID consult to rule out meningitis. Empirically started on Vancomycin and Ceftriaxone IV.     INTERVAL HISTORY:  11/1: Interim reviewed, patient seen and examined at beside, remains somnolent, not following commands, gargling. Hypertensive, Tmax 102.3 overnight, currently afebrile. Discussed with neurology, will try empiric dantrolene, ordered MRI brain and attempt LP. Labs reviewed, WBC 9.8, left shift 75.4%, creatinie stable. Phsophorus 1.7, AST 56 trending down, ALT normal. Micro reviewed, no growth so far.     11/2:  Interim reviewed, patient seen examined at bedside.  Remains somnolent, not following commands.  Hypertensive, T-max 101.4° yesterday, currently afebrile.  Had LP performed yesterday, WBC 4, normal glucose and protein, negative for meningitis.  Discussed with respiratory therapy, copious amount of purulent secretions suctioned, specimen sent for culture.  Labs reviewed, white count 10.4, left shift 76%, H&H 11.2/33.7, platelet count 228.  Stable kidney function, albumin 2.4, normal LFTs.  CPK trending down 06/03/2005, lactic acid 1, procalcitonin 0.7, suspecting ongoing aspiration pneumonia.    11/3: Interim reviewed, patient seen and examined at bedside, wife and son present. Patient remains with his eyes closed, responding to verbal commands, "what, yes'. Hemodynamically stable, afebrile in the last 36h, tolerating Dantrolene.  Adequate urine output, Fernandes remains in place, passing gas, no bowel movements yet.  Labs reviewed, stable white count, no left shift, H&H 11.2/34.3, platelet count 197.  Chest x-ray clear.     11/4:  Interim reviewed, patient seen examined at bedside, remains obtunded, with spontaneous jerky movements, wife at bedside.  Patient has been afebrile since 11/2, labs reviewed stable.  Procalcitonin now normal.  Micro reviewed respiratory culture grew Staph aureus, sensitivities pending.  11/05/22 confused,sleeps most of the time. afebrile  11/06/2022  confused. Family at " bedside. Wife understands he has a poor prognosis, but at the same time she is in denial, and hopefull he would get better  11/07/2022.  Confused.  No interaction.  Sleeping.  Does not allow me to properly examine his eyes.  Afebrile.  WBC 10.    I discussed extensively yesterday with family.  Today wife is not available  Antibiotics (From admission, onward)      Start     Stop Route Frequency Ordered    11/05/22 0930  mupirocin 2 % ointment         11/10 0859 Nasl 2 times daily 11/05/22 0827 11/02/22 1230  ampicillin-sulbactam 3 g in sodium chloride 0.9 % 100 mL IVPB (ready to mix system)         -- IV Every 6 hours (non-standard times) 11/02/22 0951          Antifungals (From admission, onward)      None          Antivirals (From admission, onward)      None            Review of patient's allergies indicates:  No Known Allergies  Past Medical History:   Diagnosis Date    Cancer     Dementia     Depression     Hypertension      Past Surgical History:   Procedure Laterality Date    Lymph node Ca removed       Social History     Tobacco Use    Smoking status: Not on file    Smokeless tobacco: Not on file   Substance Use Topics    Alcohol use: Not on file        No family history on file.    Pertinent medications noted: Zyprexa/haldol/Ativan    Review of Systems: Patient somnolent, unable to follow commands.     Outdoor activities: From home, recently admitted to [place] for abnormal behavior in the setting of frontotemporal dementia.  Travel: None  Implants: None  Antibiotic History: Vanco/Ceftriaxone    EXAM & DIAGNOSTICS REVIEWED:   Vitals:     Temp:  [96.2 °F (35.7 °C)-98.7 °F (37.1 °C)]   Temp: 98.2 °F (36.8 °C) (11/07/22 0339)  Pulse: (!) 52 (11/07/22 0339)  Resp: 16 (11/07/22 0339)  BP: (!) 146/67 (11/07/22 0339)  SpO2: 96 % (11/07/22 0339)    Intake/Output Summary (Last 24 hours) at 11/7/2022 0722  Last data filed at 11/7/2022 0553  Gross per 24 hour   Intake 1572 ml   Output 2075 ml   Net -503 ml        General:  Frail, chronically-ill appearing, obtunded with spontaneous jerks, O2 by Nc 2L  Eyes:  Eyes shut, unable to open  ENT:  He is a mouth breather; his tongue gets dry, he does not allow proper mouth care.  Neck:  Supple   Lungs: Coarse breath sounds b/l, limited at bases, does not breathe all the way in.  Heart:  S1/S2+, regular rhythm, no murmurs  Abd:  +BS, soft, non tender, non distended, no rebound  :  Voids  Musc:  Generalized muscle wasting, joints without effusion, swelling,  erythema, synovitis  Skin:  Warm,   Wound:   Neuro:  Mumbling, not following commands, sleeps most of the time  Psych:    Lymphatic:       Extrem: No LE edema b/l  VAD:  PICC line L 11/1      Isolation: None    11/2:          11/1:        10/31:        General Labs reviewed:  Recent Labs   Lab 11/02/22  0453 11/03/22  0804 11/05/22  0832   WBC 10.40 9.89 7.23   HGB 11.2* 11.2* 11.2*   HCT 33.7* 34.3* 31.4*    197 280       Recent Labs   Lab 11/01/22  0447 11/02/22  0452 11/03/22  0804 11/05/22  1035    140 137 139   K 2.9* 3.7 4.1 3.7   * 110 109 108   CO2 22* 21* 22* 22*   BUN 14 13 13 16   CREATININE 0.9 0.8 0.7 0.6   CALCIUM 8.7 8.7 8.8 8.5*   PROT 5.6* 5.6* 5.4*  --    BILITOT 0.7 0.4 0.7  --    ALKPHOS 79 83 71  --    ALT 42 38 36  --    AST 56* 40 30  --      No results for input(s): CRP in the last 168 hours.  No results for input(s): SEDRATE in the last 168 hours.    Estimated Creatinine Clearance: 91.9 mL/min (based on SCr of 0.6 mg/dL).     Micro:  Microbiology Results (last 7 days)       Procedure Component Value Units Date/Time    CSF culture [[id number]] Collected: 11/01/22 6973    Order Status: Completed Specimen: CSF (Spinal Fluid) from CSF Tap, Tube 2 Updated: 11/07/22 0718     CSF CULTURE No Growth     Gram Stain Result No WBC's, epithelial cells or organisms seen      11/01/2022  17:11 TN3    Culture, Respiratory with Gram Stain [[id number]]  (Abnormal)  (Susceptibility) Collected:  11/01/22 1744    Order Status: Completed Specimen: Respiratory from Tracheal Aspirate Updated: 11/05/22 1355     Respiratory Culture STAPHYLOCOCCUS AUREUS  Many  Normal respiratory annetta also present       Gram Stain (Respiratory) Many WBC's     Gram Stain (Respiratory) Few Gram positive cocci     Gram Stain (Respiratory) Few Gram negative rods     Gram Stain (Respiratory) Rare Gram positive rods     Gram Stain (Respiratory) Rare budding yeast    Aerobic culture [[id number]] Collected: 11/02/22 1200    Order Status: Completed Specimen: Wound from Arm, Right Updated: 11/05/22 1229     Aerobic Bacterial Culture No significant isolate    Narrative:      Antecubital space    Blood culture (site 2) [[id number]] Collected: 10/29/22 1543    Order Status: Completed Specimen: Blood Updated: 11/04/22 0612     Blood Culture, Routine No growth after 5 days.    Narrative:      Site # 2, aerobic only    Blood culture (site 1) [[id number]] Collected: 10/29/22 1542    Order Status: Completed Specimen: Blood Updated: 11/04/22 0612     Blood Culture, Routine No growth after 5 days.    Narrative:      Site # 1, aerobic and anaerobic    Gram stain [[id number]] Collected: 11/01/22 1513    Order Status: Canceled Specimen: CSF (Spinal Fluid) from CSF Tap, Tube 2           Staphylococcus aureus      CULTURE, RESPIRATORY   Clindamycin <=0.5 mcg/mL Sensitive   Erythromycin >4 mcg/mL Resistant   Oxacillin <=0.25 mcg/mL Sensitive   Penicillin 2 mcg/mL Resistant   Tetracycline <=4 mcg/mL Sensitive   Trimeth/Sulfa <=0.5/9.5 m... Sensitive       Imaging Reviewed:  CXR  CT head   CT head/neck  CT abdomen/pelvis     MRI Brain:  1. Moderate to marked patient motion slightly limits evaluation but there is no acute infarction.  There is no acute intracranial hemorrhage or parenchymal mass.  There is generalized volume loss.  There is also mild nonspecific white matter change.   2. There is calcification in the posterolateral aspect of the right middle  cranial fossa which is better demonstrated on comparison CT.  There is no definite associated enhancement as would be expected in a meningioma.  This could potentially represent very prominent incidental dural calcification or possible osteoma.  This is not acute.     Cardiology: EKG Qtc 496ms       IMPRESSION & PLAN     AMS + Fever, meningitis ruled out - NMS vs Serotoninergic syndrome in the setting of recent increased dose of Zyprexa and other neuroleptics  Blood cultures x 2 no growth   LP negative for meningitis   Respiratory culture  MSSA, possible pneumonia?  Wound cultures 11/1 no growth    2. Aspiration pneumonia  Respiratory culture MSSA     Procal 0.7-->0.2    3. R arm mild cellulitis, improved  4. Rhabdomyolysis, improving   5. Transaminitis, trending down  6. PMHx: thyroid cancer, frontal temporal dementia, hypertension, hyperlipidemia, depression/anxiety   This patient is high risk for deterioration and death.    Recommendations:    Unasyn 3g IV q6h-it covers the MSSA and broader anaerobic coverage, given recent aspiration.    May switch to keflex 1 gram tid , when  able to  take po  Complete total of 10-14 days of antibiotics total  Estimated end date of antibiotics  11/12/2022.    Work on preventing future infections:  Aspiration precautions  Keep head of the bed elevated, although this is very hard this patient moves a lot and slides down the bed  Oral hygiene  Incentive spirometry    Poor prognosis due to his neurologic disease .  Wife understands , but at the same time she seems to be in denial.  I have urged her yesterday, to discuss with neurologist.    Will sign off.          Medical Decision Making during this encounter was  [_] Low Complexity  [_] Moderate Complexity  [xx] High Complexity

## 2022-11-07 NOTE — SUBJECTIVE & OBJECTIVE
Interval History: No major changes    Past Medical History:   Diagnosis Date    Cancer     Dementia     Depression     Hypertension        Past Surgical History:   Procedure Laterality Date    Lymph node Ca removed         Review of patient's allergies indicates:  No Known Allergies    Medications:  Continuous Infusions:   Amino acid 5% - dextrose 15% (CLINIMIX-E) solution with additives.  CENTRAL LINE ONLY (1395 mOsm/L) 70 mL/hr at 11/08/22 0759    Amino acid 5% - dextrose 15% (CLINIMIX-E) solution with additives.  CENTRAL LINE ONLY (1395 mOsm/L)      lactated ringers 25 mL/hr at 11/08/22 0759     Scheduled Meds:   ampicillin-sulbactim (UNASYN) IVPB  3 g Intravenous Q6H    dantrolene (DANTRIUM) IVPB  1 mg/kg Intravenous Daily    enoxaparin  40 mg Subcutaneous Daily    fat emulsion 20%  250 mL Intravenous Daily    levothyroxine  44 mcg Intravenous Daily    mupirocin   Nasal BID    scopolamine  1 patch Transdermal Q3 Days    senna-docusate 8.6-50 mg  1 tablet Oral BID    sodium chloride 0.9%  10 mL Intravenous Q6H    valproate sodium (DEPACON) IVPB  250 mg Intravenous Q8H     PRN Meds:acetaminophen, aluminum-magnesium hydroxide-simethicone, bisacodyL, dextrose 10%, dextrose 10%, glucagon (human recombinant), glucose, glucose, labetaloL, lorazepam, naloxone, ondansetron, sodium chloride 0.9%, Flushing PICC Protocol **AND** sodium chloride 0.9% **AND** sodium chloride 0.9%    Family History    None       Tobacco Use    Smoking status: Not on file    Smokeless tobacco: Not on file   Substance and Sexual Activity    Alcohol use: Not on file    Drug use: Not on file    Sexual activity: Not on file       Review of Systems   Unable to perform ROS: Mental status change   Objective:     Vital Signs (Most Recent):  Temp: 98.6 °F (37 °C) (11/08/22 1100)  Pulse: 66 (11/08/22 1100)  Resp: 14 (11/08/22 1100)  BP: (!) 140/70 (11/08/22 1100)  SpO2: 96 % (11/08/22 1100)   Vital Signs (24h Range):  Temp:  [97 °F (36.1 °C)-98.6 °F (37  °C)] 98.6 °F (37 °C)  Pulse:  [58-84] 66  Resp:  [14-20] 14  SpO2:  [96 %-98 %] 96 %  BP: (129-174)/(65-98) 140/70     Weight: 56.7 kg (125 lb)  Body mass index is 19.58 kg/m².    Physical Exam  Constitutional:       General: He is not in acute distress.     Appearance: He is ill-appearing. He is not toxic-appearing.   HENT:      Head: Normocephalic and atraumatic.      Right Ear: External ear normal.      Left Ear: External ear normal.      Mouth/Throat:      Mouth: Mucous membranes are dry.      Pharynx: Oropharynx is clear. No oropharyngeal exudate or posterior oropharyngeal erythema.   Eyes:      General:         Right eye: No discharge.         Left eye: No discharge.      Extraocular Movements: Extraocular movements intact.   Cardiovascular:      Rate and Rhythm: Normal rate and regular rhythm.   Pulmonary:      Effort: Pulmonary effort is normal. No respiratory distress.      Breath sounds: No wheezing.   Abdominal:      General: There is no distension.      Palpations: Abdomen is soft.   Musculoskeletal:         General: No swelling or tenderness.      Cervical back: Neck supple. No tenderness.   Skin:     General: Skin is warm and dry.   Neurological:      Comments: obtunded       Review of Symptoms      Symptom Assessment (ESAS 0-10 Scale)  Pain:  0  Dyspnea:  0  Anxiety:  0  Nausea:  0  Depression:  0  Anorexia:  0  Fatigue:  0  Insomnia:  0  Restlessness:  0  Agitation:  0  Unable to complete assessment due to Mental status change         Pain Assessment in Advanced Demential Scale (PAINAD)   Breathing - Independent of vocalization:  0  Negative vocalization:  0  Facial expression:  0  Body language:  1  Consolability:  0  Total:  1    Performance Status:  10    Living Arrangements:  Lives with spouse     Time-Based Charting:  Yes  Chart Review: 10 minutes  Face to Face: 60 minutes  Coordination of Care: 5 minutes    Total Time Spent: 75 minutes      Advance Care Planning   Advance Directives:   Do Not  Resuscitate Status: Yes      Decision Making:  Family answered questions  Goals of Care: The family endorses that what is most important right now is to focus on comfort and QOL     Accordingly, we have decided that the best plan to meet the patient's goals includes enrolling in hospice care         Significant Labs: BMP:   Recent Labs   Lab 11/07/22  0808   *      K 3.4*      CO2 23   BUN 16   CREATININE 0.7   CALCIUM 8.5*       CBC:   Recent Labs   Lab 11/07/22  0808   WBC 10.73   HGB 11.3*   HCT 33.7*          CBC:   Recent Labs   Lab 11/07/22  0808   WBC 10.73   HGB 11.3*   HCT 33.7*   MCV 91          BMP:  No results for input(s): GLU, NA, K, CL, CO2, BUN, CREATININE, CALCIUM, MG in the last 24 hours.    LFT:  Lab Results   Component Value Date    AST 30 11/03/2022    ALKPHOS 71 11/03/2022    BILITOT 0.7 11/03/2022     Albumin:   Albumin   Date Value Ref Range Status   11/03/2022 2.3 (L) 3.5 - 5.2 g/dL Final     Protein:   Total Protein   Date Value Ref Range Status   11/03/2022 5.4 (L) 6.0 - 8.4 g/dL Final     Lactic acid:   Lab Results   Component Value Date    LACTATE 1.0 11/02/2022    LACTATE 1.2 10/29/2022       Significant Imaging: I have reviewed all pertinent imaging results/findings within the past 24 hours.

## 2022-11-07 NOTE — ASSESSMENT & PLAN NOTE
Working diagnosis in the setting of fever, muscle rigidity, AMS and exposure to multiple anti-psychotics   Infection workup in process - MSSA pna covered w/ abx but otherwise not yielding etiology  CSF findings overall unremarkable  Dantrolene IV q6 > q8 > q12 > q24 - dosing per neurology  Cannot take bromocriptine due to NPO/mental status

## 2022-11-07 NOTE — PLAN OF CARE
Problem: Fall Injury Risk  Goal: Absence of Fall and Fall-Related Injury  Outcome: Ongoing, Progressing     Problem: Restraint, Nonbehavioral (Nonviolent)  Goal: Absence of Harm or Injury  Outcome: Ongoing, Progressing     Problem: Adult Inpatient Plan of Care  Goal: Plan of Care Review  Outcome: Ongoing, Progressing     Problem: Skin Injury Risk Increased  Goal: Skin Health and Integrity  Outcome: Ongoing, Progressing     Problem: Malnutrition  Goal: Improved Nutritional Intake  Outcome: Ongoing, Progressing       TPN continues at 70 ml/hr per order.  Soft wrist restraints in use per order. Pt restless and combative at times.  Repositioned q 2 hours.  Heels floated.  Avasys/bed alarm in use for safety.

## 2022-11-07 NOTE — ASSESSMENT & PLAN NOTE
I met with Mr. Caballero at bedside. He lacks capacity for complex medical decision making. I met with his wife, daughter, and son at bedside.     I updated them on his care to this point. I again shared my concern that his current state (a state of dependence) is likely close to the state he will remain the rest of his life. Based on his values as previously discussed I explained my worry that any further medical procedure or intervention will result in prolonging suffering. I made a recommendation to continue further care with hospice. They understood and agreed.     I answered several questions about hospice. I did specifically address artifical nutrition and tube feedings in his case; I made a strong recommendation against these measures. They understood.    They have contacted potential sitters and are hopeful to pursue hospice at home. SD/MARILYN and primary team aware.    I appreciate being involved. I hope I have been helpful.

## 2022-11-07 NOTE — PLAN OF CARE
Pts nurse Claudia Ferrell with Palliative care to let Dr. Olvera know that the pts spouse , daughter and son in law are all at bedside and available to speak to him. CM following.    11/07/22 1041   Discharge Assessment   Assessment Type Discharge Planning Reassessment

## 2022-11-07 NOTE — ASSESSMENT & PLAN NOTE
Sharp change in status on admit from baseline. At this point mostly suspecting NMS on progressive FTD  Little to no improvement noted  CT of abdomen and pelvis - left kidney mass vs complex cyst, non obstructing stone   Head CT and brain MRi reviewed  IVF decreased 2/2 large volume intake overall  Neurology consulted and following  Psychiatry consult placed - patient cannot participate in current state - possible medication recommendation for FTD as to cause NMS in future   No haldol given here  Nightly ativan decreased and discontinued now (was taking home xanax)  Restraints continued due to danger to self  Tele monitoring   See fever section for CNS infx rule out  See NMS section as well

## 2022-11-07 NOTE — ASSESSMENT & PLAN NOTE
Follows neurologist in [place] with recent increases in meds PTA - zyprexa 2.5mg to 10mg BID + Prozac and trileptal   Trileptal level <1   See above

## 2022-11-07 NOTE — PLAN OF CARE
11/06/22 2031   Patient Assessment/Suction   Level of Consciousness (AVPU) alert   Respiratory Effort Normal;Unlabored   Expansion/Accessory Muscles/Retractions expansion symmetric   All Lung Fields Breath Sounds coarse  (clears with NT suction)   Cough Frequency with stimulation   Cough Type loose   Suction Method tracheal   $ Suction Charges NT Suction Procedure   Secretions Amount small   Secretions Color pale;yellow   Secretions Characteristics thick   PRE-TX-O2   O2 Device (Oxygen Therapy) room air   SpO2 97 %   Pulse Oximetry Type Intermittent

## 2022-11-08 PROCEDURE — 63600175 PHARM REV CODE 636 W HCPCS: Performed by: HOSPITALIST

## 2022-11-08 PROCEDURE — 25000003 PHARM REV CODE 250: Performed by: HOSPITALIST

## 2022-11-08 PROCEDURE — B4185 PARENTERAL SOL 10 GM LIPIDS: HCPCS | Performed by: STUDENT IN AN ORGANIZED HEALTH CARE EDUCATION/TRAINING PROGRAM

## 2022-11-08 PROCEDURE — 63600175 PHARM REV CODE 636 W HCPCS: Performed by: NURSE PRACTITIONER

## 2022-11-08 PROCEDURE — 99233 SBSQ HOSP IP/OBS HIGH 50: CPT | Mod: ,,, | Performed by: INTERNAL MEDICINE

## 2022-11-08 PROCEDURE — 25000003 PHARM REV CODE 250: Performed by: STUDENT IN AN ORGANIZED HEALTH CARE EDUCATION/TRAINING PROGRAM

## 2022-11-08 PROCEDURE — A4217 STERILE WATER/SALINE, 500 ML: HCPCS | Performed by: STUDENT IN AN ORGANIZED HEALTH CARE EDUCATION/TRAINING PROGRAM

## 2022-11-08 PROCEDURE — 63600175 PHARM REV CODE 636 W HCPCS: Performed by: STUDENT IN AN ORGANIZED HEALTH CARE EDUCATION/TRAINING PROGRAM

## 2022-11-08 PROCEDURE — 99233 PR SUBSEQUENT HOSPITAL CARE,LEVL III: ICD-10-PCS | Mod: ,,, | Performed by: INTERNAL MEDICINE

## 2022-11-08 PROCEDURE — A4216 STERILE WATER/SALINE, 10 ML: HCPCS | Performed by: HOSPITALIST

## 2022-11-08 PROCEDURE — 94761 N-INVAS EAR/PLS OXIMETRY MLT: CPT

## 2022-11-08 PROCEDURE — 12000002 HC ACUTE/MED SURGE SEMI-PRIVATE ROOM

## 2022-11-08 RX ORDER — LORAZEPAM 2 MG/ML
1 INJECTION INTRAMUSCULAR ONCE
Status: COMPLETED | OUTPATIENT
Start: 2022-11-08 | End: 2022-11-08

## 2022-11-08 RX ORDER — LORAZEPAM 2 MG/ML
0.5 INJECTION INTRAMUSCULAR EVERY 12 HOURS PRN
Status: DISCONTINUED | OUTPATIENT
Start: 2022-11-08 | End: 2022-11-11 | Stop reason: HOSPADM

## 2022-11-08 RX ADMIN — AMPICILLIN SODIUM AND SULBACTAM SODIUM 3 G: 2; 1 INJECTION, POWDER, FOR SOLUTION INTRAMUSCULAR; INTRAVENOUS at 10:11

## 2022-11-08 RX ADMIN — ENOXAPARIN SODIUM 40 MG: 100 INJECTION SUBCUTANEOUS at 05:11

## 2022-11-08 RX ADMIN — MUPIROCIN: 20 OINTMENT TOPICAL at 11:11

## 2022-11-08 RX ADMIN — AMPICILLIN SODIUM AND SULBACTAM SODIUM 3 G: 2; 1 INJECTION, POWDER, FOR SOLUTION INTRAMUSCULAR; INTRAVENOUS at 05:11

## 2022-11-08 RX ADMIN — SODIUM CHLORIDE, PRESERVATIVE FREE 10 ML: 5 INJECTION INTRAVENOUS at 05:11

## 2022-11-08 RX ADMIN — LEVOTHYROXINE SODIUM 44 MCG: 20 INJECTION, SOLUTION INTRAVENOUS at 11:11

## 2022-11-08 RX ADMIN — DANTROLENE SODIUM 55 MG: 20 INJECTION INTRAVENOUS at 12:11

## 2022-11-08 RX ADMIN — DEXTROSE 250 MG: 50 INJECTION, SOLUTION INTRAVENOUS at 11:11

## 2022-11-08 RX ADMIN — DEXTROSE 250 MG: 50 INJECTION, SOLUTION INTRAVENOUS at 03:11

## 2022-11-08 RX ADMIN — SOYBEAN OIL 250 ML: 20 INJECTION, SOLUTION INTRAVENOUS at 10:11

## 2022-11-08 RX ADMIN — AMPICILLIN SODIUM AND SULBACTAM SODIUM 3 G: 2; 1 INJECTION, POWDER, FOR SOLUTION INTRAMUSCULAR; INTRAVENOUS at 11:11

## 2022-11-08 RX ADMIN — LORAZEPAM 0.5 MG: 2 INJECTION INTRAMUSCULAR; INTRAVENOUS at 03:11

## 2022-11-08 RX ADMIN — ASCORBIC ACID, VITAMIN A PALMITATE, CHOLECALCIFEROL, THIAMINE HYDROCHLORIDE, RIBOFLAVIN-5 PHOSPHATE SODIUM, PYRIDOXINE HYDROCHLORIDE, NIACINAMIDE, DEXPANTHENOL, ALPHA-TOCOPHEROL ACETATE, VITAMIN K1, FOLIC ACID, BIOTIN, CYANOCOBALAMIN: 200; 3300; 200; 6; 3.6; 6; 40; 15; 10; 150; 600; 60; 5 INJECTION, SOLUTION INTRAVENOUS at 10:11

## 2022-11-08 RX ADMIN — MUPIROCIN: 20 OINTMENT TOPICAL at 08:11

## 2022-11-08 RX ADMIN — LORAZEPAM 1 MG: 2 INJECTION INTRAMUSCULAR; INTRAVENOUS at 09:11

## 2022-11-08 RX ADMIN — DEXTROSE 250 MG: 50 INJECTION, SOLUTION INTRAVENOUS at 07:11

## 2022-11-08 RX ADMIN — SODIUM CHLORIDE, PRESERVATIVE FREE 10 ML: 5 INJECTION INTRAVENOUS at 11:11

## 2022-11-08 NOTE — PLAN OF CARE
MARILYN called [facility] hospice to follow up on referral. Spoke with director, Mariana. States the manager that pulls referrals from [facility] is out and asked me to hard fax referral to [phone]. Mariana verified that she has spoken with someone from this patient's family and they were in the works for a DC on Friday. I explained that pt is ready to leave this facility once hospice is arranged and needs to be done before Friday if at all possible.   MARILYN faxed hard faxed referral as requested       11/08/22 0279   Post-Acute Status   Post-Acute Authorization Hospice   Hospice Status Pending post acute provider review/more information requested

## 2022-11-08 NOTE — CARE UPDATE
11/08/22 0827   Patient Assessment/Suction   Level of Consciousness (AVPU) responds to voice   Respiratory Effort Unlabored   All Lung Fields Breath Sounds   (does not need suctioning at this time)   PRE-TX-O2   O2 Device (Oxygen Therapy) room air   SpO2 98 %   Pulse Oximetry Type Intermittent   $ Pulse Oximetry - Multiple Charge Pulse Oximetry - Multiple   Pulse 69   Resp 18

## 2022-11-08 NOTE — PROGRESS NOTES
[facility]  Adult Nutrition  Progress Note    SUMMARY     Recommendations   1) As family desired continue TPN until pt discharges  D15 AA 5 @ 70 ml/hr + standard lipids   ( provides 84 g protein ( 100% EPN), 1643 kcal ( 100% EEN))     2)PO diet if medically able per care team discretion    Goals: 1) Nutrition support meeting > 75% of needs at f/u  Nutrition Goal Status: met- no longer following  Communication of RD Recs: reviewed with physician (POC, sticky note, second sign)    Assessment and Plan    Moderate malnutrition  Contributing Nutrition Diagnosis  Moderate chronic condition related malnutrition     Related to (etiology):   AMS     Signs and Symptoms (as evidenced by):   1) PO intakes < 75% of needs x > 1 month  2) mild wasting as charted below     Interventions:  Collaboration with other providers     Nutrition Diagnosis Status:   continues    Malnutrition Assessment     Skin (Micronutrient):  (Jamin = 10, abrasions of feet, buttocks, redness of upper arm)   Micronutrient Evaluation: suspected deficiency  Micronutrient Evaluation Comments: check iron, K, Phos   Energy Intake (Malnutrition): less than 75% for greater than or equal to 1 month   Orbital Region (Subcutaneous Fat Loss): mild depletion  Upper Arm Region (Subcutaneous Fat Loss): mild depletion  Thoracic and Lumbar Region: mild depletion   [place] Region (Muscle Loss): mild depletion  Clavicle Bone Region (Muscle Loss): mild depletion  Clavicle and Acromion Bone Region (Muscle Loss): mild depletion  Scapular Bone Region (Muscle Loss): mild depletion  Dorsal Hand (Muscle Loss): mild depletion  Patellar Region (Muscle Loss): mild depletion  Anterior Thigh Region (Muscle Loss): mild depletion   Edema (Fluid Accumulation): 0-->no edema present   Subcutaneous Fat Loss (Final Summary): mild protein-calorie malnutrition  Muscle Loss Evaluation (Final Summary): mild protein-calorie malnutrition         Reason for Assessment    Reason  "For Assessment: follow up  Diagnosis:  (dementia with behavioral disturbances)  Relevant Medical History: Head and neck CA, HTN, depression, dysphagia s/p PEG ( removed Nov. 2012), dementia  Interdisciplinary Rounds: attended    General Information Comments: 73 y/o male admitted with AMS, in restraints, confused and agitated. SLP rec. NPO, 3 days fo poor PO intakes. Decline in mental status, not alert enough to eat PO or follow commands for NG placement, also with difficulties s/p neck surgery- plan for PICC placement for TPN per MD. PTA pt was at [facility] and noted to have poor appetite, prior to this he was at home and ate well on a soft food diet. NFPE 11/1/22 mild wasting seen. No significant wt loss per chart review.  11/3/22 Pt is not responding to commands, not appropriate/alert enough for SLP eval x 3 days, remains NPO. TPN continues.  11/8/22 Pt continues to be disoriented and in restrains, cannot follow commands. TPN continues per MD. Plan for discharge home with hospice. No plan for PEG.    Nutrition Discharge Planning: To be determined- liberalized PO diet if medically able per care team discretion    Nutrition Risk Screen    Nutrition Risk Screen: tube feeding or parenteral nutrition    Nutrition/Diet History    Patient Reported Diet/Restrictions/Preferences: soft  Spiritual, Cultural Beliefs, Anabaptist Practices, Values that Affect Care: no  Food Allergies: NKFA  Factors Affecting Nutritional Intake: impaired cognitive status/motor control, difficulty/impaired swallowing, inability to feed self    Anthropometrics    Temp: 98.6 °F (37 °C)  Height Method: Measured  Height: 5' 7"  Height (inches): 67 in  Weight Method: Bed Scale  Weight: 56.7 kg (125 lb)  Weight (lb): 125 lb  Ideal Body Weight (IBW), Male: 148 lb  % Ideal Body Weight, Male (lb): 84.46 %  BMI (Calculated): 19.6  BMI Grade: 18.5-24.9 - normal       Lab/Procedures/Meds    Pertinent Labs Reviewed: reviewed  BMP  Lab Results "   Component Value Date     11/07/2022    K 3.4 (L) 11/07/2022     11/07/2022    CO2 23 11/07/2022    BUN 16 11/07/2022    CREATININE 0.7 11/07/2022    CALCIUM 8.5 (L) 11/07/2022    ANIONGAP 7 (L) 11/07/2022    EGFRNONAA >60 11/17/2020     No results for input(s): POCTGLUCOSE in the last 24 hours.  Lab Results   Component Value Date    ALBUMIN 2.3 (L) 11/03/2022         Pertinent Medications Reviewed: reviewed  Zofran, lactated ringers @ 25 ml/hr, senna, scopolamine    Estimated/Assessed Needs    Weight Used For Calorie Calculations: 56.7 kg (125 lb)  Energy Calorie Requirements (kcal): MSJ ( x 1.2-1.4) = 1925-0227 kcal  Energy Need Method: [place]-St Jeor  Protein Requirements: 1.2-1.4 g protein/kg ( age/wasting) = 68-79 g  Weight Used For Protein Calculations: 56.7 kg (125 lb)  Fluid Requirements (mL): 2455-4900 ml or per MD  Estimated Fluid Requirement Method: RDA Method  CHO Requirement: N/A      Nutrition Prescription Ordered    Current Diet Order: NPO x 8 days + TPN above    Evaluation of Received Nutrient/Fluid Intake    Energy Calories Required: meeting needs  Protein Required: meeting needs  Fluid Required: meeting needs  Total Fluid Intake (mL/kg): IVF @ 25 ml/hr + TPN @ 70 ml/hr  Tolerance: NPO, tolerating PN    Intake/Output Summary (Last 24 hours) at 11/8/2022 1345  Last data filed at 11/8/2022 1025  Gross per 24 hour   Intake 2948.95 ml   Output 1850 ml   Net 1098.95 ml       % Intake of Estimated Energy Needs: 100%  % Meal Intake: NPO + TPN    Nutrition Risk    Level of Risk/Frequency of Follow-up: Hospice/CMO- x 10 days      Monitor and Evaluation    Food and Nutrient Intake: energy intake  Food and Nutrient Adminstration: diet order, enteral and parenteral nutrition administration  Anthropometric Measurements: weight  Biochemical Data, Medical Tests and Procedures: electrolyte and renal panel, gastrointestinal profile, glucose/endocrine profile  Nutrition-Focused Physical Findings:  overall appearance       Nutrition Follow-Up    RD Follow-up?: Yes

## 2022-11-08 NOTE — SUBJECTIVE & OBJECTIVE
Interval History: Patient seen and examined. Had some agitation last night got ativan with some relief. Resting currently. Good family support continues at bedside. To have additional family meeting today with palliative. Wife considering taking patient home with hospice and sitters but further decision TBD.    Review of Systems   Unable to perform ROS: Mental status change   Objective:     Vital Signs (Most Recent):  Temp: 98.3 °F (36.8 °C) (11/08/22 0951)  Pulse: 84 (11/08/22 0951)  Resp: 18 (11/08/22 0951)  BP: (!) 129/98 (11/08/22 0951)  SpO2: 98 % (11/08/22 0827)   Vital Signs (24h Range):  Temp:  [97 °F (36.1 °C)-98.5 °F (36.9 °C)] 98.3 °F (36.8 °C)  Pulse:  [58-84] 84  Resp:  [18-20] 18  SpO2:  [96 %-98 %] 98 %  BP: (129-174)/(65-98) 129/98     Weight: 56.7 kg (125 lb)  Body mass index is 19.58 kg/m².    Intake/Output Summary (Last 24 hours) at 11/8/2022 1143  Last data filed at 11/8/2022 1025  Gross per 24 hour   Intake 2948.95 ml   Output 1850 ml   Net 1098.95 ml      Physical Exam  Constitutional:       General: He is not in acute distress.     Appearance: He is ill-appearing.      Comments: In restraints   HENT:      Head: Normocephalic and atraumatic.      Mouth/Throat:      Mouth: Mucous membranes are dry.   Cardiovascular:      Rate and Rhythm: Normal rate and regular rhythm.   Pulmonary:      Effort: Pulmonary effort is normal. No respiratory distress.      Breath sounds: No wheezing.   Abdominal:      General: Abdomen is flat. Bowel sounds are normal. There is no distension.      Palpations: Abdomen is soft.      Tenderness: There is no abdominal tenderness. There is no guarding or rebound.   Musculoskeletal:         General: No swelling or tenderness.   Skin:     General: Skin is warm and dry.   Neurological:      GCS: GCS eye subscore is 1. GCS verbal subscore is 2. GCS motor subscore is 5.      Comments: Obtunded  Not able to cooperate  Making incomprehensible noises  Overall increased tone in  musculature       Significant Labs: All pertinent labs within the past 24 hours have been reviewed.    Significant Imaging: I have reviewed all pertinent imaging results/findings within the past 24 hours.

## 2022-11-08 NOTE — PLAN OF CARE
11/07/22 2016   Patient Assessment/Suction   Level of Consciousness (AVPU) responds to voice   Respiratory Effort Normal;Unlabored   Expansion/Accessory Muscles/Retractions expansion symmetric   All Lung Fields Breath Sounds diminished   Rhythm/Pattern, Respiratory pattern regular   $ Suction Charges   (NT sx not needed at this time)   PRE-TX-O2   O2 Device (Oxygen Therapy) room air   SpO2 97 %   Pulse Oximetry Type Intermittent

## 2022-11-08 NOTE — PROGRESS NOTES
[facility] Medicine  Progress Note    Patient Name: Ronn Caballero Jr.  MRN: [id number]  Patient Class: IP- Inpatient   Admission Date: 10/29/2022  Length of Stay: 10 days  Attending Physician: Jefferson Mehta MD  Primary Care Provider: JELLY Decker        Subjective:     Principal Problem:Dementia with behavioral disturbance        HPI:  71 yo male with h/o thyroid cancer, frontal temporal lobe dementia, HTN, HLP, depression and anxiety transferred from [facility] due to 6 days of failure to thrive. It is reported from the facility that he was PEC/CEC (expires 11/3/22) due to combative behavior at home. History comes from the patient's spouse and sister-in -law at bedside. Patient follows a neurologist in [place] and spouse reports recent change in medications include increase of zyprexa from 2.5mg to 10mg BID. He was also started on gabapentin TID. Spouse denies recent fever, chills, cough, D/N/V.  Per the MAR from [facility] he received Haldol x4 IM, benadryl and ativan IV x2. The facility reports he was not eating or drinking and no witnessed episodes of diarrhea or vomiting. On our workup he has Na153, elevated  AST, hematuria, WBC 17k with 2% bands, lactic acid 1.2. No source of infection on UA or chest xray. On exam he does show discomfort in RUQ and midepigastrium. Abd CT and lipase pending. Troponin mildly elevated 0.056 with some EKG changes.     Hospital medicine consulted for further medical management. Physical restraints will be used to protect patient as he is pulling at cardiac leads and peripheral IV. Neurology consulted. Needs tele sitter.       Overview/Hospital Course:  Patient admitted from [facility] facility with failure to thrive and progressive psychosis. He has h/o frontal temporal dementia. Overnight he was given IV ativan and has no improvement in behavior. Patient unable to participate in a telepsych visit.  Neurology was consulted on guidance to medications. Appears he was given a combination of meds in addition to his home medication and possibly polypharmacy resulted in psychosis. He does require restraints as he is a harm to himself. Will try to resume home medications if he can follow commands to swallow pills. Electrolyte replacement by IV. IVF for hypernatremia and elevated CPK >3000. Monitoring on tele due to QT prolongation. The spouse and daughter at bedside and agree to palliative care discussion in regards to goals of care and advanced care planning.     Fevers persistent - started on amp, rocephin, vanc and acyclovir for concern of CNS infection. LP completed and studies pending. Brain MRI: poor study due to motion artifact, generalized volume loss. Zyprexa dc'd, concern for NMS. Ativan IV Ordered. Restraints remain in place. Picc line placed to E. TPN ordered for nutrition. Unable to take oral meds and diet due to mental status. CK trending down 1700. Na 144.     Meningitis antibiotics dc'd, ativan weaning, TPN infusing, CXR did not show infiltrates, continues to spike fevers.  Psychiatry consult placed, though not able to do if patient cannot communicate on camera. Resp culture pending, unasyn IV should cover. Continuing dantrolene. With drop in O2 sats on RA, place on supplemental O2. CK trending down. LA normal.     PEC/CEC dc'd/. Fevers resolved. Resp culture + staph - MSSA. ID, neurology and psychiatry following. Depakote IV for mood stabilization. Prefer to not try anti-psychotics and SSRi in setting of possible NMS. Palliative care had meeting with patient/family made DNR.     Overall impression is progression of already moderate to severe dementia with new insult from NMS. Continues obtunded not following commands. TPN and restraints continued. To continue with supportive care and re-evaluate goals of care. Dantrolene weaned off. Ativan scheduled dc'd but to be used prn. Palliative to  re-visit.      Interval History: Patient seen and examined. Had some agitation last night got ativan with some relief. Resting currently. Good family support continues at bedside. To have additional family meeting today with palliative. Wife considering taking patient home with hospice and sitters but further decision TBD.    Review of Systems   Unable to perform ROS: Mental status change   Objective:     Vital Signs (Most Recent):  Temp: 98.3 °F (36.8 °C) (11/08/22 0951)  Pulse: 84 (11/08/22 0951)  Resp: 18 (11/08/22 0951)  BP: (!) 129/98 (11/08/22 0951)  SpO2: 98 % (11/08/22 0827)   Vital Signs (24h Range):  Temp:  [97 °F (36.1 °C)-98.5 °F (36.9 °C)] 98.3 °F (36.8 °C)  Pulse:  [58-84] 84  Resp:  [18-20] 18  SpO2:  [96 %-98 %] 98 %  BP: (129-174)/(65-98) 129/98     Weight: 56.7 kg (125 lb)  Body mass index is 19.58 kg/m².    Intake/Output Summary (Last 24 hours) at 11/8/2022 1143  Last data filed at 11/8/2022 1025  Gross per 24 hour   Intake 2948.95 ml   Output 1850 ml   Net 1098.95 ml      Physical Exam  Constitutional:       General: He is not in acute distress.     Appearance: He is ill-appearing.      Comments: In restraints   HENT:      Head: Normocephalic and atraumatic.      Mouth/Throat:      Mouth: Mucous membranes are dry.   Cardiovascular:      Rate and Rhythm: Normal rate and regular rhythm.   Pulmonary:      Effort: Pulmonary effort is normal. No respiratory distress.      Breath sounds: No wheezing.   Abdominal:      General: Abdomen is flat. Bowel sounds are normal. There is no distension.      Palpations: Abdomen is soft.      Tenderness: There is no abdominal tenderness. There is no guarding or rebound.   Musculoskeletal:         General: No swelling or tenderness.   Skin:     General: Skin is warm and dry.   Neurological:      GCS: GCS eye subscore is 1. GCS verbal subscore is 2. GCS motor subscore is 5.      Comments: Obtunded  Not able to cooperate  Making incomprehensible noises  Overall  increased tone in musculature       Significant Labs: All pertinent labs within the past 24 hours have been reviewed.    Significant Imaging: I have reviewed all pertinent imaging results/findings within the past 24 hours.      Assessment/Plan:      * Dementia with behavioral disturbance  Sharp change in status on admit from baseline. At this point mostly suspecting NMS on progressive FTD  Little to no improvement noted  CT of abdomen and pelvis - left kidney mass vs complex cyst, non obstructing stone   Head CT and brain MRi reviewed  IVF decreased 2/2 large volume intake overall  Neurology consulted and following  Psychiatry consult placed - patient cannot participate in current state - possible medication recommendation for FTD as to cause NMS in future   No haldol given here  Nightly ativan discontinued now only prn (was taking home xanax)  Restraints continued due to danger to self  Tele monitoring   See fever section for CNS infx rule out  See NMS section as well    Frontotemporal dementia  Followed with neurologist in [place] with recent increases in meds PTA - zyprexa 2.5mg to 10mg BID + Prozac and trileptal   Trileptal level <1   See above    Fever  CXR:  PICC placed in good position.  1.5 cm density projecting over the right mid lung feel likely represents overlapping shadows but follow-up chest x-rays are recommended to assure resolution and absence of a pulmonary lesion  CTA head and neck :  4. Right upper lobe ground-glass opacities in patchy consolidation, nonspecific and may reflect sequelae of small airways inflammation or infection.   UA reflex to culture - NTD  Blood culture- NTD  CSF - negative      Ampicillin, acyclovir, rocephin and vancomycin - dc'd  Resp did deep suction - MSSA covered with Unasyn, vanc dc'd now again, ID rec 10-14 day course  Concern for NMS - see NMS section    NMS (neuroleptic malignant syndrome)  Working diagnosis in the setting of fever, muscle rigidity, AMS and  exposure to multiple anti-psychotics   Infection workup - MSSA pna covered w/ abx but otherwise not yielding etiology  CSF findings overall unremarkable  Dantrolene IV q6 > q8 > q12 > q24 > dc'd - dosing per neurology  Cannot take bromocriptine due to NPO/mental status     Non-traumatic rhabdomyolysis  resolved    Moderate malnutrition  Nutrition consulted. Most recent weight and BMI monitored-      Malnutrition (Moderate to Severe)  Energy Intake (Malnutrition): less than 75% for greater than or equal to 1 month    Final Summary  Subcutaneous Fat Loss (Final Summary): mild protein-calorie malnutrition  Muscle Loss Evaluation (Final Summary): mild protein-calorie malnutrition         Measurements:  Wt Readings from Last 1 Encounters:   11/01/22 56.7 kg (125 lb)   Body mass index is 19.58 kg/m².    Recommendations: Recommendation/Intervention: 1) Start TPN via PICC when placed: D15 AA 5 @ 70 ml/hr + standard lipids ( provides 84 g protein ( 100% EPN), 1643 kcal ( 100% EEN)) 2) If pt remains NPO and NGT placement become possible or PEG needed longterm rec TF: Peptamen 1.5 Prebio @ 20 ml/hr advancing to goal of 45 ml/hr + 130 ml flush q 4 hr  ( provides 1620 kcal ( 100% EEN), 73 g protein ( 100% EPN),and 831 ml free water) 3) If AMS improves and able to advance diet rec. regular, texure per SLP to encourage PO intakes 4) weigh weekly  Goals: 1) Nutrition support meeting > 75% of needs at f/u    Patient has been screened and assessed by RD. RD will follow patient.    TPN until patient is alert for oral diet vs peg tube placement     Goals of care, counseling/discussion  Spouse decided on DNR   Appreciate palliative care meeting with family - to meet again today  Considering home with hospice vs inpatient hospice  CM following    Bandemia  See fever section    Postoperative hypothyroidism  Resume synthroid - IV due to NPo status   FT4 - normal     Hypernatremia  Resolved with IVF    Failure to thrive in adult  Not taking  PO  TPN for nutrition   Progression of dementia  Palliative care discussion - DNR  If he does not recover neurologically, spouse will need to make decision regarding feeding tube vs hospice    essential hypertension  Chronic, controlled  Not taking po  IV labetalol PRN       VTE Risk Mitigation (From admission, onward)         Ordered     enoxaparin injection 40 mg  Daily         10/29/22 1513     IP VTE HIGH RISK PATIENT  Once         10/29/22 1513     Place sequential compression device  Until discontinued         10/29/22 1513                Discharge Planning   TAYLOR: 11/9/2022     Code Status: DNR   Is the patient medically ready for discharge?:     Reason for patient still in hospital (select all that apply): Patient trending condition and Pending disposition  Discharge Plan A: Psychiatric hospital          Jefferson Mehta MD  Department of Hospital Medicine   [facility]

## 2022-11-08 NOTE — PLAN OF CARE
POC discussed with pt, pt verbalized understanding. Oriented x4. PICC cdi, TPN and lipids infusing. Disoriented. Condom cath on and draining yellow urine. NPO per order. Mouth care provided. Neuro check done, no changes. Dressing to wounds cdi. Soft restraints intact, no injuries noted, pads between arm and restraint for protection. No signs of pain. Pt repositioned. Avasis for safety. Bed alarm set and safety maintained. Will continue to monitor.

## 2022-11-08 NOTE — NURSING
Pt continuing to pull at his lines, climb out of his bed, and hit/kick at staff. Notified, NP restraint order continued/reordered.

## 2022-11-08 NOTE — NURSING
Wound care accessed patient. Nurse Chema at bedside. No change to orders. No new breakdown noted. Wound prevention care applied to sacrum. Boarder foam applied to bilateral heels for protection.

## 2022-11-08 NOTE — PLAN OF CARE
CM met with pt's spouse, [place], at bedside to discuss discharge planning. MARILYN explained that I am following up from palliative care meeting and will be the one assisting with helping with DC to home with hospice. States she was just talking with Mariana from [facility] and is working out when informational visit will be. States she is trying to arrange for Friday. I explained that ideally the informational visit needs be sooner due to equip that will be need to be delivered prior to DC like hospital bed, o2, or anything else they need. I explained that equip can take a day to get delivered at times so will need a reasonable timeframe for that to be set up. I also explained that he is reaching the point where he no longer needs to be in the acute care setting so all services need to be in the works. I explained that they are not being pushed out quickly but do need to move forward with hospice if that's what they have decided on. Georgia states that she will call hospice back this afternoon to arrange visit and will call me in AM to let me know for when its scheduled for        11/08/22 0475   Post-Acute Status   Post-Acute Authorization Hospice   Hospice Status Pending education

## 2022-11-08 NOTE — PROGRESS NOTES
"[facility]  Palliative Medicine  Progress Note    Patient Name: Ronn Caballero Jr.  MRN: [id number]  Admission Date: 10/29/2022  Hospital Length of Stay: 10 days  Code Status: DNR   Attending Provider: Jefferson Mehta MD  Consulting Provider: Yung Posada MD  Primary Care Physician: JELLY Decker  Principal Problem:Dementia with behavioral disturbance    Patient information was obtained from spouse/SO, relative(s) and primary team.      Assessment/Plan:     Goals of care, counseling/discussion  I met with Mr. Caballero at bedside. He lacks capacity for complex medical decision making. I met with his wife, daughter, and son at bedside.     I updated them on his care to this point. I again shared my concern that his current state (a state of dependence) is likely close to the state he will remain the rest of his life. Based on his values as previously discussed I explained my worry that any further medical procedure or intervention will result in prolonging suffering. I made a recommendation to continue further care with hospice. They understood and agreed.     I answered several questions about hospice. I did specifically address artifical nutrition and tube feedings in his case; I made a strong recommendation against these measures. They understood.    They have contacted potential sitters and are hopeful to pursue hospice at home. SW/CM and primary team aware.    I appreciate being involved. I hope I have been helpful.        I will sign off. Please contact us if you have any additional questions.    Subjective:     Chief Complaint:   Chief Complaint   Patient presents with    Dementia     From [place]        HPI:   Per admission H&P:    "71 yo male with h/o thyroid cancer, frontal temporal lobe dementia, HTN, HLP, depression and anxiety transferred from [facility] inpatient service due to 6 days of failure to thrive. It is reported from the facility that he was PEC/CEC (expires " "11/3/22) due to combative behavior at home. History comes from the patient's spouse and sister-in -law at bedside. Patient follows a neurologist in [place] and spouse reports recent change in medications include increase of zyprexa from 2.5mg to 10mg BID. He was also started on gabapentin TID. Spouse denies recent fever, chills, cough, D/N/V.  Per the MAR from [place] he received Haldol x4 IM, benadryl and ativan IV x2. The facility reports he was not eating or drinking and no witnessed episodes of diarrhea or vomiting. On our workup he has Na153, elevated  AST, hematuria, WBC 17k with 2% bands, lactic acid 1.2. No source of infection on UA or chest xray. On exam he does show discomfort in RUQ and midepigastrium. Abd CT and lipase pending. Troponin mildly elevated 0.056 with some EKG changes. "     Since admission his work up has been largely unrevealing. He remains altered and combative when he is not sleeping. Neurology planning for LP. At this point his prognosis is guarded and potentially quite poor if his mentation does not improve. I have been asked to assist with goals of care.      Hospital Course:  No notes on file    Interval History: No major changes    Past Medical History:   Diagnosis Date    Cancer     Dementia     Depression     Hypertension        Past Surgical History:   Procedure Laterality Date    Lymph node Ca removed         Review of patient's allergies indicates:  No Known Allergies    Medications:  Continuous Infusions:   Amino acid 5% - dextrose 15% (CLINIMIX-E) solution with additives.  CENTRAL LINE ONLY (1395 mOsm/L) 70 mL/hr at 11/08/22 0759    Amino acid 5% - dextrose 15% (CLINIMIX-E) solution with additives.  CENTRAL LINE ONLY (1395 mOsm/L)      lactated ringers 25 mL/hr at 11/08/22 0759     Scheduled Meds:   ampicillin-sulbactim (UNASYN) IVPB  3 g Intravenous Q6H    dantrolene (DANTRIUM) IVPB  1 mg/kg Intravenous Daily    enoxaparin  40 mg Subcutaneous Daily    fat " emulsion 20%  250 mL Intravenous Daily    levothyroxine  44 mcg Intravenous Daily    mupirocin   Nasal BID    scopolamine  1 patch Transdermal Q3 Days    senna-docusate 8.6-50 mg  1 tablet Oral BID    sodium chloride 0.9%  10 mL Intravenous Q6H    valproate sodium (DEPACON) IVPB  250 mg Intravenous Q8H     PRN Meds:acetaminophen, aluminum-magnesium hydroxide-simethicone, bisacodyL, dextrose 10%, dextrose 10%, glucagon (human recombinant), glucose, glucose, labetaloL, lorazepam, naloxone, ondansetron, sodium chloride 0.9%, Flushing PICC Protocol **AND** sodium chloride 0.9% **AND** sodium chloride 0.9%    Family History    None       Tobacco Use    Smoking status: Not on file    Smokeless tobacco: Not on file   Substance and Sexual Activity    Alcohol use: Not on file    Drug use: Not on file    Sexual activity: Not on file       Review of Systems   Unable to perform ROS: Mental status change   Objective:     Vital Signs (Most Recent):  Temp: 98.6 °F (37 °C) (11/08/22 1100)  Pulse: 66 (11/08/22 1100)  Resp: 14 (11/08/22 1100)  BP: (!) 140/70 (11/08/22 1100)  SpO2: 96 % (11/08/22 1100)   Vital Signs (24h Range):  Temp:  [97 °F (36.1 °C)-98.6 °F (37 °C)] 98.6 °F (37 °C)  Pulse:  [58-84] 66  Resp:  [14-20] 14  SpO2:  [96 %-98 %] 96 %  BP: (129-174)/(65-98) 140/70     Weight: 56.7 kg (125 lb)  Body mass index is 19.58 kg/m².    Physical Exam  Constitutional:       General: He is not in acute distress.     Appearance: He is ill-appearing. He is not toxic-appearing.   HENT:      Head: Normocephalic and atraumatic.      Right Ear: External ear normal.      Left Ear: External ear normal.      Mouth/Throat:      Mouth: Mucous membranes are dry.      Pharynx: Oropharynx is clear. No oropharyngeal exudate or posterior oropharyngeal erythema.   Eyes:      General:         Right eye: No discharge.         Left eye: No discharge.      Extraocular Movements: Extraocular movements intact.   Cardiovascular:      Rate and  Rhythm: Normal rate and regular rhythm.   Pulmonary:      Effort: Pulmonary effort is normal. No respiratory distress.      Breath sounds: No wheezing.   Abdominal:      General: There is no distension.      Palpations: Abdomen is soft.   Musculoskeletal:         General: No swelling or tenderness.      Cervical back: Neck supple. No tenderness.   Skin:     General: Skin is warm and dry.   Neurological:      Comments: obtunded       Review of Symptoms      Symptom Assessment (ESAS 0-10 Scale)  Pain:  0  Dyspnea:  0  Anxiety:  0  Nausea:  0  Depression:  0  Anorexia:  0  Fatigue:  0  Insomnia:  0  Restlessness:  0  Agitation:  0  Unable to complete assessment due to Mental status change         Pain Assessment in Advanced Demential Scale (PAINAD)   Breathing - Independent of vocalization:  0  Negative vocalization:  0  Facial expression:  0  Body language:  1  Consolability:  0  Total:  1    Performance Status:  10    Living Arrangements:  Lives with spouse     Time-Based Charting:  Yes  Chart Review: 10 minutes  Face to Face: 60 minutes  Coordination of Care: 5 minutes    Total Time Spent: 75 minutes      Advance Care Planning   Advance Directives:   Do Not Resuscitate Status: Yes      Decision Making:  Family answered questions  Goals of Care: The family endorses that what is most important right now is to focus on comfort and QOL     Accordingly, we have decided that the best plan to meet the patient's goals includes enrolling in hospice care         Significant Labs: BMP:   Recent Labs   Lab 11/07/22  0808   *      K 3.4*      CO2 23   BUN 16   CREATININE 0.7   CALCIUM 8.5*       CBC:   Recent Labs   Lab 11/07/22  0808   WBC 10.73   HGB 11.3*   HCT 33.7*          CBC:   Recent Labs   Lab 11/07/22  0808   WBC 10.73   HGB 11.3*   HCT 33.7*   MCV 91          BMP:  No results for input(s): GLU, NA, K, CL, CO2, BUN, CREATININE, CALCIUM, MG in the last 24 hours.    LFT:  Lab Results    Component Value Date    AST 30 11/03/2022    ALKPHOS 71 11/03/2022    BILITOT 0.7 11/03/2022     Albumin:   Albumin   Date Value Ref Range Status   11/03/2022 2.3 (L) 3.5 - 5.2 g/dL Final     Protein:   Total Protein   Date Value Ref Range Status   11/03/2022 5.4 (L) 6.0 - 8.4 g/dL Final     Lactic acid:   Lab Results   Component Value Date    LACTATE 1.0 11/02/2022    LACTATE 1.2 10/29/2022       Significant Imaging: I have reviewed all pertinent imaging results/findings within the past 24 hours.        Yung Posada MD  Palliative Medicine  [facility]

## 2022-11-08 NOTE — PLAN OF CARE
CM updated by Mariaelena VELZE with palliative that meeting has been completed with palliative and family has decided to go with hospice at home. States they have already selected and reached out to a company on their own. States they would like to use [facility] in [place]. CM sent referral to [facility] via [facility] to follow up with pt's spouse this afternoon       11/08/22 4871   Post-Acute Status   Post-Acute Authorization Hospice   Hospice Status Referrals Sent

## 2022-11-08 NOTE — ASSESSMENT & PLAN NOTE
Working diagnosis in the setting of fever, muscle rigidity, AMS and exposure to multiple anti-psychotics   Infection workup - MSSA pna covered w/ abx but otherwise not yielding etiology  CSF findings overall unremarkable  Dantrolene IV q6 > q8 > q12 > q24 > dc'd - dosing per neurology  Cannot take bromocriptine due to NPO/mental status

## 2022-11-08 NOTE — ASSESSMENT & PLAN NOTE
Sharp change in status on admit from baseline. At this point mostly suspecting NMS on progressive FTD  Little to no improvement noted  CT of abdomen and pelvis - left kidney mass vs complex cyst, non obstructing stone   Head CT and brain MRi reviewed  IVF decreased 2/2 large volume intake overall  Neurology consulted and following  Psychiatry consult placed - patient cannot participate in current state - possible medication recommendation for FTD as to cause NMS in future   No haldol given here  Nightly ativan discontinued now only prn (was taking home xanax)  Restraints continued due to danger to self  Tele monitoring   See fever section for CNS infx rule out  See NMS section as well

## 2022-11-08 NOTE — ASSESSMENT & PLAN NOTE
CXR:  PICC placed in good position.  1.5 cm density projecting over the right mid lung feel likely represents overlapping shadows but follow-up chest x-rays are recommended to assure resolution and absence of a pulmonary lesion  CTA head and neck :  4. Right upper lobe ground-glass opacities in patchy consolidation, nonspecific and may reflect sequelae of small airways inflammation or infection.   UA reflex to culture - NTD  Blood culture- NTD  CSF - negative      Ampicillin, acyclovir, rocephin and vancomycin - dc'd  Resp did deep suction - MSSA covered with Unasyn, vanc dc'd now again, ID rec 10-14 day course  Concern for NMS - see NMS section

## 2022-11-08 NOTE — PLAN OF CARE
Recommendations   1) As family desired continue TPN until pt discharges  D15 AA 5 @ 70 ml/hr + standard lipids   ( provides 84 g protein ( 100% EPN), 1643 kcal ( 100% EEN))     2)PO diet if medically able per care team discretion    Goals: 1) Nutrition support meeting > 75% of needs at f/u  Nutrition Goal Status: met- no longer following  Communication of RD Recs: reviewed with physician (POC, sticky note, second sign)

## 2022-11-08 NOTE — ASSESSMENT & PLAN NOTE
Followed with neurologist in [place] with recent increases in meds PTA - zyprexa 2.5mg to 10mg BID + Prozac and trileptal   Trileptal level <1   See above

## 2022-11-08 NOTE — ASSESSMENT & PLAN NOTE
Spouse decided on DNR   Appreciate palliative care meeting with family - to meet again today  Considering home with hospice vs inpatient hospice  CM following

## 2022-11-09 LAB
ANION GAP SERPL CALC-SCNC: 6 MMOL/L (ref 8–16)
BASOPHILS # BLD AUTO: 0.05 K/UL (ref 0–0.2)
BASOPHILS NFR BLD: 0.7 % (ref 0–1.9)
BUN SERPL-MCNC: 16 MG/DL (ref 8–23)
CALCIUM SERPL-MCNC: 8.3 MG/DL (ref 8.7–10.5)
CHLORIDE SERPL-SCNC: 111 MMOL/L (ref 95–110)
CO2 SERPL-SCNC: 24 MMOL/L (ref 23–29)
CREAT SERPL-MCNC: 0.6 MG/DL (ref 0.5–1.4)
DIFFERENTIAL METHOD: ABNORMAL
EOSINOPHIL # BLD AUTO: 0.2 K/UL (ref 0–0.5)
EOSINOPHIL NFR BLD: 3.2 % (ref 0–8)
ERYTHROCYTE [DISTWIDTH] IN BLOOD BY AUTOMATED COUNT: 12.7 % (ref 11.5–14.5)
EST. GFR  (NO RACE VARIABLE): >60 ML/MIN/1.73 M^2
GLUCOSE SERPL-MCNC: 114 MG/DL (ref 70–110)
HCT VFR BLD AUTO: 31.8 % (ref 40–54)
HGB BLD-MCNC: 10.5 G/DL (ref 14–18)
IMM GRANULOCYTES # BLD AUTO: 0.22 K/UL (ref 0–0.04)
IMM GRANULOCYTES NFR BLD AUTO: 3 % (ref 0–0.5)
LYMPHOCYTES # BLD AUTO: 0.6 K/UL (ref 1–4.8)
LYMPHOCYTES NFR BLD: 8.2 % (ref 18–48)
MCH RBC QN AUTO: 30.1 PG (ref 27–31)
MCHC RBC AUTO-ENTMCNC: 33 G/DL (ref 32–36)
MCV RBC AUTO: 91 FL (ref 82–98)
MONOCYTES # BLD AUTO: 0.9 K/UL (ref 0.3–1)
MONOCYTES NFR BLD: 12.7 % (ref 4–15)
NEUTROPHILS # BLD AUTO: 5.2 K/UL (ref 1.8–7.7)
NEUTROPHILS NFR BLD: 72.2 % (ref 38–73)
NRBC BLD-RTO: 0 /100 WBC
PLATELET # BLD AUTO: 411 K/UL (ref 150–450)
PMV BLD AUTO: 10.8 FL (ref 9.2–12.9)
POTASSIUM SERPL-SCNC: 3.2 MMOL/L (ref 3.5–5.1)
RBC # BLD AUTO: 3.49 M/UL (ref 4.6–6.2)
SODIUM SERPL-SCNC: 141 MMOL/L (ref 136–145)
WBC # BLD AUTO: 7.22 K/UL (ref 3.9–12.7)

## 2022-11-09 PROCEDURE — A4216 STERILE WATER/SALINE, 10 ML: HCPCS | Performed by: HOSPITALIST

## 2022-11-09 PROCEDURE — 99233 SBSQ HOSP IP/OBS HIGH 50: CPT | Mod: ,,, | Performed by: INTERNAL MEDICINE

## 2022-11-09 PROCEDURE — 63600175 PHARM REV CODE 636 W HCPCS: Performed by: HOSPITALIST

## 2022-11-09 PROCEDURE — 99233 PR SUBSEQUENT HOSPITAL CARE,LEVL III: ICD-10-PCS | Mod: ,,, | Performed by: INTERNAL MEDICINE

## 2022-11-09 PROCEDURE — 36415 COLL VENOUS BLD VENIPUNCTURE: CPT | Performed by: STUDENT IN AN ORGANIZED HEALTH CARE EDUCATION/TRAINING PROGRAM

## 2022-11-09 PROCEDURE — 12000002 HC ACUTE/MED SURGE SEMI-PRIVATE ROOM

## 2022-11-09 PROCEDURE — 25000003 PHARM REV CODE 250: Performed by: STUDENT IN AN ORGANIZED HEALTH CARE EDUCATION/TRAINING PROGRAM

## 2022-11-09 PROCEDURE — 63600175 PHARM REV CODE 636 W HCPCS: Performed by: STUDENT IN AN ORGANIZED HEALTH CARE EDUCATION/TRAINING PROGRAM

## 2022-11-09 PROCEDURE — B4185 PARENTERAL SOL 10 GM LIPIDS: HCPCS | Performed by: STUDENT IN AN ORGANIZED HEALTH CARE EDUCATION/TRAINING PROGRAM

## 2022-11-09 PROCEDURE — 80048 BASIC METABOLIC PNL TOTAL CA: CPT | Performed by: STUDENT IN AN ORGANIZED HEALTH CARE EDUCATION/TRAINING PROGRAM

## 2022-11-09 PROCEDURE — 85025 COMPLETE CBC W/AUTO DIFF WBC: CPT | Performed by: STUDENT IN AN ORGANIZED HEALTH CARE EDUCATION/TRAINING PROGRAM

## 2022-11-09 PROCEDURE — 25000003 PHARM REV CODE 250: Performed by: HOSPITALIST

## 2022-11-09 PROCEDURE — 94761 N-INVAS EAR/PLS OXIMETRY MLT: CPT

## 2022-11-09 RX ORDER — MORPHINE SULFATE 10 MG/ML
5 INJECTION INTRAMUSCULAR; INTRAVENOUS; SUBCUTANEOUS
Status: DISCONTINUED | OUTPATIENT
Start: 2022-11-09 | End: 2022-11-10

## 2022-11-09 RX ORDER — MORPHINE SULFATE 2 MG/ML
2 INJECTION, SOLUTION INTRAMUSCULAR; INTRAVENOUS ONCE
Status: COMPLETED | OUTPATIENT
Start: 2022-11-09 | End: 2022-11-09

## 2022-11-09 RX ORDER — MORPHINE SULFATE 2 MG/ML
2 INJECTION, SOLUTION INTRAMUSCULAR; INTRAVENOUS ONCE
Status: DISCONTINUED | OUTPATIENT
Start: 2022-11-09 | End: 2022-11-09

## 2022-11-09 RX ADMIN — DEXTROSE 250 MG: 50 INJECTION, SOLUTION INTRAVENOUS at 11:11

## 2022-11-09 RX ADMIN — SCOPALAMINE 1 PATCH: 1 PATCH, EXTENDED RELEASE TRANSDERMAL at 11:11

## 2022-11-09 RX ADMIN — MUPIROCIN: 20 OINTMENT TOPICAL at 08:11

## 2022-11-09 RX ADMIN — MORPHINE SULFATE 2 MG: 2 INJECTION, SOLUTION INTRAMUSCULAR; INTRAVENOUS at 09:11

## 2022-11-09 RX ADMIN — ENOXAPARIN SODIUM 40 MG: 100 INJECTION SUBCUTANEOUS at 04:11

## 2022-11-09 RX ADMIN — DEXTROSE 250 MG: 50 INJECTION, SOLUTION INTRAVENOUS at 02:11

## 2022-11-09 RX ADMIN — SODIUM CHLORIDE, SODIUM LACTATE, POTASSIUM CHLORIDE, AND CALCIUM CHLORIDE: .6; .31; .03; .02 INJECTION, SOLUTION INTRAVENOUS at 10:11

## 2022-11-09 RX ADMIN — ASCORBIC ACID, VITAMIN A PALMITATE, CHOLECALCIFEROL, THIAMINE HYDROCHLORIDE, RIBOFLAVIN-5 PHOSPHATE SODIUM, PYRIDOXINE HYDROCHLORIDE, NIACINAMIDE, DEXPANTHENOL, ALPHA-TOCOPHEROL ACETATE, VITAMIN K1, FOLIC ACID, BIOTIN, CYANOCOBALAMIN: 200; 3300; 200; 6; 3.6; 6; 40; 15; 10; 150; 600; 60; 5 INJECTION, SOLUTION INTRAVENOUS at 10:11

## 2022-11-09 RX ADMIN — AMPICILLIN SODIUM AND SULBACTAM SODIUM 3 G: 2; 1 INJECTION, POWDER, FOR SOLUTION INTRAMUSCULAR; INTRAVENOUS at 04:11

## 2022-11-09 RX ADMIN — DEXTROSE 250 MG: 50 INJECTION, SOLUTION INTRAVENOUS at 06:11

## 2022-11-09 RX ADMIN — SODIUM CHLORIDE, PRESERVATIVE FREE 10 ML: 5 INJECTION INTRAVENOUS at 11:11

## 2022-11-09 RX ADMIN — LORAZEPAM 0.5 MG: 2 INJECTION INTRAMUSCULAR; INTRAVENOUS at 03:11

## 2022-11-09 RX ADMIN — SOYBEAN OIL 250 ML: 20 INJECTION, SOLUTION INTRAVENOUS at 10:11

## 2022-11-09 RX ADMIN — LEVOTHYROXINE SODIUM 44 MCG: 20 INJECTION, SOLUTION INTRAVENOUS at 09:11

## 2022-11-09 RX ADMIN — MUPIROCIN: 20 OINTMENT TOPICAL at 09:11

## 2022-11-09 NOTE — PROGRESS NOTES
"[facility]  Palliative Medicine  Progress Note    Patient Name: Ronn Caballero Jr.  MRN: [id number]  Admission Date: 10/29/2022  Hospital Length of Stay: 11 days  Code Status: DNR   Attending Provider: Jefferson Mehta MD  Consulting Provider: Yung Posada MD  Primary Care Physician: JELLY Decker  Principal Problem:Dementia with behavioral disturbance    Patient information was obtained from patient, spouse/SO, past medical records and primary team.      Assessment/Plan:     Goals of care, counseling/discussion  I met with Mr. Caballero at bedside. He lacks capacity for complex medical decision making. I met with his wife and sister in law at bedside.    They are content with hospice and working towards discharge with hospice at home. Due to family logistics she is hopeful for discharge on Friday. I find this to be a reasonable plan.     For pain, I would consider the addition of IV morphine 5mg as needed every 1hr. I have placed this order after discussing with his wife.        I will follow-up with patient. Please contact us if you have any additional questions.    Subjective:     Chief Complaint:   Chief Complaint   Patient presents with    Dementia     From [place]        HPI:   Per admission H&P:    "71 yo male with h/o thyroid cancer, frontal temporal lobe dementia, HTN, HLP, depression and anxiety transferred from [place] behavioral inpatient service due to 6 days of failure to thrive. It is reported from the facility that he was PEC/CEC (expires 11/3/22) due to combative behavior at home. History comes from the patient's spouse and sister-in -law at bedside. Patient follows a neurologist in [place] and spouse reports recent change in medications include increase of zyprexa from 2.5mg to 10mg BID. He was also started on gabapentin TID. Spouse denies recent fever, chills, cough, D/N/V.  Per the MAR from [place] he received Haldol x4 IM, benadryl and ativan IV x2. The facility " "reports he was not eating or drinking and no witnessed episodes of diarrhea or vomiting. On our workup he has Na153, elevated  AST, hematuria, WBC 17k with 2% bands, lactic acid 1.2. No source of infection on UA or chest xray. On exam he does show discomfort in RUQ and midepigastrium. Abd CT and lipase pending. Troponin mildly elevated 0.056 with some EKG changes. "     Since admission his work up has been largely unrevealing. He remains altered and combative when he is not sleeping. Neurology planning for LP. At this point his prognosis is guarded and potentially quite poor if his mentation does not improve. I have been asked to assist with goals of care.      Hospital Course:  No notes on file    Interval History: No major changes    Past Medical History:   Diagnosis Date    Cancer     Dementia     Depression     Hypertension        Past Surgical History:   Procedure Laterality Date    Lymph node Ca removed         Review of patient's allergies indicates:  No Known Allergies    Medications:  Continuous Infusions:   Amino acid 5% - dextrose 15% (CLINIMIX-E) solution with additives.  CENTRAL LINE ONLY (1395 mOsm/L) 70 mL/hr at 11/08/22 2231    Amino acid 5% - dextrose 15% (CLINIMIX-E) solution with additives.  CENTRAL LINE ONLY (1395 mOsm/L)      lactated ringers Stopped (11/08/22 1735)     Scheduled Meds:   dantrolene (DANTRIUM) IVPB  1 mg/kg Intravenous Daily    enoxaparin  40 mg Subcutaneous Daily    fat emulsion 20%  250 mL Intravenous Daily    levothyroxine  44 mcg Intravenous Daily    mupirocin   Nasal BID    scopolamine  1 patch Transdermal Q3 Days    senna-docusate 8.6-50 mg  1 tablet Oral BID    sodium chloride 0.9%  10 mL Intravenous Q6H    valproate sodium (DEPACON) IVPB  250 mg Intravenous Q8H     PRN Meds:acetaminophen, aluminum-magnesium hydroxide-simethicone, bisacodyL, dextrose 10%, dextrose 10%, glucagon (human recombinant), glucose, glucose, labetaloL, lorazepam, naloxone, " ondansetron, sodium chloride 0.9%, Flushing PICC Protocol **AND** sodium chloride 0.9% **AND** sodium chloride 0.9%    Family History    None       Tobacco Use    Smoking status: Not on file    Smokeless tobacco: Not on file   Substance and Sexual Activity    Alcohol use: Not on file    Drug use: Not on file    Sexual activity: Not on file       Review of Systems   Unable to perform ROS: Mental status change   Objective:     Vital Signs (Most Recent):  Temp: 98.4 °F (36.9 °C) (11/09/22 0731)  Pulse: 72 (11/09/22 0731)  Resp: 20 (11/09/22 0930)  BP: (!) 176/86 (11/09/22 0731)  SpO2: 97 % (11/09/22 0810)   Vital Signs (24h Range):  Temp:  [96.2 °F (35.7 °C)-98.6 °F (37 °C)] 98.4 °F (36.9 °C)  Pulse:  [50-73] 72  Resp:  [14-20] 20  SpO2:  [95 %-100 %] 97 %  BP: (138-185)/() 176/86     Weight: 56.7 kg (125 lb)  Body mass index is 19.58 kg/m².    Physical Exam  Constitutional:       General: He is not in acute distress.     Appearance: He is ill-appearing. He is not toxic-appearing.   HENT:      Head: Normocephalic and atraumatic.      Right Ear: External ear normal.      Left Ear: External ear normal.      Mouth/Throat:      Mouth: Mucous membranes are dry.      Pharynx: Oropharynx is clear. No oropharyngeal exudate or posterior oropharyngeal erythema.   Eyes:      General:         Right eye: No discharge.         Left eye: No discharge.      Extraocular Movements: Extraocular movements intact.   Cardiovascular:      Rate and Rhythm: Normal rate and regular rhythm.   Pulmonary:      Effort: Pulmonary effort is normal. No respiratory distress.      Breath sounds: No wheezing.   Abdominal:      General: There is no distension.      Palpations: Abdomen is soft.   Musculoskeletal:         General: No swelling or tenderness.      Cervical back: Neck supple. No tenderness.   Skin:     General: Skin is warm and dry.   Neurological:      Comments: obtunded       Review of Symptoms      Symptom Assessment (ESAS 0-10  Scale)  Pain:  0  Dyspnea:  0  Anxiety:  0  Nausea:  0  Depression:  0  Anorexia:  0  Fatigue:  0  Insomnia:  0  Restlessness:  0  Agitation:  0 due to Mental status change         Pain Assessment in Advanced Demential Scale (PAINAD)   Breathing - Independent of vocalization:  0  Negative vocalization:  0  Facial expression:  0  Body language:  1  Consolability:  0  Total:  1    Performance Status:  10    Living Arrangements:  Lives with spouse     Time-Based Charting:  Yes  Chart Review: 10 minutes  Face to Face: 60 minutes  Coordination of Care: 5 minutes    Total Time Spent: 75 minutes      Advance Care Planning   Advance Directives:   Do Not Resuscitate Status: Yes      Decision Making:  Family answered questions  Goals of Care: What is most important right now is to focus on comfort and QOL . Accordingly, we have decided that the best plan to meet the patient's goals includes enrolling in hospice care.       Significant Labs: BMP:   Recent Labs   Lab 11/09/22 0812   *      K 3.2*   *   CO2 24   BUN 16   CREATININE 0.6   CALCIUM 8.3*       CBC:   Recent Labs   Lab 11/09/22 0812   WBC 7.22   HGB 10.5*   HCT 31.8*          CBC:   Recent Labs   Lab 11/09/22 0812   WBC 7.22   HGB 10.5*   HCT 31.8*   MCV 91          BMP:  Recent Labs   Lab 11/09/22 0812   *      K 3.2*   *   CO2 24   BUN 16   CREATININE 0.6   CALCIUM 8.3*       LFT:  Lab Results   Component Value Date    AST 30 11/03/2022    ALKPHOS 71 11/03/2022    BILITOT 0.7 11/03/2022     Albumin:   Albumin   Date Value Ref Range Status   11/03/2022 2.3 (L) 3.5 - 5.2 g/dL Final     Protein:   Total Protein   Date Value Ref Range Status   11/03/2022 5.4 (L) 6.0 - 8.4 g/dL Final     Lactic acid:   Lab Results   Component Value Date    LACTATE 1.0 11/02/2022    LACTATE 1.2 10/29/2022       Significant Imaging: I have reviewed all pertinent imaging results/findings within the past 24 hours.        Yung HAGEN  MD [place]  Palliative Medicine  [facility]

## 2022-11-09 NOTE — SUBJECTIVE & OBJECTIVE
Interval History: Patient seen and examined. MERRILL Continues in bed with restraints unable to participate in interview. Discussed with wife. Family decided to take patient home with hospice once this can be arranged.    Review of Systems   Unable to perform ROS: Mental status change   Objective:     Vital Signs (Most Recent):  Temp: 98.6 °F (37 °C) (11/09/22 1126)  Pulse: 61 (11/09/22 1126)  Resp: 17 (11/09/22 1126)  BP: 130/68 (11/09/22 1126)  SpO2: 97 % (11/09/22 1126) Vital Signs (24h Range):  Temp:  [96.2 °F (35.7 °C)-98.6 °F (37 °C)] 98.6 °F (37 °C)  Pulse:  [50-73] 61  Resp:  [17-20] 17  SpO2:  [95 %-100 %] 97 %  BP: (130-185)/() 130/68     Weight: 56.7 kg (125 lb)  Body mass index is 19.58 kg/m².    Intake/Output Summary (Last 24 hours) at 11/9/2022 1504  Last data filed at 11/9/2022 1415  Gross per 24 hour   Intake 2617.94 ml   Output 3650 ml   Net -1032.06 ml      Physical Exam  Constitutional:       General: He is not in acute distress.     Appearance: He is ill-appearing.      Comments: In restraints   HENT:      Head: Normocephalic and atraumatic.      Mouth/Throat:      Mouth: Mucous membranes are dry.   Cardiovascular:      Rate and Rhythm: Normal rate and regular rhythm.   Pulmonary:      Effort: Pulmonary effort is normal. No respiratory distress.      Breath sounds: No wheezing.   Abdominal:      General: Abdomen is flat. Bowel sounds are normal. There is no distension.      Palpations: Abdomen is soft.      Tenderness: There is no abdominal tenderness. There is no guarding or rebound.   Musculoskeletal:         General: No swelling or tenderness.   Skin:     General: Skin is warm and dry.   Neurological:      GCS: GCS eye subscore is 1. GCS verbal subscore is 2. GCS motor subscore is 5.      Comments: Obtunded  Not able to cooperate  Making incomprehensible noises  Overall increased tone in musculature       Significant Labs: All pertinent labs within the past 24 hours have been  reviewed.    Significant Imaging: I have reviewed all pertinent imaging results/findings within the past 24 hours.

## 2022-11-09 NOTE — ASSESSMENT & PLAN NOTE
Appreciate palliative care meeting with family   Family has decided DNR and will take home with hospice once arranged  CM following

## 2022-11-09 NOTE — ASSESSMENT & PLAN NOTE
Not taking PO  TPN for nutrition   Progression of dementia  Palliative care discussion - DNR and to go home with hospice

## 2022-11-09 NOTE — ASSESSMENT & PLAN NOTE
CXR:  PICC placed in good position.  1.5 cm density projecting over the right mid lung feel likely represents overlapping shadows but follow-up chest x-rays are recommended to assure resolution and absence of a pulmonary lesion  CTA head and neck :  4. Right upper lobe ground-glass opacities in patchy consolidation, nonspecific and may reflect sequelae of small airways inflammation or infection.   UA reflex to culture - NTD  Blood culture- NTD  CSF - negative      Ampicillin, acyclovir, rocephin and vancomycin - dc'd  Resp did deep suction - MSSA covered with Unasyn, vanc dc'd now again; all abx dc'd now completed 10 day course with combo  Concern for NMS - see NMS section  ID consult - signed off now

## 2022-11-09 NOTE — SUBJECTIVE & OBJECTIVE
Interval History: No major changes    Past Medical History:   Diagnosis Date    Cancer     Dementia     Depression     Hypertension        Past Surgical History:   Procedure Laterality Date    Lymph node Ca removed         Review of patient's allergies indicates:  No Known Allergies    Medications:  Continuous Infusions:   Amino acid 5% - dextrose 15% (CLINIMIX-E) solution with additives.  CENTRAL LINE ONLY (1395 mOsm/L) 70 mL/hr at 11/08/22 2231    Amino acid 5% - dextrose 15% (CLINIMIX-E) solution with additives.  CENTRAL LINE ONLY (1395 mOsm/L)      lactated ringers Stopped (11/08/22 1735)     Scheduled Meds:   dantrolene (DANTRIUM) IVPB  1 mg/kg Intravenous Daily    enoxaparin  40 mg Subcutaneous Daily    fat emulsion 20%  250 mL Intravenous Daily    levothyroxine  44 mcg Intravenous Daily    mupirocin   Nasal BID    scopolamine  1 patch Transdermal Q3 Days    senna-docusate 8.6-50 mg  1 tablet Oral BID    sodium chloride 0.9%  10 mL Intravenous Q6H    valproate sodium (DEPACON) IVPB  250 mg Intravenous Q8H     PRN Meds:acetaminophen, aluminum-magnesium hydroxide-simethicone, bisacodyL, dextrose 10%, dextrose 10%, glucagon (human recombinant), glucose, glucose, labetaloL, lorazepam, naloxone, ondansetron, sodium chloride 0.9%, Flushing PICC Protocol **AND** sodium chloride 0.9% **AND** sodium chloride 0.9%    Family History    None       Tobacco Use    Smoking status: Not on file    Smokeless tobacco: Not on file   Substance and Sexual Activity    Alcohol use: Not on file    Drug use: Not on file    Sexual activity: Not on file       Review of Systems   Unable to perform ROS: Mental status change   Objective:     Vital Signs (Most Recent):  Temp: 98.4 °F (36.9 °C) (11/09/22 0731)  Pulse: 72 (11/09/22 0731)  Resp: 20 (11/09/22 0930)  BP: (!) 176/86 (11/09/22 0731)  SpO2: 97 % (11/09/22 0810)   Vital Signs (24h Range):  Temp:  [96.2 °F (35.7 °C)-98.6 °F (37 °C)] 98.4 °F (36.9 °C)  Pulse:  [50-73] 72  Resp:   [14-20] 20  SpO2:  [95 %-100 %] 97 %  BP: (138-185)/() 176/86     Weight: 56.7 kg (125 lb)  Body mass index is 19.58 kg/m².    Physical Exam  Constitutional:       General: He is not in acute distress.     Appearance: He is ill-appearing. He is not toxic-appearing.   HENT:      Head: Normocephalic and atraumatic.      Right Ear: External ear normal.      Left Ear: External ear normal.      Mouth/Throat:      Mouth: Mucous membranes are dry.      Pharynx: Oropharynx is clear. No oropharyngeal exudate or posterior oropharyngeal erythema.   Eyes:      General:         Right eye: No discharge.         Left eye: No discharge.      Extraocular Movements: Extraocular movements intact.   Cardiovascular:      Rate and Rhythm: Normal rate and regular rhythm.   Pulmonary:      Effort: Pulmonary effort is normal. No respiratory distress.      Breath sounds: No wheezing.   Abdominal:      General: There is no distension.      Palpations: Abdomen is soft.   Musculoskeletal:         General: No swelling or tenderness.      Cervical back: Neck supple. No tenderness.   Skin:     General: Skin is warm and dry.   Neurological:      Comments: obtunded       Review of Symptoms      Symptom Assessment (ESAS 0-10 Scale)  Pain:  0  Dyspnea:  0  Anxiety:  0  Nausea:  0  Depression:  0  Anorexia:  0  Fatigue:  0  Insomnia:  0  Restlessness:  0  Agitation:  0 due to Mental status change         Pain Assessment in Advanced Demential Scale (PAINAD)   Breathing - Independent of vocalization:  0  Negative vocalization:  0  Facial expression:  0  Body language:  1  Consolability:  0  Total:  1    Performance Status:  10    Living Arrangements:  Lives with spouse     Time-Based Charting:  Yes  Chart Review: 10 minutes  Face to Face: 60 minutes  Coordination of Care: 5 minutes    Total Time Spent: 75 minutes      Advance Care Planning   Advance Directives:   Do Not Resuscitate Status: Yes      Decision Making:  Family answered questions  Goals  of Care: What is most important right now is to focus on comfort and QOL . Accordingly, we have decided that the best plan to meet the patient's goals includes enrolling in hospice care.       Significant Labs: BMP:   Recent Labs   Lab 11/09/22 0812   *      K 3.2*   *   CO2 24   BUN 16   CREATININE 0.6   CALCIUM 8.3*       CBC:   Recent Labs   Lab 11/09/22 0812   WBC 7.22   HGB 10.5*   HCT 31.8*          CBC:   Recent Labs   Lab 11/09/22 0812   WBC 7.22   HGB 10.5*   HCT 31.8*   MCV 91          BMP:  Recent Labs   Lab 11/09/22 0812   *      K 3.2*   *   CO2 24   BUN 16   CREATININE 0.6   CALCIUM 8.3*       LFT:  Lab Results   Component Value Date    AST 30 11/03/2022    ALKPHOS 71 11/03/2022    BILITOT 0.7 11/03/2022     Albumin:   Albumin   Date Value Ref Range Status   11/03/2022 2.3 (L) 3.5 - 5.2 g/dL Final     Protein:   Total Protein   Date Value Ref Range Status   11/03/2022 5.4 (L) 6.0 - 8.4 g/dL Final     Lactic acid:   Lab Results   Component Value Date    LACTATE 1.0 11/02/2022    LACTATE 1.2 10/29/2022       Significant Imaging: I have reviewed all pertinent imaging results/findings within the past 24 hours.

## 2022-11-09 NOTE — PLAN OF CARE
11/08/22 2017   Patient Assessment/Suction   Level of Consciousness (AVPU) alert   Respiratory Effort Normal;Unlabored   Expansion/Accessory Muscles/Retractions expansion symmetric   All Lung Fields Breath Sounds coarse   Rhythm/Pattern, Respiratory pattern regular   Cough Frequency no cough   $ Suction Charges   (NT suction not required at this time)   PRE-TX-O2   O2 Device (Oxygen Therapy) room air   SpO2 100 %   Pulse Oximetry Type Intermittent

## 2022-11-09 NOTE — PROGRESS NOTES
[facility] Medicine  Progress Note    Patient Name: Ronn Caballero Jr.  MRN: [id number]  Patient Class: IP- Inpatient   Admission Date: 10/29/2022  Length of Stay: 11 days  Attending Physician: Jefferson Mehta MD  Primary Care Provider: JELLY Decker        Subjective:     Principal Problem:Dementia with behavioral disturbance        HPI:  73 yo male with h/o thyroid cancer, frontal temporal lobe dementia, HTN, HLP, depression and anxiety transferred from [facility] due to 6 days of failure to thrive. It is reported from the facility that he was PEC/CEC (expires 11/3/22) due to combative behavior at home. History comes from the patient's spouse and sister-in -law at bedside. Patient follows a neurologist in [place] and spouse reports recent change in medications include increase of zyprexa from 2.5mg to 10mg BID. He was also started on gabapentin TID. Spouse denies recent fever, chills, cough, D/N/V.  Per the MAR from [place] he received Haldol x4 IM, benadryl and ativan IV x2. The facility reports he was not eating or drinking and no witnessed episodes of diarrhea or vomiting. On our workup he has Na153, elevated  AST, hematuria, WBC 17k with 2% bands, lactic acid 1.2. No source of infection on UA or chest xray. On exam he does show discomfort in RUQ and midepigastrium. Abd CT and lipase pending. Troponin mildly elevated 0.056 with some EKG changes.     Hospital medicine consulted for further medical management. Physical restraints will be used to protect patient as he is pulling at cardiac leads and peripheral IV. Neurology consulted. Needs tele sitter.       Overview/Hospital Course:  Patient admitted from [facility] facility with failure to thrive and progressive psychosis. He has h/o frontal temporal dementia. Overnight he was given IV ativan and has no improvement in behavior. Patient unable to participate in a telepsych visit.  Neurology was consulted on guidance to medications. Appears he was given a combination of meds in addition to his home medication and possibly polypharmacy resulted in psychosis. He does require restraints as he is a harm to himself. Will try to resume home medications if he can follow commands to swallow pills. Electrolyte replacement by IV. IVF for hypernatremia and elevated CPK >3000. Monitoring on tele due to QT prolongation. The spouse and daughter at bedside and agree to palliative care discussion in regards to goals of care and advanced care planning.     Fevers persistent - started on amp, rocephin, vanc and acyclovir for concern of CNS infection. LP completed and studies pending. Brain MRI: poor study due to motion artifact, generalized volume loss. Zyprexa dc'd, concern for NMS. Ativan IV Ordered. Restraints remain in place. Picc line placed to E. TPN ordered for nutrition. Unable to take oral meds and diet due to mental status. CK trending down 1700. Na 144.     Meningitis antibiotics dc'd, ativan weaning, TPN infusing, CXR did not show infiltrates, continues to spike fevers.  Psychiatry consult placed, though not able to do if patient cannot communicate on camera. Resp culture pending, unasyn IV should cover. Continuing dantrolene. With drop in O2 sats on RA, place on supplemental O2. CK trending down. LA normal.     PEC/CEC dc'd/. Fevers resolved. Resp culture + staph - MSSA. ID, neurology and psychiatry following. Depakote IV for mood stabilization. Prefer to not try anti-psychotics and SSRi in setting of possible NMS. Palliative care had meeting with patient/family made DNR.     Overall impression is progression of already moderate to severe dementia with new insult from NMS. Continues obtunded not following commands. TPN and restraints continued. To continue with supportive care and re-evaluate goals of care. Dantrolene weaned off. Ativan scheduled dc'd but to be used prn. Palliative to  re-visit.    Family has decided to take patient home with hospice. CM helping arrange this. Morphine and ativan prn for comfort. Abx discontinued (10 day course completed with combo of abx). Labs discontinued.      Interval History: Patient seen and examined. SCHUYLER. Continues in bed with restraints unable to participate in interview. Discussed with wife. Family decided to take patient home with hospice once this can be arranged.    Review of Systems   Unable to perform ROS: Mental status change   Objective:     Vital Signs (Most Recent):  Temp: 98.6 °F (37 °C) (11/09/22 1126)  Pulse: 61 (11/09/22 1126)  Resp: 17 (11/09/22 1126)  BP: 130/68 (11/09/22 1126)  SpO2: 97 % (11/09/22 1126) Vital Signs (24h Range):  Temp:  [96.2 °F (35.7 °C)-98.6 °F (37 °C)] 98.6 °F (37 °C)  Pulse:  [50-73] 61  Resp:  [17-20] 17  SpO2:  [95 %-100 %] 97 %  BP: (130-185)/() 130/68     Weight: 56.7 kg (125 lb)  Body mass index is 19.58 kg/m².    Intake/Output Summary (Last 24 hours) at 11/9/2022 1504  Last data filed at 11/9/2022 1415  Gross per 24 hour   Intake 2617.94 ml   Output 3650 ml   Net -1032.06 ml      Physical Exam  Constitutional:       General: He is not in acute distress.     Appearance: He is ill-appearing.      Comments: In restraints   HENT:      Head: Normocephalic and atraumatic.      Mouth/Throat:      Mouth: Mucous membranes are dry.   Cardiovascular:      Rate and Rhythm: Normal rate and regular rhythm.   Pulmonary:      Effort: Pulmonary effort is normal. No respiratory distress.      Breath sounds: No wheezing.   Abdominal:      General: Abdomen is flat. Bowel sounds are normal. There is no distension.      Palpations: Abdomen is soft.      Tenderness: There is no abdominal tenderness. There is no guarding or rebound.   Musculoskeletal:         General: No swelling or tenderness.   Skin:     General: Skin is warm and dry.   Neurological:      GCS: GCS eye subscore is 1. GCS verbal subscore is 2. GCS motor subscore  is 5.      Comments: Obtunded  Not able to cooperate  Making incomprehensible noises  Overall increased tone in musculature       Significant Labs: All pertinent labs within the past 24 hours have been reviewed.    Significant Imaging: I have reviewed all pertinent imaging results/findings within the past 24 hours.      Assessment/Plan:      * Dementia with behavioral disturbance  Sharp change in status on admit from baseline. At this point mostly suspecting NMS on progressive FTD  Little to no improvement noted  CT of abdomen and pelvis - left kidney mass vs complex cyst, non obstructing stone   Head CT and brain MRi reviewed  IVF decreased 2/2 large volume intake overall  Neurology consulted and following  Psychiatry consult placed - patient cannot participate in current state - possible medication recommendation for FTD as to cause NMS in future   No haldol given here  Nightly ativan discontinued now only prn (was taking home xanax)  Morphine prn added for comfort  Restraints continued due to danger to self  Tele monitoring   See fever section for CNS infx rule out  See NMS section as well    Frontotemporal dementia  Followed with neurologist in [place] with recent increases in meds PTA - zyprexa 2.5mg to 10mg BID + Prozac and trileptal   Trileptal level <1   See above    Fever  CXR:  PICC placed in good position.  1.5 cm density projecting over the right mid lung feel likely represents overlapping shadows but follow-up chest x-rays are recommended to assure resolution and absence of a pulmonary lesion  CTA head and neck :  4. Right upper lobe ground-glass opacities in patchy consolidation, nonspecific and may reflect sequelae of small airways inflammation or infection.   UA reflex to culture - NTD  Blood culture- NTD  CSF - negative      Ampicillin, acyclovir, rocephin and vancomycin - dc'd  Resp did deep suction - MSSA covered with Unasyn, vanc dc'd now again; all abx dc'd now completed 10 day course with  combo  Concern for NMS - see NMS section  ID consult - signed off now    NMS (neuroleptic malignant syndrome)  Working diagnosis in the setting of fever, muscle rigidity, AMS and exposure to multiple anti-psychotics   Infection workup - MSSA pna covered w/ completed abx but otherwise not yielding etiology  CSF findings overall unremarkable  Dantrolene IV q6 > q8 > q12 > q24 > dc'd - dosing per neurology  Cannot take bromocriptine due to NPO/mental status     Non-traumatic rhabdomyolysis  resolved    Moderate malnutrition  Nutrition consulted. Most recent weight and BMI monitored-      Malnutrition (Moderate to Severe)  Energy Intake (Malnutrition): less than 75% for greater than or equal to 1 month    Final Summary  Subcutaneous Fat Loss (Final Summary): mild protein-calorie malnutrition  Muscle Loss Evaluation (Final Summary): mild protein-calorie malnutrition         Measurements:  Wt Readings from Last 1 Encounters:   11/01/22 56.7 kg (125 lb)   Body mass index is 19.58 kg/m².    Recommendations: Recommendation/Intervention: 1) Start TPN via PICC when placed: D15 AA 5 @ 70 ml/hr + standard lipids ( provides 84 g protein ( 100% EPN), 1643 kcal ( 100% EEN)) 2) If pt remains NPO and NGT placement become possible or PEG needed longterm rec TF: Peptamen 1.5 Prebio @ 20 ml/hr advancing to goal of 45 ml/hr + 130 ml flush q 4 hr  ( provides 1620 kcal ( 100% EEN), 73 g protein ( 100% EPN),and 831 ml free water) 3) If AMS improves and able to advance diet rec. regular, texure per SLP to encourage PO intakes 4) weigh weekly  Goals: 1) Nutrition support meeting > 75% of needs at f/u    Patient has been screened and assessed by RD. RD will follow patient.    TPN until discharge to hospice    Goals of care, counseling/discussion  Appreciate palliative care meeting with family   Family has decided DNR and will take home with hospice once arranged  CM following    Bandemia  See fever section    Postoperative  hypothyroidism  Resume synthroid - IV due to NPo status   FT4 - normal     Hypernatremia  Resolved with IVF    Failure to thrive in adult  Not taking PO  TPN for nutrition   Progression of dementia  Palliative care discussion - DNR and to go home with hospice    essential hypertension  Chronic, controlled  Not taking po  IV labetalol PRN       VTE Risk Mitigation (From admission, onward)         Ordered     enoxaparin injection 40 mg  Daily         10/29/22 1513     IP VTE HIGH RISK PATIENT  Once         10/29/22 1513     Place sequential compression device  Until discontinued         10/29/22 1513                Discharge Planning   TAYLOR: 11/10/2022     Code Status: DNR   Is the patient medically ready for discharge?:     Reason for patient still in hospital (select all that apply): Pending disposition - arranging hospice  Discharge Plan A: home w/ hospice       Jefferson Mehta MD  Department of Hospital Medicine   [facility]

## 2022-11-09 NOTE — ASSESSMENT & PLAN NOTE
Nutrition consulted. Most recent weight and BMI monitored-      Malnutrition (Moderate to Severe)  Energy Intake (Malnutrition): less than 75% for greater than or equal to 1 month    Final Summary  Subcutaneous Fat Loss (Final Summary): mild protein-calorie malnutrition  Muscle Loss Evaluation (Final Summary): mild protein-calorie malnutrition         Measurements:  Wt Readings from Last 1 Encounters:   11/01/22 56.7 kg (125 lb)   Body mass index is 19.58 kg/m².    Recommendations: Recommendation/Intervention: 1) Start TPN via PICC when placed: D15 AA 5 @ 70 ml/hr + standard lipids ( provides 84 g protein ( 100% EPN), 1643 kcal ( 100% EEN)) 2) If pt remains NPO and NGT placement become possible or PEG needed longterm rec TF: Peptamen 1.5 Prebio @ 20 ml/hr advancing to goal of 45 ml/hr + 130 ml flush q 4 hr  ( provides 1620 kcal ( 100% EEN), 73 g protein ( 100% EPN),and 831 ml free water) 3) If AMS improves and able to advance diet rec. regular, texure per SLP to encourage PO intakes 4) weigh weekly  Goals: 1) Nutrition support meeting > 75% of needs at f/u    Patient has been screened and assessed by RD. RD will follow patient.    TPN until discharge to hospice

## 2022-11-09 NOTE — PLAN OF CARE
Patient progressing towards discharge.    Patient had no acute events noted. Patient remains confused and not oriented, but does say words from time to time.  This is an improvement from last week which was incomprehensible speech.  Patient continues to be agitated and pulls at restraints.  Soft upper extremity restraints remain in place.  Avasys camera and bed alarm in place.  Patient repositions self constantly in bed.  IV fluids and nutrition given as ordered.  Ativan given earlier in shift for agitation.  Discussed POC with patient and family with verbalization of understanding.    Will continue to monitor.

## 2022-11-09 NOTE — ASSESSMENT & PLAN NOTE
Sharp change in status on admit from baseline. At this point mostly suspecting NMS on progressive FTD  Little to no improvement noted  CT of abdomen and pelvis - left kidney mass vs complex cyst, non obstructing stone   Head CT and brain MRi reviewed  IVF decreased 2/2 large volume intake overall  Neurology consulted and following  Psychiatry consult placed - patient cannot participate in current state - possible medication recommendation for FTD as to cause NMS in future   No haldol given here  Nightly ativan discontinued now only prn (was taking home xanax)  Morphine prn added for comfort  Restraints continued due to danger to self  Tele monitoring   See fever section for CNS infx rule out  See NMS section as well

## 2022-11-09 NOTE — ASSESSMENT & PLAN NOTE
Followed with neurologist in [place] with recent increases in meds PTA - zyprexa 2.5mg to 10mg BID + Prozac and trileptal   Trileptal level <1   See above

## 2022-11-09 NOTE — ASSESSMENT & PLAN NOTE
Working diagnosis in the setting of fever, muscle rigidity, AMS and exposure to multiple anti-psychotics   Infection workup - MSSA pna covered w/ completed abx but otherwise not yielding etiology  CSF findings overall unremarkable  Dantrolene IV q6 > q8 > q12 > q24 > dc'd - dosing per neurology  Cannot take bromocriptine due to NPO/mental status

## 2022-11-09 NOTE — ASSESSMENT & PLAN NOTE
I met with Mr. Caballero at bedside. He lacks capacity for complex medical decision making. I met with his wife and sister in law at bedside.    They are content with hospice and working towards discharge with hospice at home. Due to family logistics she is hopeful for discharge on Friday. I find this to be a reasonable plan.     For pain, I would consider the addition of IV morphine 5mg as needed every 1hr. I have placed this order after discussing with his wife.

## 2022-11-09 NOTE — PLAN OF CARE
CM spoke with pt's spouse. States she arranged meeting with hospice for this afternoon. States her son in law is going to be the one attending the meeting and arranging delivery for equip. States the earliest everything can be arranged due to her and family's schedule is Friday AM. Pt will require ambulance transportation. Plan for DC on Friday 11/09/22 1200   Post-Acute Status   Post-Acute Authorization Hospice   Hospice Status Pending education

## 2022-11-10 PROCEDURE — 25000003 PHARM REV CODE 250: Performed by: STUDENT IN AN ORGANIZED HEALTH CARE EDUCATION/TRAINING PROGRAM

## 2022-11-10 PROCEDURE — 12000002 HC ACUTE/MED SURGE SEMI-PRIVATE ROOM

## 2022-11-10 PROCEDURE — 94760 N-INVAS EAR/PLS OXIMETRY 1: CPT

## 2022-11-10 PROCEDURE — 25000003 PHARM REV CODE 250: Performed by: HOSPITALIST

## 2022-11-10 PROCEDURE — 63600175 PHARM REV CODE 636 W HCPCS: Performed by: STUDENT IN AN ORGANIZED HEALTH CARE EDUCATION/TRAINING PROGRAM

## 2022-11-10 PROCEDURE — 94761 N-INVAS EAR/PLS OXIMETRY MLT: CPT

## 2022-11-10 PROCEDURE — B4185 PARENTERAL SOL 10 GM LIPIDS: HCPCS | Performed by: STUDENT IN AN ORGANIZED HEALTH CARE EDUCATION/TRAINING PROGRAM

## 2022-11-10 RX ORDER — MORPHINE SULFATE 10 MG/ML
5 INJECTION INTRAMUSCULAR; INTRAVENOUS; SUBCUTANEOUS EVERY 4 HOURS PRN
Status: DISCONTINUED | OUTPATIENT
Start: 2022-11-10 | End: 2022-11-11 | Stop reason: HOSPADM

## 2022-11-10 RX ADMIN — MORPHINE SULFATE 5 MG: 10 INJECTION, SOLUTION INTRAMUSCULAR; INTRAVENOUS at 10:11

## 2022-11-10 RX ADMIN — DEXTROSE 250 MG: 50 INJECTION, SOLUTION INTRAVENOUS at 10:11

## 2022-11-10 RX ADMIN — LORAZEPAM 0.5 MG: 2 INJECTION INTRAMUSCULAR; INTRAVENOUS at 03:11

## 2022-11-10 RX ADMIN — DEXTROSE 250 MG: 50 INJECTION, SOLUTION INTRAVENOUS at 06:11

## 2022-11-10 RX ADMIN — SOYBEAN OIL 250 ML: 20 INJECTION, SOLUTION INTRAVENOUS at 10:11

## 2022-11-10 RX ADMIN — DEXTROSE 250 MG: 50 INJECTION, SOLUTION INTRAVENOUS at 02:11

## 2022-11-10 RX ADMIN — ASCORBIC ACID, VITAMIN A PALMITATE, CHOLECALCIFEROL, THIAMINE HYDROCHLORIDE, RIBOFLAVIN-5 PHOSPHATE SODIUM, PYRIDOXINE HYDROCHLORIDE, NIACINAMIDE, DEXPANTHENOL, ALPHA-TOCOPHEROL ACETATE, VITAMIN K1, FOLIC ACID, BIOTIN, CYANOCOBALAMIN: 200; 3300; 200; 6; 3.6; 6; 40; 15; 10; 150; 600; 60; 5 INJECTION, SOLUTION INTRAVENOUS at 10:11

## 2022-11-10 RX ADMIN — LORAZEPAM 0.5 MG: 2 INJECTION INTRAMUSCULAR; INTRAVENOUS at 02:11

## 2022-11-10 RX ADMIN — LEVOTHYROXINE SODIUM 44 MCG: 20 INJECTION, SOLUTION INTRAVENOUS at 08:11

## 2022-11-10 NOTE — ASSESSMENT & PLAN NOTE
Working diagnosis in the setting of fever, muscle rigidity, AMS and exposure to multiple anti-psychotics   Infection workup - MSSA pna covered w/ completed abx but otherwise not yielding etiology  CSF findings overall unremarkable  Dantrolene IV q6 > q8 > q12 > q24 > dc'd - dosed per neurology  Cannot take bromocriptine due to NPO/mental status

## 2022-11-10 NOTE — CARE UPDATE
11/10/22 1142   Patient Assessment/Suction   Level of Consciousness (AVPU) responds to pain   Respiratory Effort Unlabored   $ Suction Charges   (not required at this time)   PRE-TX-O2   O2 Device (Oxygen Therapy) room air   SpO2 99 %   Pulse Oximetry Type Intermittent   $ Pulse Oximetry - Multiple Charge Pulse Oximetry - Multiple

## 2022-11-10 NOTE — PROGRESS NOTES
[facility] Medicine  Progress Note    Patient Name: Ronn Caballero Jr.  MRN: [id number]  Patient Class: IP- Inpatient   Admission Date: 10/29/2022  Length of Stay: 12 days  Attending Physician: Jefferson Mehta MD  Primary Care Provider: JELLY Decker        Subjective:     Principal Problem:Dementia with behavioral disturbance        HPI:  73 yo male with h/o thyroid cancer, frontal temporal lobe dementia, HTN, HLP, depression and anxiety transferred from [facility] due to 6 days of failure to thrive. It is reported from the facility that he was PEC/CEC (expires 11/3/22) due to combative behavior at home. History comes from the patient's spouse and sister-in -law at bedside. Patient follows a neurologist in [place] and spouse reports recent change in medications include increase of zyprexa from 2.5mg to 10mg BID. He was also started on gabapentin TID. Spouse denies recent fever, chills, cough, D/N/V.  Per the MAR from [place] he received Haldol x4 IM, benadryl and ativan IV x2. The facility reports he was not eating or drinking and no witnessed episodes of diarrhea or vomiting. On our workup he has Na153, elevated  AST, hematuria, WBC 17k with 2% bands, lactic acid 1.2. No source of infection on UA or chest xray. On exam he does show discomfort in RUQ and midepigastrium. Abd CT and lipase pending. Troponin mildly elevated 0.056 with some EKG changes.     Hospital medicine consulted for further medical management. Physical restraints will be used to protect patient as he is pulling at cardiac leads and peripheral IV. Neurology consulted. Needs tele sitter.       Overview/Hospital Course:  Patient admitted from [facility] facility with failure to thrive and progressive psychosis. He has h/o frontal temporal dementia. Overnight he was given IV ativan and has no improvement in behavior. Patient unable to participate in a telepsych visit.  Neurology was consulted on guidance to medications. Appears he was given a combination of meds in addition to his home medication and possibly polypharmacy resulted in psychosis. He does require restraints as he is a harm to himself. Will try to resume home medications if he can follow commands to swallow pills. Electrolyte replacement by IV. IVF for hypernatremia and elevated CPK >3000. Monitoring on tele due to QT prolongation. The spouse and daughter at bedside and agree to palliative care discussion in regards to goals of care and advanced care planning.     Fevers persistent - started on amp, rocephin, vanc and acyclovir for concern of CNS infection. LP completed and studies pending. Brain MRI: poor study due to motion artifact, generalized volume loss. Zyprexa dc'd, concern for NMS. Ativan IV Ordered. Restraints remain in place. Picc line placed to E. TPN ordered for nutrition. Unable to take oral meds and diet due to mental status. CK trending down 1700. Na 144.     Meningitis antibiotics dc'd, ativan weaning, TPN infusing, CXR did not show infiltrates, continues to spike fevers.  Psychiatry consult placed, though not able to do if patient cannot communicate on camera. Resp culture pending, unasyn IV should cover. Continuing dantrolene. With drop in O2 sats on RA, place on supplemental O2. CK trending down. LA normal.     PEC/CEC dc'd/. Fevers resolved. Resp culture + staph - MSSA. ID, neurology and psychiatry following. Depakote IV for mood stabilization. Prefer to not try anti-psychotics and SSRi in setting of possible NMS. Palliative care had meeting with patient/family made DNR.     Overall impression is progression of already moderate to severe dementia with new insult from NMS. Continues obtunded not following commands. TPN and restraints continued. To continue with supportive care and re-evaluate goals of care. Dantrolene weaned off. Ativan scheduled dc'd but to be used prn. Palliative to  re-visit.    Family has decided to take patient home with hospice. CM helping arrange this. Morphine and ativan prn for comfort. Abx discontinued (10 day course completed with combo of abx). Labs discontinued.      Interval History: Patient seen and examined. Continued with some intermittent agitation overnight. Hospice being arranged expect discharge tomorrow.    Review of Systems   Unable to perform ROS: Mental status change   Objective:     Vital Signs (Most Recent):  Temp: 96.4 °F (35.8 °C) (11/10/22 0715)  Pulse: 78 (11/10/22 0715)  Resp: 16 (11/10/22 0715)  BP: (!) 133/90 (11/10/22 0715)  SpO2: 98 % (11/10/22 0715)   Vital Signs (24h Range):  Temp:  [96.4 °F (35.8 °C)-99 °F (37.2 °C)] 96.4 °F (35.8 °C)  Pulse:  [57-87] 78  Resp:  [16-18] 16  SpO2:  [93 %-100 %] 98 %  BP: (130-176)/() 133/90     Weight: 56.7 kg (125 lb)  Body mass index is 19.58 kg/m².    Intake/Output Summary (Last 24 hours) at 11/10/2022 1011  Last data filed at 11/10/2022 0431  Gross per 24 hour   Intake 2046.52 ml   Output 1750 ml   Net 296.52 ml      Physical Exam  Constitutional:       General: He is not in acute distress.     Appearance: He is ill-appearing.      Comments: In restraints   HENT:      Head: Normocephalic and atraumatic.      Mouth/Throat:      Mouth: Mucous membranes are dry.   Cardiovascular:      Rate and Rhythm: Normal rate and regular rhythm.   Pulmonary:      Effort: Pulmonary effort is normal. No respiratory distress.      Breath sounds: No wheezing.   Abdominal:      General: Abdomen is flat. Bowel sounds are normal. There is no distension.      Palpations: Abdomen is soft.      Tenderness: There is no abdominal tenderness. There is no guarding or rebound.   Musculoskeletal:         General: No swelling or tenderness.   Skin:     General: Skin is warm and dry.   Neurological:      GCS: GCS eye subscore is 1. GCS verbal subscore is 2. GCS motor subscore is 5.      Comments: Obtunded  Not able to  cooperate  Making incomprehensible noises  Overall increased tone in musculature       Significant Labs: All pertinent labs within the past 24 hours have been reviewed.    Significant Imaging: I have reviewed all pertinent imaging results/findings within the past 24 hours.      Assessment/Plan:      * Dementia with behavioral disturbance  Sharp change in status on admit from baseline. At this point mostly suspecting NMS on progressive FTD  Little to no improvement noted  CT of abdomen and pelvis - left kidney mass vs complex cyst, non obstructing stone   Head CT and brain MRi reviewed  IVF decreased 2/2 large volume intake overall  Neurology consulted and following  Psychiatry consulted - patient cannot participate in current state - possible medication recommendation for FTD as to cause NMS in future   No haldol given here  Nightly ativan discontinued now only prn (was taking home xanax)  Morphine prn added for comfort  Restraints continued due to danger to self  Tele monitoring   See fever section for CNS infx rule out  See NMS section as well    Frontotemporal dementia  Followed with neurologist in [place] with recent increases in meds PTA - zyprexa 2.5mg to 10mg BID + Prozac and trileptal   Trileptal level <1   See above    Fever  CXR:  PICC placed in good position.  1.5 cm density projecting over the right mid lung feel likely represents overlapping shadows but follow-up chest x-rays are recommended to assure resolution and absence of a pulmonary lesion  CTA head and neck :  4. Right upper lobe ground-glass opacities in patchy consolidation, nonspecific and may reflect sequelae of small airways inflammation or infection.   UA reflex to culture - NTD  Blood culture- NTD  CSF - negative      Ampicillin, acyclovir, rocephin and vancomycin - dc'd  Resp did deep suction - MSSA covered with Unasyn, vanc dc'd now again; all abx dc'd now completed 10 day course with combo  Concern for NMS - see NMS section  ID  consult - signed off now    NMS (neuroleptic malignant syndrome)  Working diagnosis in the setting of fever, muscle rigidity, AMS and exposure to multiple anti-psychotics   Infection workup - MSSA pna covered w/ completed abx but otherwise not yielding etiology  CSF findings overall unremarkable  Dantrolene IV q6 > q8 > q12 > q24 > dc'd - dosed per neurology  Cannot take bromocriptine due to NPO/mental status     Non-traumatic rhabdomyolysis  resolved    Moderate malnutrition  Nutrition consulted. Most recent weight and BMI monitored-      Malnutrition (Moderate to Severe)  Energy Intake (Malnutrition): less than 75% for greater than or equal to 1 month    Final Summary  Subcutaneous Fat Loss (Final Summary): mild protein-calorie malnutrition  Muscle Loss Evaluation (Final Summary): mild protein-calorie malnutrition         Measurements:  Wt Readings from Last 1 Encounters:   11/01/22 56.7 kg (125 lb)   Body mass index is 19.58 kg/m².    Recommendations: Recommendation/Intervention: 1) Start TPN via PICC when placed: D15 AA 5 @ 70 ml/hr + standard lipids ( provides 84 g protein ( 100% EPN), 1643 kcal ( 100% EEN)) 2) If pt remains NPO and NGT placement become possible or PEG needed longterm rec TF: Peptamen 1.5 Prebio @ 20 ml/hr advancing to goal of 45 ml/hr + 130 ml flush q 4 hr  ( provides 1620 kcal ( 100% EEN), 73 g protein ( 100% EPN),and 831 ml free water) 3) If AMS improves and able to advance diet rec. regular, texure per SLP to encourage PO intakes 4) weigh weekly  Goals: 1) Nutrition support meeting > 75% of needs at f/u    Patient has been screened and assessed by RD. RD will follow patient.    TPN until discharge to hospice    Goals of care, counseling/discussion  Appreciate palliative care meeting with family   Family has decided DNR and will take home with hospice once arranged  CM following    Bandemia  See fever section    Postoperative hypothyroidism  Resume synthroid - IV due to NPo status   FT4 -  normal     Hypernatremia  Resolved with IVF    Failure to thrive in adult  Not taking PO  TPN for nutrition   Progression of dementia  Palliative care discussion - DNR and to go home with hospice    essential hypertension  Chronic, controlled  Not taking po  IV labetalol PRN       VTE Risk Mitigation (From admission, onward)         Ordered     enoxaparin injection 40 mg  Daily         10/29/22 1513     IP VTE HIGH RISK PATIENT  Once         10/29/22 1513     Place sequential compression device  Until discontinued         10/29/22 1513                Discharge Planning   TAYLOR: 11/11/2022     Code Status: DNR   Is the patient medically ready for discharge?:     Reason for patient still in hospital (select all that apply): Pending disposition - awaiting hospice arrangements/equipment   Discharge Plan A: hospice       Jefferson Mehta MD  Department of Hospital Medicine   [facility]

## 2022-11-10 NOTE — SUBJECTIVE & OBJECTIVE
Interval History: Patient seen and examined. Continued with some intermittent agitation overnight. Hospice being arranged expect discharge tomorrow.    Review of Systems   Unable to perform ROS: Mental status change   Objective:     Vital Signs (Most Recent):  Temp: 96.4 °F (35.8 °C) (11/10/22 0715)  Pulse: 78 (11/10/22 0715)  Resp: 16 (11/10/22 0715)  BP: (!) 133/90 (11/10/22 0715)  SpO2: 98 % (11/10/22 0715)   Vital Signs (24h Range):  Temp:  [96.4 °F (35.8 °C)-99 °F (37.2 °C)] 96.4 °F (35.8 °C)  Pulse:  [57-87] 78  Resp:  [16-18] 16  SpO2:  [93 %-100 %] 98 %  BP: (130-176)/() 133/90     Weight: 56.7 kg (125 lb)  Body mass index is 19.58 kg/m².    Intake/Output Summary (Last 24 hours) at 11/10/2022 1011  Last data filed at 11/10/2022 0431  Gross per 24 hour   Intake 2046.52 ml   Output 1750 ml   Net 296.52 ml      Physical Exam  Constitutional:       General: He is not in acute distress.     Appearance: He is ill-appearing.      Comments: In restraints   HENT:      Head: Normocephalic and atraumatic.      Mouth/Throat:      Mouth: Mucous membranes are dry.   Cardiovascular:      Rate and Rhythm: Normal rate and regular rhythm.   Pulmonary:      Effort: Pulmonary effort is normal. No respiratory distress.      Breath sounds: No wheezing.   Abdominal:      General: Abdomen is flat. Bowel sounds are normal. There is no distension.      Palpations: Abdomen is soft.      Tenderness: There is no abdominal tenderness. There is no guarding or rebound.   Musculoskeletal:         General: No swelling or tenderness.   Skin:     General: Skin is warm and dry.   Neurological:      GCS: GCS eye subscore is 1. GCS verbal subscore is 2. GCS motor subscore is 5.      Comments: Obtunded  Not able to cooperate  Making incomprehensible noises  Overall increased tone in musculature       Significant Labs: All pertinent labs within the past 24 hours have been reviewed.    Significant Imaging: I have reviewed all pertinent imaging  results/findings within the past 24 hours.

## 2022-11-10 NOTE — PLAN OF CARE
Patient progressing towards discharge.    Patient had no acute events noted. Patient remains agitated and unable to follow commands.  A few episodes of incontinence when condom catheter came off overnight.  Patient turns self and assisted in repositioning overnight.  IV fluids, TPN and lipids infusing as ordered overnight.  Discussed POC with patient and family with verbalization of understanding.    Will continue to monitor.

## 2022-11-10 NOTE — ASSESSMENT & PLAN NOTE
Followed with neurologist in [place] with recent increases in meds PTA - zyprexa 2.5mg to 10mg BID + Prozac and trileptal   Trileptal level <1   See above

## 2022-11-10 NOTE — ASSESSMENT & PLAN NOTE
Sharp change in status on admit from baseline. At this point mostly suspecting NMS on progressive FTD  Little to no improvement noted  CT of abdomen and pelvis - left kidney mass vs complex cyst, non obstructing stone   Head CT and brain MRi reviewed  IVF decreased 2/2 large volume intake overall  Neurology consulted and following  Psychiatry consulted - patient cannot participate in current state - possible medication recommendation for FTD as to cause NMS in future   No haldol given here  Nightly ativan discontinued now only prn (was taking home xanax)  Morphine prn added for comfort  Restraints continued due to danger to self  Tele monitoring   See fever section for CNS infx rule out  See NMS section as well

## 2022-11-11 VITALS
OXYGEN SATURATION: 94 % | TEMPERATURE: 97 F | SYSTOLIC BLOOD PRESSURE: 133 MMHG | RESPIRATION RATE: 18 BRPM | BODY MASS INDEX: 19.62 KG/M2 | HEIGHT: 67 IN | HEART RATE: 78 BPM | WEIGHT: 125 LBS | DIASTOLIC BLOOD PRESSURE: 74 MMHG

## 2022-11-11 PROCEDURE — 63600175 PHARM REV CODE 636 W HCPCS: Performed by: STUDENT IN AN ORGANIZED HEALTH CARE EDUCATION/TRAINING PROGRAM

## 2022-11-11 PROCEDURE — 25000003 PHARM REV CODE 250: Performed by: STUDENT IN AN ORGANIZED HEALTH CARE EDUCATION/TRAINING PROGRAM

## 2022-11-11 PROCEDURE — 25000003 PHARM REV CODE 250: Performed by: HOSPITALIST

## 2022-11-11 PROCEDURE — A4216 STERILE WATER/SALINE, 10 ML: HCPCS | Performed by: HOSPITALIST

## 2022-11-11 PROCEDURE — 95819 EEG AWAKE AND ASLEEP: CPT

## 2022-11-11 PROCEDURE — 99233 PR SUBSEQUENT HOSPITAL CARE,LEVL III: ICD-10-PCS | Mod: ,,, | Performed by: INTERNAL MEDICINE

## 2022-11-11 PROCEDURE — 99233 SBSQ HOSP IP/OBS HIGH 50: CPT | Mod: ,,, | Performed by: INTERNAL MEDICINE

## 2022-11-11 PROCEDURE — 94761 N-INVAS EAR/PLS OXIMETRY MLT: CPT

## 2022-11-11 RX ORDER — LORAZEPAM 2 MG/ML
1 INJECTION INTRAMUSCULAR ONCE AS NEEDED
Status: COMPLETED | OUTPATIENT
Start: 2022-11-11 | End: 2022-11-11

## 2022-11-11 RX ADMIN — SODIUM CHLORIDE, PRESERVATIVE FREE 10 ML: 5 INJECTION INTRAVENOUS at 12:11

## 2022-11-11 RX ADMIN — LORAZEPAM 1 MG: 2 INJECTION INTRAMUSCULAR; INTRAVENOUS at 11:11

## 2022-11-11 RX ADMIN — DEXTROSE 250 MG: 50 INJECTION, SOLUTION INTRAVENOUS at 02:11

## 2022-11-11 RX ADMIN — MORPHINE SULFATE 5 MG: 10 INJECTION, SOLUTION INTRAMUSCULAR; INTRAVENOUS at 10:11

## 2022-11-11 RX ADMIN — DEXTROSE 250 MG: 50 INJECTION, SOLUTION INTRAVENOUS at 11:11

## 2022-11-11 RX ADMIN — MORPHINE SULFATE 5 MG: 10 INJECTION, SOLUTION INTRAMUSCULAR; INTRAVENOUS at 03:11

## 2022-11-11 RX ADMIN — LEVOTHYROXINE SODIUM 44 MCG: 20 INJECTION, SOLUTION INTRAVENOUS at 10:11

## 2022-11-11 NOTE — ASSESSMENT & PLAN NOTE
I met with Mr. Caballero at bedside. He lacks capacity for complex medical decision making. I met with his wife and sister in law at bedside.    They are content with hospice and working towards discharge with hospice at home. They are ready for discharge today.    Ms. Caballero expressed appropriate emotion today. I took time to attend this emotion and provide reassurance.    Prior to discharge would suggest a dose of morphine and ativan given his prolonged ride home.    Please call with questions.

## 2022-11-11 NOTE — PLAN OF CARE
Pt clear for DC from case management standpoint. Discharging to home with [facility]      ADT 30 placed for ambulance transportation. Requested  time: 12 PM    When pt is leaving nursing to notify pt's spouse as well as Hospice company. (Hospice # [phone])     11/11/22 1132   Final Note   Assessment Type Final Discharge Note   Anticipated Discharge Disposition [facility]

## 2022-11-11 NOTE — PLAN OF CARE
Left message for Mariana with [facility] requesting call back to see when equip is being delivered       11/11/22 1022   Post-Acute Status   Post-Acute Authorization Hospice   Hospice Status Pending equipment/medication delivery

## 2022-11-11 NOTE — DISCHARGE SUMMARY
[facility] Medicine  Discharge Summary      Patient Name: Ronn Caballero Jr.  MRN: [id number]  KELVIN: [id number]  Patient Class: IP- Inpatient  Admission Date: 10/29/2022  Hospital Length of Stay: 13 days  Discharge Date and Time:  11/11/2022 11:02 AM  Attending Physician: Jefferson Mehta MD   Discharging Provider: Jefferson Mehta MD  Primary Care Provider: JELLY Decker    Primary Care Team: Networked reference to record PCT     HPI:   71 yo male with h/o thyroid cancer, frontal temporal lobe dementia, HTN, HLP, depression and anxiety transferred from [facility] due to 6 days of failure to thrive. It is reported from the facility that he was PEC/CEC (expires 11/3/22) due to combative behavior at home. History comes from the patient's spouse and sister-in -law at bedside. Patient follows a neurologist in [place] and spouse reports recent change in medications include increase of zyprexa from 2.5mg to 10mg BID. He was also started on gabapentin TID. Spouse denies recent fever, chills, cough, D/N/V.  Per the MAR from [place] he received Haldol x4 IM, benadryl and ativan IV x2. The facility reports he was not eating or drinking and no witnessed episodes of diarrhea or vomiting. On our workup he has Na153, elevated  AST, hematuria, WBC 17k with 2% bands, lactic acid 1.2. No source of infection on UA or chest xray. On exam he does show discomfort in RUQ and midepigastrium. Abd CT and lipase pending. Troponin mildly elevated 0.056 with some EKG changes.     Hospital medicine consulted for further medical management. Physical restraints will be used to protect patient as he is pulling at cardiac leads and peripheral IV. Neurology consulted. Needs tele sitter.       * No surgery found *      Hospital Course:   Patient admitted from [facility] facility with failure to thrive and progressive psychosis. He has h/o frontal temporal dementia. Overnight he  was given IV ativan and has no improvement in behavior. Patient unable to participate in a telepsych visit. Neurology was consulted on guidance to medications. Appears he was given a combination of meds in addition to his home medication and possibly polypharmacy resulted in psychosis. He does require restraints as he is a harm to himself. Will try to resume home medications if he can follow commands to swallow pills. Electrolyte replacement by IV. IVF for hypernatremia and elevated CPK >3000. Monitoring on tele due to QT prolongation. The spouse and daughter at bedside and agree to palliative care discussion in regards to goals of care and advanced care planning.     Fevers persistent - started on amp, rocephin, vanc and acyclovir for concern of CNS infection. LP completed and studies pending. Brain MRI: poor study due to motion artifact, generalized volume loss. Zyprexa dc'd, concern for NMS. Ativan IV Ordered. Restraints remain in place. Picc line placed to [facility]. TPN ordered for nutrition. Unable to take oral meds and diet due to mental status. CK trending down 1700. Na 144.     Meningitis antibiotics dc'd, ativan weaning, TPN infusing, CXR did not show infiltrates, continues to spike fevers.  Psychiatry consult placed, though not able to do if patient cannot communicate on camera. Resp culture pending, unasyn IV should cover. Continuing dantrolene. With drop in O2 sats on RA, place on supplemental O2. CK trending down. LA normal.     PEC/CEC dc'd/. Fevers resolved. Resp culture + staph - MSSA. ID, neurology and psychiatry following. Depakote IV for mood stabilization. Prefer to not try anti-psychotics and SSRi in setting of possible NMS. Palliative care had meeting with patient/family made DNR.     Overall impression is progression of already moderate to severe dementia with new insult from NMS. Continues obtunded not following commands. TPN and restraints continued. To continue with supportive care and  re-evaluate goals of care. Dantrolene weaned off. Ativan scheduled dc'd but to be used prn. Palliative to re-visit.    Family has decided to take patient home with hospice. CM helped arrange this. Morphine and ativan prn for comfort. Abx discontinued (10 day course completed with combo of abx). Labs discontinued.      Physical Exam on day of discharge:  Constitutional:       General: He is not in acute distress.     Appearance: He is ill-appearing.      Comments: In restraints   HENT:      Head: Normocephalic and atraumatic.      Mouth/Throat:      Mouth: Mucous membranes are dry.   Cardiovascular:      Rate and Rhythm: Normal rate and regular rhythm.   Pulmonary:      Effort: Pulmonary effort is normal. No respiratory distress.      Breath sounds: No wheezing.   Abdominal:      General: Abdomen is flat. Bowel sounds are normal. There is no distension.      Palpations: Abdomen is soft.      Tenderness: There is no abdominal tenderness. There is no guarding or rebound.   Musculoskeletal:         General: No swelling or tenderness.   Skin:     General: Skin is warm and dry.   Neurological:      GCS: GCS eye subscore is 1. GCS verbal subscore is 2. GCS motor subscore is 5.      Comments: Obtunded  Not able to cooperate  Making incomprehensible noises  Overall increased tone in musculature          Goals of Care Treatment Preferences:  Code Status: DNR          What is most important right now is to focus on comfort and QOL .  Accordingly, we have decided that the best plan to meet the patient's goals includes enrolling in hospice care.      Consults:   Consults (From admission, onward)        Status Ordering Provider     Inpatient consult to Social Work/Case Management  Once        Provider:  (Not yet assigned)    Completed SUSAN CASILLAS     Inpatient consult to Social Work/Case Management  Once        Provider:  (Not yet assigned)    Acknowledged SUSAN CASILLAS     Inpatient consult to Psychiatry  Once         Provider:  Nayana Yo MD    Completed MATHEW GALEANO     Inpatient consult to Registered Dietitian/Nutritionist  Once        Provider:  (Not yet assigned)    Completed MATHEW GALEANO     Inpatient consult to PICC team (\[facility] [place] [facility]\"")  Once        Provider:  (Not yet assigned)    Completed MATHEW GALEANO     Inpatient consult to Registered Dietitian/Nutritionist  Once        Provider:  (Not yet assigned)    Completed MATHEW GALEANO     Inpatient consult to Infectious Diseases  Once        Provider:  Radha Almanza MD    Completed MATHEW GALEANO     Inpatient consult to Palliative Care  Once        Provider:  Mariaelena Joyner RN    Completed MATHEW GALEANO     Inpatient consult to Neurology  Once        Provider:  Grecia Garay NP    Completed MATHEW GALEANO          No new Assessment & Plan notes have been filed under this hospital service since the last note was generated.  Service: Hospital Medicine    Final Active Diagnoses:    Diagnosis Date Noted POA    PRINCIPAL PROBLEM:  Dementia with behavioral disturbance [F03.918] 10/29/2022 Yes    Frontotemporal dementia [G31.09, F02.80] 10/29/2022 Yes    Fever [R50.9] 10/31/2022 No    NMS (neuroleptic malignant syndrome) [G21.0] 11/03/2022 Yes    Non-traumatic rhabdomyolysis [M62.82] 11/06/2022 Yes    Moderate malnutrition [E44.0] 11/01/2022 Yes    Goals of care, counseling/discussion [Z71.89] 10/31/2022 Not Applicable    essential hypertension [I10] 10/29/2022 Yes    Failure to thrive in adult [R62.7] 10/29/2022 Yes    Hypernatremia [E87.0] 10/29/2022 Yes    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] 10/29/2022 Yes    Bandemia [D72.825] 10/29/2022 Yes      Problems Resolved During this Admission:       Discharged Condition: poor    Disposition: Hospice/Home    Patient Instructions:      Diet Adult Regular     Activity as tolerated       Significant Diagnostic Studies:     Recent Results (from the past 336 hour(s))   Urinalysis,  Reflex to Urine Culture Urine, Clean Catch    Collection Time: 10/29/22 11:19 AM    Specimen: Urine   Result Value Ref Range    Specimen UA Urine, Catheterized     Color, UA Yellow Yellow, Straw, April    Appearance, UA Clear Clear    pH, UA 6.0 5.0 - 8.0    Specific Gravity, UA >=1.030 (A) 1.005 - 1.030    Protein, UA 1+ (A) Negative    Glucose, UA Negative Negative    Ketones, UA 1+ (A) Negative    Bilirubin (UA) 1+ (A) Negative    Occult Blood UA 3+ (A) Negative    Nitrite, UA Negative Negative    Urobilinogen, UA 2.0-3.0 (A) <2.0 EU/dL    Leukocytes, UA Negative Negative   Urinalysis Microscopic    Collection Time: 10/29/22 11:19 AM   Result Value Ref Range    RBC, UA 8 (H) 0 - 4 /hpf    WBC, UA 2 0 - 5 /hpf    Bacteria Occasional None-Occ /hpf    Hyaline Casts, UA 0 0-1/lpf /lpf    Microscopic Comment SEE COMMENT    CBC Auto Differential    Collection Time: 10/29/22 11:21 AM   Result Value Ref Range    WBC 17.42 (H) 3.90 - 12.70 K/uL    RBC 4.44 (L) 4.60 - 6.20 M/uL    Hemoglobin 13.7 (L) 14.0 - 18.0 g/dL    Hematocrit 41.9 40.0 - 54.0 %    MCV 94 82 - 98 fL    MCH 30.9 27.0 - 31.0 pg    MCHC 32.7 32.0 - 36.0 g/dL    RDW 13.0 11.5 - 14.5 %    Platelets 274 150 - 450 K/uL    MPV 10.6 9.2 - 12.9 fL    Immature Granulocytes CANCELED 0.0 - 0.5 %    Immature Grans (Abs) CANCELED 0.00 - 0.04 K/uL    Lymph # CANCELED 1.0 - 4.8 K/uL    Mono # CANCELED 0.3 - 1.0 K/uL    Eos # CANCELED 0.0 - 0.5 K/uL    Baso # CANCELED 0.00 - 0.20 K/uL    nRBC 0 0 /100 WBC    Gran % 85.0 (H) 38.0 - 73.0 %    Lymph % 3.0 (L) 18.0 - 48.0 %    Mono % 10.0 4.0 - 15.0 %    Eosinophil % 0.0 0.0 - 8.0 %    Basophil % 0.0 0.0 - 1.9 %    Bands 2.0 %    Platelet Estimate Appears normal     Differential Method Manual    Comprehensive Metabolic Panel    Collection Time: 10/29/22 11:21 AM   Result Value Ref Range    Sodium 153 (H) 136 - 145 mmol/L    Potassium 4.3 3.5 - 5.1 mmol/L    Chloride 115 (H) 95 - 110 mmol/L    CO2 24 23 - 29 mmol/L     Glucose 107 70 - 110 mg/dL    BUN 42 (H) 8 - 23 mg/dL    Creatinine 1.2 0.5 - 1.4 mg/dL    Calcium 10.7 (H) 8.7 - 10.5 mg/dL    Total Protein 7.2 6.0 - 8.4 g/dL    Albumin 3.9 3.5 - 5.2 g/dL    Total Bilirubin 0.9 0.1 - 1.0 mg/dL    Alkaline Phosphatase 102 55 - 135 U/L     (H) 10 - 40 U/L    ALT 42 10 - 44 U/L    Anion Gap 14 8 - 16 mmol/L    eGFR >60 >60 mL/min/1.73 m^2   Oxcarbazepine Level    Collection Time: 10/29/22 12:32 PM   Result Value Ref Range    Oxcarbazepine 5 (L) 10 - 35 mcg/mL   Troponin I    Collection Time: 10/29/22 12:32 PM   Result Value Ref Range    Troponin I 0.056 (H) 0.000 - 0.026 ng/mL   Lactic Acid, Plasma    Collection Time: 10/29/22  2:23 PM   Result Value Ref Range    Lactate (Lactic Acid) 1.2 0.5 - 2.2 mmol/L   Blood culture (site 1)    Collection Time: 10/29/22  3:42 PM    Specimen: Blood   Result Value Ref Range    Blood Culture, Routine No growth after 5 days.    Procalcitonin    Collection Time: 10/29/22  3:43 PM   Result Value Ref Range    Procalcitonin 0.31 (H) <0.25 ng/mL   CK    Collection Time: 10/29/22  3:43 PM   Result Value Ref Range    CPK 3221 (H) 20 - 200 U/L   T4, free    Collection Time: 10/29/22  3:43 PM   Result Value Ref Range    Free T4 0.80 0.71 - 1.51 ng/dL   Lipase    Collection Time: 10/29/22  3:43 PM   Result Value Ref Range    Lipase 7 4 - 60 U/L   Blood culture (site 2)    Collection Time: 10/29/22  3:43 PM    Specimen: Blood   Result Value Ref Range    Blood Culture, Routine No growth after 5 days.    Troponin I    Collection Time: 10/29/22  4:56 PM   Result Value Ref Range    Troponin I 0.067 (H) 0.000 - 0.026 ng/mL   Troponin I    Collection Time: 10/29/22  9:12 PM   Result Value Ref Range    Troponin I 0.078 (H) 0.000 - 0.026 ng/mL   Comprehensive Metabolic Panel (CMP)    Collection Time: 10/30/22  4:28 AM   Result Value Ref Range    Sodium 150 (H) 136 - 145 mmol/L    Potassium 3.3 (L) 3.5 - 5.1 mmol/L    Chloride 120 (H) 95 - 110 mmol/L    CO2 22  (L) 23 - 29 mmol/L    Glucose 112 (H) 70 - 110 mg/dL    BUN 30 (H) 8 - 23 mg/dL    Creatinine 0.9 0.5 - 1.4 mg/dL    Calcium 9.0 8.7 - 10.5 mg/dL    Total Protein 5.6 (L) 6.0 - 8.4 g/dL    Albumin 2.9 (L) 3.5 - 5.2 g/dL    Total Bilirubin 0.7 0.1 - 1.0 mg/dL    Alkaline Phosphatase 83 55 - 135 U/L     (H) 10 - 40 U/L    ALT 44 10 - 44 U/L    Anion Gap 8 8 - 16 mmol/L    eGFR >60 >60 mL/min/1.73 m^2   Magnesium    Collection Time: 10/30/22  4:28 AM   Result Value Ref Range    Magnesium 2.1 1.6 - 2.6 mg/dL   Phosphorus    Collection Time: 10/30/22  4:28 AM   Result Value Ref Range    Phosphorus 2.0 (L) 2.7 - 4.5 mg/dL   CBC with Automated Differential    Collection Time: 10/30/22  4:28 AM   Result Value Ref Range    WBC 9.24 3.90 - 12.70 K/uL    RBC 3.61 (L) 4.60 - 6.20 M/uL    Hemoglobin 11.0 (L) 14.0 - 18.0 g/dL    Hematocrit 33.4 (L) 40.0 - 54.0 %    MCV 93 82 - 98 fL    MCH 30.5 27.0 - 31.0 pg    MCHC 32.9 32.0 - 36.0 g/dL    RDW 12.9 11.5 - 14.5 %    Platelets 217 150 - 450 K/uL    MPV 10.8 9.2 - 12.9 fL    Immature Granulocytes 0.3 0.0 - 0.5 %    Gran # (ANC) 6.8 1.8 - 7.7 K/uL    Immature Grans (Abs) 0.03 0.00 - 0.04 K/uL    Lymph # 0.7 (L) 1.0 - 4.8 K/uL    Mono # 1.5 (H) 0.3 - 1.0 K/uL    Eos # 0.1 0.0 - 0.5 K/uL    Baso # 0.05 0.00 - 0.20 K/uL    nRBC 0 0 /100 WBC    Gran % 73.9 (H) 38.0 - 73.0 %    Lymph % 7.9 (L) 18.0 - 48.0 %    Mono % 16.2 (H) 4.0 - 15.0 %    Eosinophil % 1.2 0.0 - 8.0 %    Basophil % 0.5 0.0 - 1.9 %    Differential Method Automated    Oxcarbazepine level    Collection Time: 10/30/22  2:30 PM   Result Value Ref Range    Oxcarbazepine <1 (L) 10 - 35 mcg/mL   Vitamin B12    Collection Time: 10/30/22  2:30 PM   Result Value Ref Range    Vitamin B-12 1119 (H) 210 - 950 pg/mL   Vitamin B1    Collection Time: 10/30/22  2:30 PM   Result Value Ref Range    Thiamine 58 38 - 122 ug/L   Comprehensive Metabolic Panel (CMP)    Collection Time: 10/31/22  4:40 AM   Result Value Ref Range     Sodium 149 (H) 136 - 145 mmol/L    Potassium 3.2 (L) 3.5 - 5.1 mmol/L    Chloride 120 (H) 95 - 110 mmol/L    CO2 22 (L) 23 - 29 mmol/L    Glucose 105 70 - 110 mg/dL    BUN 19 8 - 23 mg/dL    Creatinine 0.8 0.5 - 1.4 mg/dL    Calcium 9.1 8.7 - 10.5 mg/dL    Total Protein 5.5 (L) 6.0 - 8.4 g/dL    Albumin 2.8 (L) 3.5 - 5.2 g/dL    Total Bilirubin 0.7 0.1 - 1.0 mg/dL    Alkaline Phosphatase 85 55 - 135 U/L    AST 89 (H) 10 - 40 U/L    ALT 48 (H) 10 - 44 U/L    Anion Gap 7 (L) 8 - 16 mmol/L    eGFR >60 >60 mL/min/1.73 m^2   Magnesium    Collection Time: 10/31/22  4:40 AM   Result Value Ref Range    Magnesium 2.0 1.6 - 2.6 mg/dL   Phosphorus    Collection Time: 10/31/22  4:40 AM   Result Value Ref Range    Phosphorus 2.5 (L) 2.7 - 4.5 mg/dL   CBC with Automated Differential    Collection Time: 10/31/22  4:40 AM   Result Value Ref Range    WBC 7.73 3.90 - 12.70 K/uL    RBC 3.74 (L) 4.60 - 6.20 M/uL    Hemoglobin 11.5 (L) 14.0 - 18.0 g/dL    Hematocrit 34.4 (L) 40.0 - 54.0 %    MCV 92 82 - 98 fL    MCH 30.7 27.0 - 31.0 pg    MCHC 33.4 32.0 - 36.0 g/dL    RDW 12.8 11.5 - 14.5 %    Platelets 221 150 - 450 K/uL    MPV 11.1 9.2 - 12.9 fL    Immature Granulocytes 0.4 0.0 - 0.5 %    Gran # (ANC) 5.4 1.8 - 7.7 K/uL    Immature Grans (Abs) 0.03 0.00 - 0.04 K/uL    Lymph # 0.8 (L) 1.0 - 4.8 K/uL    Mono # 1.3 (H) 0.3 - 1.0 K/uL    Eos # 0.2 0.0 - 0.5 K/uL    Baso # 0.04 0.00 - 0.20 K/uL    nRBC 0 0 /100 WBC    Gran % 69.2 38.0 - 73.0 %    Lymph % 10.6 (L) 18.0 - 48.0 %    Mono % 16.8 (H) 4.0 - 15.0 %    Eosinophil % 2.5 0.0 - 8.0 %    Basophil % 0.5 0.0 - 1.9 %    Differential Method Automated    CK    Collection Time: 10/31/22  4:40 AM   Result Value Ref Range    CPK 1766 (H) 20 - 200 U/L   Urinalysis, Reflex to Urine Culture Urine, Clean Catch    Collection Time: 10/31/22  4:10 PM    Specimen: Urine   Result Value Ref Range    Specimen UA Urine, Clean Catch     Color, UA Yellow Yellow, Straw, April    Appearance, UA Clear Clear     pH, UA 6.0 5.0 - 8.0    Specific Gravity, UA 1.010 1.005 - 1.030    Protein, UA Negative Negative    Glucose, UA Negative Negative    Ketones, UA Negative Negative    Bilirubin (UA) Negative Negative    Occult Blood UA Trace (A) Negative    Nitrite, UA Negative Negative    Urobilinogen, UA Negative <2.0 EU/dL    Leukocytes, UA Negative Negative   COVID-19 Routine Screening    Collection Time: 10/31/22  4:10 PM   Result Value Ref Range    SARS-CoV2 (COVID-19) Qualitative PCR Not Detected Not Detected   SARS-COV-2- Cycle Number    Collection Time: 10/31/22  4:10 PM   Result Value Ref Range    SARS-COV-2- Cycle Number N/A    Comprehensive Metabolic Panel (CMP)    Collection Time: 11/01/22  4:47 AM   Result Value Ref Range    Sodium 144 136 - 145 mmol/L    Potassium 2.9 (L) 3.5 - 5.1 mmol/L    Chloride 112 (H) 95 - 110 mmol/L    CO2 22 (L) 23 - 29 mmol/L    Glucose 121 (H) 70 - 110 mg/dL    BUN 14 8 - 23 mg/dL    Creatinine 0.9 0.5 - 1.4 mg/dL    Calcium 8.7 8.7 - 10.5 mg/dL    Total Protein 5.6 (L) 6.0 - 8.4 g/dL    Albumin 2.6 (L) 3.5 - 5.2 g/dL    Total Bilirubin 0.7 0.1 - 1.0 mg/dL    Alkaline Phosphatase 79 55 - 135 U/L    AST 56 (H) 10 - 40 U/L    ALT 42 10 - 44 U/L    Anion Gap 10 8 - 16 mmol/L    eGFR >60 >60 mL/min/1.73 m^2   Magnesium    Collection Time: 11/01/22  4:47 AM   Result Value Ref Range    Magnesium 1.8 1.6 - 2.6 mg/dL   Phosphorus    Collection Time: 11/01/22  4:47 AM   Result Value Ref Range    Phosphorus 1.7 (L) 2.7 - 4.5 mg/dL   CBC with Automated Differential    Collection Time: 11/01/22  4:47 AM   Result Value Ref Range    WBC 9.89 3.90 - 12.70 K/uL    RBC 3.66 (L) 4.60 - 6.20 M/uL    Hemoglobin 11.3 (L) 14.0 - 18.0 g/dL    Hematocrit 33.7 (L) 40.0 - 54.0 %    MCV 92 82 - 98 fL    MCH 30.9 27.0 - 31.0 pg    MCHC 33.5 32.0 - 36.0 g/dL    RDW 12.6 11.5 - 14.5 %    Platelets 224 150 - 450 K/uL    MPV 11.1 9.2 - 12.9 fL    Immature Granulocytes 0.3 0.0 - 0.5 %    Gran # (ANC) 7.5 1.8 - 7.7 K/uL     Immature Grans (Abs) 0.03 0.00 - 0.04 K/uL    Lymph # 0.8 (L) 1.0 - 4.8 K/uL    Mono # 1.4 (H) 0.3 - 1.0 K/uL    Eos # 0.2 0.0 - 0.5 K/uL    Baso # 0.06 0.00 - 0.20 K/uL    nRBC 0 0 /100 WBC    Gran % 75.4 (H) 38.0 - 73.0 %    Lymph % 7.9 (L) 18.0 - 48.0 %    Mono % 14.0 4.0 - 15.0 %    Eosinophil % 1.8 0.0 - 8.0 %    Basophil % 0.6 0.0 - 1.9 %    Differential Method Automated    Protime-INR    Collection Time: 11/01/22  1:21 PM   Result Value Ref Range    Prothrombin Time 14.1 (H) 9.0 - 12.5 sec    INR 1.4 (H) 0.8 - 1.2   Meningitis - Encephalitis Panel, CSF    Collection Time: 11/01/22  2:51 PM   Result Value Ref Range    Eschericia coli K1 Negative Negative    Haemophilus influenzae Negative Negative    Listeria Monocytogenes Negative Negative    Neisseria meningtidis Negative Negative    Streptococcus agalactiae Negative Negative    Streptococcus pneumoniae Negative Negative    Cytomegalovirus Negative Negative    Enterovirus Negative Negative    Herpes Simplex Virus 1 Negative Negative    Herpes Simplex Virus 2 Negative Negative    Human Herpes Virus 6 Negative Negative    Human Parechovirus Negative Negative    Varicella Zoster Virus Negative Negative    Cryptococcus neoformans/gattii Negative Negative    Meningitis/Encheph Panel Interpretation Test Not Performed    Cell Count w/ Diff, CSF    Collection Time: 11/01/22  3:11 PM   Result Value Ref Range    Heme Aliquot 11.0 mL    Appearance, CSF Clear Clear    Color, CSF Colorless Colorless    WBC, CSF 4 0 - 5 /cu mm    RBC, CSF 11.5 (A) 0 /cu mm   Glucose, CSF    Collection Time: 11/01/22  3:11 PM   Result Value Ref Range    Glucose, CSF 62 40 - 70 mg/dL   Protein, CSF    Collection Time: 11/01/22  3:11 PM   Result Value Ref Range    Protein, CSF 32 15 - 40 mg/dL   VDRL, CSF    Collection Time: 11/01/22  3:11 PM   Result Value Ref Range    VDRL, CSF Negative Negative   Herpes Simplex (HSV) by PCR, CSF    Collection Time: 11/01/22  3:11 PM   Result Value Ref  Range    HSV1, PCR, CSF Negative Negative    HSV2, PCR, CSF Negative Negative   CSF culture    Collection Time: 11/01/22  3:13 PM    Specimen: CSF Tap, Tube 2; CSF (Spinal Fluid)   Result Value Ref Range    CSF CULTURE No Growth     Gram Stain Result No WBC's, epithelial cells or organisms seen     Gram Stain Result 11/01/2022  17:11 TN3    VANCOMYCIN, TROUGH    Collection Time: 11/01/22  4:39 PM   Result Value Ref Range    Vancomycin-Trough 8.2 (L) 10.0 - 22.0 ug/mL   Culture, Respiratory with Gram Stain    Collection Time: 11/01/22  5:44 PM    Specimen: Tracheal Aspirate; Respiratory   Result Value Ref Range    Respiratory Culture (A)      STAPHYLOCOCCUS AUREUS  Many  Normal respiratory annetta also present      Gram Stain (Respiratory) Many WBC's     Gram Stain (Respiratory) Few Gram positive cocci     Gram Stain (Respiratory) Few Gram negative rods     Gram Stain (Respiratory) Rare Gram positive rods     Gram Stain (Respiratory) Rare budding yeast        Susceptibility    Staphylococcus aureus - CULTURE, RESPIRATORY     Clindamycin <=0.5 Sensitive mcg/mL     Erythromycin >4 Resistant mcg/mL     Oxacillin <=0.25 Sensitive mcg/mL     Penicillin 2 Resistant mcg/mL     Trimeth/Sulfa <=0.5/9.5 Sensitive mcg/mL     Tetracycline <=4 Sensitive mcg/mL   Comprehensive Metabolic Panel    Collection Time: 11/02/22  4:52 AM   Result Value Ref Range    Sodium 140 136 - 145 mmol/L    Potassium 3.7 3.5 - 5.1 mmol/L    Chloride 110 95 - 110 mmol/L    CO2 21 (L) 23 - 29 mmol/L    Glucose 124 (H) 70 - 110 mg/dL    BUN 13 8 - 23 mg/dL    Creatinine 0.8 0.5 - 1.4 mg/dL    Calcium 8.7 8.7 - 10.5 mg/dL    Total Protein 5.6 (L) 6.0 - 8.4 g/dL    Albumin 2.4 (L) 3.5 - 5.2 g/dL    Total Bilirubin 0.4 0.1 - 1.0 mg/dL    Alkaline Phosphatase 83 55 - 135 U/L    AST 40 10 - 40 U/L    ALT 38 10 - 44 U/L    Anion Gap 9 8 - 16 mmol/L    eGFR >60 >60 mL/min/1.73 m^2   Magnesium    Collection Time: 11/02/22  4:52 AM   Result Value Ref Range     Magnesium 1.9 1.6 - 2.6 mg/dL   Phosphorus    Collection Time: 11/02/22  4:52 AM   Result Value Ref Range    Phosphorus 2.9 2.7 - 4.5 mg/dL   CBC Auto Differential    Collection Time: 11/02/22  4:53 AM   Result Value Ref Range    WBC 10.40 3.90 - 12.70 K/uL    RBC 3.69 (L) 4.60 - 6.20 M/uL    Hemoglobin 11.2 (L) 14.0 - 18.0 g/dL    Hematocrit 33.7 (L) 40.0 - 54.0 %    MCV 91 82 - 98 fL    MCH 30.4 27.0 - 31.0 pg    MCHC 33.2 32.0 - 36.0 g/dL    RDW 12.5 11.5 - 14.5 %    Platelets 228 150 - 450 K/uL    MPV 11.6 9.2 - 12.9 fL    Immature Granulocytes 0.7 (H) 0.0 - 0.5 %    Gran # (ANC) 7.9 (H) 1.8 - 7.7 K/uL    Immature Grans (Abs) 0.07 (H) 0.00 - 0.04 K/uL    Lymph # 0.6 (L) 1.0 - 4.8 K/uL    Mono # 1.5 (H) 0.3 - 1.0 K/uL    Eos # 0.2 0.0 - 0.5 K/uL    Baso # 0.04 0.00 - 0.20 K/uL    nRBC 0 0 /100 WBC    Gran % 76.0 (H) 38.0 - 73.0 %    Lymph % 6.1 (L) 18.0 - 48.0 %    Mono % 14.5 4.0 - 15.0 %    Eosinophil % 2.3 0.0 - 8.0 %    Basophil % 0.4 0.0 - 1.9 %    Differential Method Automated    Lactic Acid, Plasma    Collection Time: 11/02/22  8:00 AM   Result Value Ref Range    Lactate (Lactic Acid) 1.0 0.5 - 2.2 mmol/L   Procalcitonin    Collection Time: 11/02/22  8:00 AM   Result Value Ref Range    Procalcitonin 0.72 (H) <0.25 ng/mL   CK    Collection Time: 11/02/22  8:00 AM   Result Value Ref Range     (H) 20 - 200 U/L   ISTAT PROCEDURE    Collection Time: 11/02/22  8:22 AM   Result Value Ref Range    POC PH 7.436 7.35 - 7.45    POC PCO2 33.0 (L) 35 - 45 mmHg    POC PO2 67 (L) 80 - 100 mmHg    POC HCO3 22.2 (L) 24 - 28 mmol/L    POC BE -2 -2 to 2 mmol/L    POC SATURATED O2 94 (L) 95 - 100 %    POC TCO2 23 23 - 27 mmol/L    Sample ARTERIAL     Site RR     Allens Test Pass     DelSys Room Air     Mode SPONT    Aerobic culture    Collection Time: 11/02/22 12:00 PM    Specimen: Arm, Right; Wound   Result Value Ref Range    Aerobic Bacterial Culture No significant isolate    CBC Auto Differential    Collection Time:  11/03/22  8:04 AM   Result Value Ref Range    WBC 9.89 3.90 - 12.70 K/uL    RBC 3.70 (L) 4.60 - 6.20 M/uL    Hemoglobin 11.2 (L) 14.0 - 18.0 g/dL    Hematocrit 34.3 (L) 40.0 - 54.0 %    MCV 93 82 - 98 fL    MCH 30.3 27.0 - 31.0 pg    MCHC 32.7 32.0 - 36.0 g/dL    RDW 12.4 11.5 - 14.5 %    Platelets 197 150 - 450 K/uL    MPV 11.2 9.2 - 12.9 fL    Immature Granulocytes 1.4 (H) 0.0 - 0.5 %    Gran # (ANC) 6.9 1.8 - 7.7 K/uL    Immature Grans (Abs) 0.14 (H) 0.00 - 0.04 K/uL    Lymph # 1.1 1.0 - 4.8 K/uL    Mono # 1.4 (H) 0.3 - 1.0 K/uL    Eos # 0.3 0.0 - 0.5 K/uL    Baso # 0.03 0.00 - 0.20 K/uL    nRBC 0 0 /100 WBC    Gran % 70.0 38.0 - 73.0 %    Lymph % 10.8 (L) 18.0 - 48.0 %    Mono % 14.5 4.0 - 15.0 %    Eosinophil % 3.0 0.0 - 8.0 %    Basophil % 0.3 0.0 - 1.9 %    Differential Method Automated    Comprehensive Metabolic Panel    Collection Time: 11/03/22  8:04 AM   Result Value Ref Range    Sodium 137 136 - 145 mmol/L    Potassium 4.1 3.5 - 5.1 mmol/L    Chloride 109 95 - 110 mmol/L    CO2 22 (L) 23 - 29 mmol/L    Glucose 91 70 - 110 mg/dL    BUN 13 8 - 23 mg/dL    Creatinine 0.7 0.5 - 1.4 mg/dL    Calcium 8.8 8.7 - 10.5 mg/dL    Total Protein 5.4 (L) 6.0 - 8.4 g/dL    Albumin 2.3 (L) 3.5 - 5.2 g/dL    Total Bilirubin 0.7 0.1 - 1.0 mg/dL    Alkaline Phosphatase 71 55 - 135 U/L    AST 30 10 - 40 U/L    ALT 36 10 - 44 U/L    Anion Gap 6 (L) 8 - 16 mmol/L    eGFR >60 >60 mL/min/1.73 m^2   Magnesium    Collection Time: 11/03/22  8:04 AM   Result Value Ref Range    Magnesium 1.8 1.6 - 2.6 mg/dL   Phosphorus    Collection Time: 11/03/22  8:04 AM   Result Value Ref Range    Phosphorus 2.5 (L) 2.7 - 4.5 mg/dL   Procalcitonin    Collection Time: 11/04/22  3:58 AM   Result Value Ref Range    Procalcitonin 0.24 <0.25 ng/mL   CBC Auto Differential    Collection Time: 11/05/22  8:32 AM   Result Value Ref Range    WBC 7.23 3.90 - 12.70 K/uL    RBC 3.38 (L) 4.60 - 6.20 M/uL    Hemoglobin 11.2 (L) 14.0 - 18.0 g/dL    Hematocrit  31.4 (L) 40.0 - 54.0 %    MCV 93 82 - 98 fL    MCH 33.1 (H) 27.0 - 31.0 pg    MCHC 35.7 32.0 - 36.0 g/dL    RDW 12.3 11.5 - 14.5 %    Platelets 280 150 - 450 K/uL    MPV 11.3 9.2 - 12.9 fL    Immature Granulocytes 4.8 (H) 0.0 - 0.5 %    Gran # (ANC) 4.5 1.8 - 7.7 K/uL    Immature Grans (Abs) 0.35 (H) 0.00 - 0.04 K/uL    Lymph # 0.8 (L) 1.0 - 4.8 K/uL    Mono # 1.2 (H) 0.3 - 1.0 K/uL    Eos # 0.4 0.0 - 0.5 K/uL    Baso # 0.05 0.00 - 0.20 K/uL    nRBC 0 0 /100 WBC    Gran % 61.8 38.0 - 73.0 %    Lymph % 10.7 (L) 18.0 - 48.0 %    Mono % 16.3 (H) 4.0 - 15.0 %    Eosinophil % 5.7 0.0 - 8.0 %    Basophil % 0.7 0.0 - 1.9 %    Platelet Estimate Appears normal     Differential Method Automated    Basic Metabolic Panel    Collection Time: 11/05/22 10:35 AM   Result Value Ref Range    Sodium 139 136 - 145 mmol/L    Potassium 3.7 3.5 - 5.1 mmol/L    Chloride 108 95 - 110 mmol/L    CO2 22 (L) 23 - 29 mmol/L    Glucose 95 70 - 110 mg/dL    BUN 16 8 - 23 mg/dL    Creatinine 0.6 0.5 - 1.4 mg/dL    Calcium 8.5 (L) 8.7 - 10.5 mg/dL    Anion Gap 9 8 - 16 mmol/L    eGFR >60 >60 mL/min/1.73 m^2   VANCOMYCIN, TROUGH    Collection Time: 11/05/22 10:35 AM   Result Value Ref Range    Vancomycin-Trough 12.6 10.0 - 22.0 ug/mL   Valproic Acid    Collection Time: 11/05/22 10:35 AM   Result Value Ref Range    Valproic Acid Level 41.4 (L) 50.0 - 100.0 ug/mL   Ammonia    Collection Time: 11/05/22 10:35 AM   Result Value Ref Range    Ammonia 23 10 - 50 umol/L   CBC Auto Differential    Collection Time: 11/07/22  8:08 AM   Result Value Ref Range    WBC 10.73 3.90 - 12.70 K/uL    RBC 3.71 (L) 4.60 - 6.20 M/uL    Hemoglobin 11.3 (L) 14.0 - 18.0 g/dL    Hematocrit 33.7 (L) 40.0 - 54.0 %    MCV 91 82 - 98 fL    MCH 30.5 27.0 - 31.0 pg    MCHC 33.5 32.0 - 36.0 g/dL    RDW 12.5 11.5 - 14.5 %    Platelets 412 150 - 450 K/uL    MPV 10.9 9.2 - 12.9 fL    Immature Granulocytes 4.7 (H) 0.0 - 0.5 %    Gran # (ANC) 7.5 1.8 - 7.7 K/uL    Immature Grans (Abs) 0.50  (H) 0.00 - 0.04 K/uL    Lymph # 0.9 (L) 1.0 - 4.8 K/uL    Mono # 1.4 (H) 0.3 - 1.0 K/uL    Eos # 0.4 0.0 - 0.5 K/uL    Baso # 0.06 0.00 - 0.20 K/uL    nRBC 0 0 /100 WBC    Gran % 70.1 38.0 - 73.0 %    Lymph % 8.6 (L) 18.0 - 48.0 %    Mono % 12.7 4.0 - 15.0 %    Eosinophil % 3.3 0.0 - 8.0 %    Basophil % 0.6 0.0 - 1.9 %    Platelet Estimate Appears normal     Differential Method Automated    Basic Metabolic Panel    Collection Time: 11/07/22  8:08 AM   Result Value Ref Range    Sodium 140 136 - 145 mmol/L    Potassium 3.4 (L) 3.5 - 5.1 mmol/L    Chloride 110 95 - 110 mmol/L    CO2 23 23 - 29 mmol/L    Glucose 120 (H) 70 - 110 mg/dL    BUN 16 8 - 23 mg/dL    Creatinine 0.7 0.5 - 1.4 mg/dL    Calcium 8.5 (L) 8.7 - 10.5 mg/dL    Anion Gap 7 (L) 8 - 16 mmol/L    eGFR >60 >60 mL/min/1.73 m^2   CBC Auto Differential    Collection Time: 11/09/22  8:12 AM   Result Value Ref Range    WBC 7.22 3.90 - 12.70 K/uL    RBC 3.49 (L) 4.60 - 6.20 M/uL    Hemoglobin 10.5 (L) 14.0 - 18.0 g/dL    Hematocrit 31.8 (L) 40.0 - 54.0 %    MCV 91 82 - 98 fL    MCH 30.1 27.0 - 31.0 pg    MCHC 33.0 32.0 - 36.0 g/dL    RDW 12.7 11.5 - 14.5 %    Platelets 411 150 - 450 K/uL    MPV 10.8 9.2 - 12.9 fL    Immature Granulocytes 3.0 (H) 0.0 - 0.5 %    Gran # (ANC) 5.2 1.8 - 7.7 K/uL    Immature Grans (Abs) 0.22 (H) 0.00 - 0.04 K/uL    Lymph # 0.6 (L) 1.0 - 4.8 K/uL    Mono # 0.9 0.3 - 1.0 K/uL    Eos # 0.2 0.0 - 0.5 K/uL    Baso # 0.05 0.00 - 0.20 K/uL    nRBC 0 0 /100 WBC    Gran % 72.2 38.0 - 73.0 %    Lymph % 8.2 (L) 18.0 - 48.0 %    Mono % 12.7 4.0 - 15.0 %    Eosinophil % 3.2 0.0 - 8.0 %    Basophil % 0.7 0.0 - 1.9 %    Differential Method Automated    Basic Metabolic Panel    Collection Time: 11/09/22  8:12 AM   Result Value Ref Range    Sodium 141 136 - 145 mmol/L    Potassium 3.2 (L) 3.5 - 5.1 mmol/L    Chloride 111 (H) 95 - 110 mmol/L    CO2 24 23 - 29 mmol/L    Glucose 114 (H) 70 - 110 mg/dL    BUN 16 8 - 23 mg/dL    Creatinine 0.6 0.5 - 1.4  mg/dL    Calcium 8.3 (L) 8.7 - 10.5 mg/dL    Anion Gap 6 (L) 8 - 16 mmol/L    eGFR >60 >60 mL/min/1.73 m^2             Imaging Results           CT Abdomen Pelvis  Without Contrast (Final result)  Result time 10/29/22 16:26:14    Final result by Luana Horner MD (10/29/22 16:26:14)                 Impression:      Study limited by motion, demonstrating a complicated cyst or solid mass extending from the lower pole of the left kidney.  This should be worked up further with a study with IV contrast when the patient is able to remain still.    Nonobstructing right renal calculus.    Constipation.    This report was flagged in Epic as abnormal.      Electronically signed by: Luana Horner  Date:    10/29/2022  Time:    16:26             Narrative:    EXAMINATION:  CT ABDOMEN PELVIS WITHOUT CONTRAST    CLINICAL HISTORY:  Abdominal abscess/infection suspected; failure to thrive    TECHNIQUE:  Low dose axial images, sagittal and coronal reformations were obtained from the lung bases to the pubic symphysis.  Neither oral nor IV contrast was administered.  The patient had a difficult time remaining still for image acquisition    COMPARISON:  None    FINDINGS:  Consolidation in the dependent portions of the lung bases is likely due to atelectasis.    Limited noncontrast images of the liver, gallbladder, spleen, adrenal glands, pancreas are grossly normal.  There is a nonobstructing 5 mm right lower pole renal calculus.  There is a 3.2 x 3.5 cm structure with a density of 46 Hounsfield units extending from the lower pole of the left kidney series 4, image 99.  This may be a complex cyst or solid mass.  No adenopathy is noted.    The aorta is normal in caliber.    There is generalized fecal stasis throughout the colon.  No evidence of a bowel obstruction.  Urinary bladder unremarkable.  There is no free intraperitoneal fluid or air.  No acute findings in the bones.                               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  This is likely an incidental finding.  It produces no mass effect/edema on the adjacent brain.  Otherwise, essentially negative study, aside from atrophy.      Electronically signed by: Luana Horner  Date:    10/29/2022  Time:    12:01             Narrative:    EXAMINATION:  CT HEAD WITHOUT CONTRAST    CLINICAL HISTORY:  Mental status change, unknown cause;    TECHNIQUE:  Low dose axial images were obtained through the head.  Coronal and sagittal reformations were also performed. Contrast was not administered.    COMPARISON:  None    FINDINGS:  The study is mildly limited by motion.  There is no intra or extra-axial hemorrhage.  No mass effect, edema or midline shift is present.  Mild generalized atrophy, particularly central atrophy is noted.  There is no skull fracture.  The visualized paranasal sinuses are clear.    There is 1.6 cm calcified mass in the floor of the right anterior cranial fossa, extending from the petrous temporal bone.  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  There is no edema in the adjacent temporal lobe.                               X-Ray Chest AP Portable (Final result)  Result time 10/29/22 11:35:49    Final result by Luana Horner MD (10/29/22 11:35:49)                 Impression:      No acute cardiopulmonary disease.      Electronically signed by: Luana Horner  Date:    10/29/2022  Time:    11:35             Narrative:    EXAMINATION:  XR CHEST AP PORTABLE    CLINICAL HISTORY:  Altered mental status, unspecified    TECHNIQUE:  Single frontal view of the chest was performed.    COMPARISON:  05/28/2018    FINDINGS:  The patient is mildly rotated to the right.  The heart size is normal.  The aorta is  ectatic.  The lungs are clear.  There is no pleural effusion or acute bony abnormality.                                  MRI BRAIN W WO CONTRAST     CLINICAL HISTORY:  Mental status change, unknown cause;.     TECHNIQUE:  Multiplanar multisequence MR imaging of the brain was performed before and after the administration of 5 mL Gadavist intravenous contrast.  Diffusion-weighted imaging was performed.  ADC map was generated.     COMPARISON:  CTA head and neck dated 10/31/2022, head CT dated 10/29/2022     FINDINGS:  Intracranial compartment:     The study is moderate to markedly motion degraded which limits evaluation.  There is no acute intracranial hemorrhage.  There are no regions of restricted diffusion to suggest acute infarction.  There is generalized volume loss with more focal volume loss in the anterior temporal lobes, left greater than right.  There is mild nonspecific white matter change.  Comparison CTs demonstrate dense calcification in the posterolateral aspect of the right middle cranial fossa.  There is no definite associated enhancement.  This may represent extensive dural calcification or possible osteoma.  There is no hydrocephalus or midline shift.  The basilar cisterns are open.  Flow voids suggesting patency are present in the major vessels at the base of the brain.  The cerebellar tonsils are normal position.  The patient has had prior left lens replacement surgery.  The orbits are otherwise grossly normal.     Skull/extracranial contents:     Baseline marrow signal is normal.  The paranasal sinuses and mastoid air cells are grossly clear.     Impression:     1. Moderate to marked patient motion slightly limits evaluation but there is no acute infarction.  There is no acute intracranial hemorrhage or parenchymal mass.  There is generalized volume loss.  There is also mild nonspecific white matter change.  2. There is calcification in the posterolateral aspect of the right middle cranial fossa  which is better demonstrated on comparison CT.  There is no definite associated enhancement as would be expected in a meningioma.  This could potentially represent very prominent incidental dural calcification or possible osteoma.  This is not acute.                CTA HEAD AND NECK (XPD)     CLINICAL HISTORY:  CNS vasculitis, known or suspected;     TECHNIQUE:  Non contrast low dose axial images were obtained through the head. CT angiogram was performed from the level of the josiane to the top of the head following the IV administration of 75mL of Omnipaque 350.   Sagittal and coronal reconstructions and maximum intensity projection reconstructions were performed. Arterial stenosis percentages are based on NASCET measurement criteria.     COMPARISON:  CT head 10/29/2022     FINDINGS:  Brain:     No evidence of acute intracranial hemorrhage.     Mild prominence of the ventricles and sulci compatible with generalized cerebral volume loss.  No hydrocephalus.     Mild scattered hypoattenuation within the supratentorial white matter, nonspecific but probably reflecting sequelae of chronic microvascular ischemic change.  Stable-appearing 1.6 cm dense nodular calcification along the posterolateral floor of the right middle cranial fossa, nonspecific and may reflect an osteoma or calcified meningioma.  There is no significant mass effect, midline shift, or extra-axial fluid collection. Gray-white matter differentiation is within normal limits without evidence of an acute major vascular territory infarct.     No abnormal intracranial enhancement.     Left lens replacement noted.  The paranasal sinuses are normal.  The visualized portions of the mastoids are unremarkable. No acute calvarial fracture.     Extracranial: Study is mildly degraded by motion artifact.     Aorta and great vessels: Left-sided aortic arch with normal configuration. No evidence of stenosis of the origins of the great vessels from the  arch.     Subclavian arteries: The subclavian arteries are without hemodynamically significant stenosis.     Right carotid: The right common carotid artery is without significant stenosis. Mild atherosclerotic plaque at the carotid bifurcation.  The right internal carotid artery is without significant stenosis.     Left carotid: The left common carotid artery is without significant stenosis. Mild atherosclerotic plaque at the carotid bifurcation with approximately 40% stenosis of the left internal carotid artery.     Extracranial vertebral arteries: Left vertebral artery is dominant.  The vertebral arteries are without significant stenosis to the skull base.     Intracranial:     Anterior circulation: The course and caliber the internal carotid arteries are normal.  No areas of significant atherosclerotic narrowing or filling defects are identified.  The middle cerebral arteries are normal.  The right anterior cerebral artery A1 segment is hypoplastic, a developmental variant.  Left anterior cerebral artery A1 segment is normal.  Anterior communicating artery is present.  Bilateral distal anterior cerebral arteries appear patent.     Posterior circulation: Hypoplastic right posterior cerebral artery P1 segment, with the P2 segment fed by a dominant right posterior communicating artery, a normal developmental variant.  Left posterior cerebral artery is normal.  Left vertebral artery is dominant.  Right vertebral artery V4 segment is diminutive in caliber, likely representing a normal anatomical variant.  The basilar artery is normal.     No evidence of aneurysm or arteriovenous malformation.     Dural venous sinuses are patent.     Other:     Ill-defined soft tissue stranding and distortion of the normal left-sided neck fascial planes may reflect sequelae of postsurgical change.     The visualized portions of the lung apices demonstrate mild biapical nonspecific fibronodular pleuroparenchymal scarring.  There is a  2.3 cm ground-glass opacity abutting the pleural margin within the posterior right lung apex.  Additional ill-defined 2.7 cm part solid opacity along the posterior right upper lobe abutting the major fissure.     There are degenerative spine changes. No concerning osseous lesions identified.     Impression:     1. No evidence of an acute intracranial abnormality or interval detrimental change as compared to the prior study on 10/29/2022.  2. Stable dense nodular calcification along the floor the right middle cranial fossa, nonspecific and may reflect an osteoma or calcified meningioma.  No significant mass effect or parenchymal edema.  3. No high-grade stenosis, major branch occlusion, or aneurysm identified within the head and neck vasculature.  4. Right upper lobe ground-glass opacities in patchy consolidation, nonspecific and may reflect sequelae of small airways inflammation or infection.  5. Ill-defined soft tissue stranding and distortion of the normal left-sided neck fascial plane, possibly postsurgical.          Pending Diagnostic Studies:     None         Medications:  Reconciled Home Medications:      Medication List      STOP taking these medications    ALPRAZolam 0.5 MG Tb24  Commonly known as: XANAX XR     amLODIPine 10 MG tablet  Commonly known as: NORVASC     aspirin 81 MG EC tablet  Commonly known as: ECOTRIN     atorvastatin 40 MG tablet  Commonly known as: LIPITOR     FLUoxetine 40 MG capsule     levothyroxine 88 MCG tablet  Commonly known as: SYNTHROID     OXcarbazepine 300 MG Tab  Commonly known as: TRILEPTAL     vitamin D 1000 units Tab  Commonly known as: VITAMIN D3            Indwelling Lines/Drains at time of discharge:   Lines/Drains/Airways     none                Time spent on the discharge of patient: 35 minutes         Jefferson Mehta MD  Department of Hospital Medicine  [facility]

## 2022-11-11 NOTE — CARE UPDATE
11/10/22 1933   Patient Assessment/Suction   Level of Consciousness (AVPU) responds to voice   Respiratory Effort Normal;Unlabored   PRE-TX-O2   O2 Device (Oxygen Therapy) room air   SpO2 95 %   Pulse Oximetry Type Intermittent   Pulse 64   Resp 16   Temp 96.5 °F (35.8 °C)   /81

## 2022-11-11 NOTE — PROGRESS NOTES
"[facility]  Palliative Medicine  Progress Note    Patient Name: Ronn Caballero Jr.  MRN: [id number]  Admission Date: 10/29/2022  Hospital Length of Stay: 13 days  Code Status: DNR   Attending Provider: Jefferson Mehta MD  Consulting Provider: Yung Posada MD  Primary Care Physician: JELLY Decker  Principal Problem:Dementia with behavioral disturbance    Patient information was obtained from patient, spouse/SO, relative(s) and past medical records.      Assessment/Plan:     Goals of care, counseling/discussion  I met with Mr. Caballero at bedside. He lacks capacity for complex medical decision making. I met with his wife and sister in law at bedside.    They are content with hospice and working towards discharge with hospice at home. They are ready for discharge today.    Ms. Caballero expressed appropriate emotion today. I took time to attend this emotion and provide reassurance.    Prior to discharge would suggest a dose of morphine and ativan given his prolonged ride home.    Please call with questions.        I will follow-up with patient. Please contact us if you have any additional questions.    Subjective:     Chief Complaint:   Chief Complaint   Patient presents with    Dementia     From [place]        HPI:   Per admission H&P:    "73 yo male with h/o thyroid cancer, frontal temporal lobe dementia, HTN, HLP, depression and anxiety transferred from [place] behavioral inpatient service due to 6 days of failure to thrive. It is reported from the facility that he was PEC/CEC (expires 11/3/22) due to combative behavior at home. History comes from the patient's spouse and sister-in -law at bedside. Patient follows a neurologist in [place] and spouse reports recent change in medications include increase of zyprexa from 2.5mg to 10mg BID. He was also started on gabapentin TID. Spouse denies recent fever, chills, cough, D/N/V.  Per the MAR from [place] he received Haldol x4 IM, benadryl " "and ativan IV x2. The facility reports he was not eating or drinking and no witnessed episodes of diarrhea or vomiting. On our workup he has Na153, elevated  AST, hematuria, WBC 17k with 2% bands, lactic acid 1.2. No source of infection on UA or chest xray. On exam he does show discomfort in RUQ and midepigastrium. Abd CT and lipase pending. Troponin mildly elevated 0.056 with some EKG changes. "     Since admission his work up has been largely unrevealing. He remains altered and combative when he is not sleeping. Neurology planning for LP. At this point his prognosis is guarded and potentially quite poor if his mentation does not improve. I have been asked to assist with goals of care.      Hospital Course:  No notes on file    Interval History: No major changes    Past Medical History:   Diagnosis Date    Cancer     Dementia     Depression     Hypertension        Past Surgical History:   Procedure Laterality Date    Lymph node Ca removed         Review of patient's allergies indicates:  No Known Allergies    Medications:  Continuous Infusions:   Amino acid 5% - dextrose 15% (CLINIMIX-E) solution with additives.  CENTRAL LINE ONLY (1395 mOsm/L) 70 mL/hr at 11/11/22 0608    lactated ringers 25 mL/hr at 11/11/22 0608     Scheduled Meds:   enoxaparin  40 mg Subcutaneous Daily    fat emulsion 20%  250 mL Intravenous Daily    levothyroxine  44 mcg Intravenous Daily    scopolamine  1 patch Transdermal Q3 Days    senna-docusate 8.6-50 mg  1 tablet Oral BID    sodium chloride 0.9%  10 mL Intravenous Q6H    valproate sodium (DEPACON) IVPB  250 mg Intravenous Q8H     PRN Meds:acetaminophen, aluminum-magnesium hydroxide-simethicone, bisacodyL, dextrose 10%, dextrose 10%, glucagon (human recombinant), glucose, glucose, labetaloL, lorazepam, morphine, naloxone, ondansetron, sodium chloride 0.9%, Flushing PICC Protocol **AND** sodium chloride 0.9% **AND** sodium chloride 0.9%    Family History    None       Tobacco " Use    Smoking status: Not on file    Smokeless tobacco: Not on file   Substance and Sexual Activity    Alcohol use: Not on file    Drug use: Not on file    Sexual activity: Not on file       Review of Systems   Unable to perform ROS: Mental status change   Objective:     Vital Signs (Most Recent):  Temp: 97.3 °F (36.3 °C) (11/11/22 0717)  Pulse: 78 (11/11/22 0830)  Resp: 16 (11/11/22 0830)  BP: 133/74 (11/11/22 0717)  SpO2: (!) 94 % (11/11/22 0830)   Vital Signs (24h Range):  Temp:  [96.3 °F (35.7 °C)-98.1 °F (36.7 °C)] 97.3 °F (36.3 °C)  Pulse:  [56-87] 78  Resp:  [15-20] 16  SpO2:  [94 %-99 %] 94 %  BP: (126-177)/(74-81) 133/74     Weight: 56.7 kg (125 lb)  Body mass index is 19.58 kg/m².    Physical Exam  Constitutional:       General: He is not in acute distress.     Appearance: He is ill-appearing. He is not toxic-appearing.   HENT:      Head: Normocephalic and atraumatic.      Right Ear: External ear normal.      Left Ear: External ear normal.      Mouth/Throat:      Mouth: Mucous membranes are dry.      Pharynx: Oropharynx is clear. No oropharyngeal exudate or posterior oropharyngeal erythema.   Eyes:      General:         Right eye: No discharge.         Left eye: No discharge.      Extraocular Movements: Extraocular movements intact.   Cardiovascular:      Rate and Rhythm: Normal rate and regular rhythm.   Pulmonary:      Effort: Pulmonary effort is normal. No respiratory distress.      Breath sounds: No wheezing.   Abdominal:      General: There is no distension.      Palpations: Abdomen is soft.   Musculoskeletal:         General: No swelling or tenderness.      Cervical back: Neck supple. No tenderness.   Skin:     General: Skin is warm and dry.   Neurological:      Comments: obtunded       Review of Symptoms      Symptom Assessment (ESAS 0-10 Scale)  Pain:  0  Dyspnea:  0  Anxiety:  0  Nausea:  0  Depression:  0  Anorexia:  0  Fatigue:  0  Insomnia:  0  Restlessness:  0  Agitation:  0 due to  Mental status change         Pain Assessment in Advanced Demential Scale (PAINAD)   Breathing - Independent of vocalization:  0  Negative vocalization:  0  Facial expression:  0  Body language:  1  Consolability:  0  Total:  1    Performance Status:  10    Living Arrangements:  Lives with spouse     Time-Based Charting:  Yes  Chart Review: 10 minutes  Face to Face: 25 minutes  Coordination of Care: 5 minutes    Total Time Spent: 40 minutes      Advance Care Planning   Advance Directives:   Do Not Resuscitate Status: Yes      Decision Making:  Family answered questions  Goals of Care: What is most important right now is to focus on comfort and QOL . Accordingly, we have decided that the best plan to meet the patient's goals includes enrolling in hospice care.       Significant Labs: BMP:   No results for input(s): GLU, NA, K, CL, CO2, BUN, CREATININE, CALCIUM, MG in the last 48 hours.    CBC:   No results for input(s): WBC, HGB, HCT, PLT in the last 48 hours.    CBC:   Recent Labs   Lab 11/09/22  0812   WBC 7.22   HGB 10.5*   HCT 31.8*   MCV 91          BMP:  No results for input(s): GLU, NA, K, CL, CO2, BUN, CREATININE, CALCIUM, MG in the last 24 hours.    LFT:  Lab Results   Component Value Date    AST 30 11/03/2022    ALKPHOS 71 11/03/2022    BILITOT 0.7 11/03/2022     Albumin:   Albumin   Date Value Ref Range Status   11/03/2022 2.3 (L) 3.5 - 5.2 g/dL Final     Protein:   Total Protein   Date Value Ref Range Status   11/03/2022 5.4 (L) 6.0 - 8.4 g/dL Final     Lactic acid:   Lab Results   Component Value Date    LACTATE 1.0 11/02/2022    LACTATE 1.2 10/29/2022       Significant Imaging: I have reviewed all pertinent imaging results/findings within the past 24 hours.        Yung Posada MD  Palliative Medicine  [facility]

## 2022-11-11 NOTE — PLAN OF CARE
DC orders hard faxed to [facility]       11/11/22 8770   Post-Acute Status   Post-Acute Authorization Hospice   Hospice Status Pending post acute provider review/more information requested

## 2022-11-11 NOTE — NURSING
Pt cleared for dc. PICC line removed with cath intact . Pressure held for 10 min and no bleeding at site. Pressure dsg applied. Restraints removed. [facility] Cm aware, wife notified pt leaving now per [facility].

## 2022-11-11 NOTE — PLAN OF CARE
Pt arousable to voice/gentle touch, DELORES orientation D/T incomprehensible speech. VSS. Pt remained free of falls this shift. Rei wrist restraints maintained. Medications administered as ordered.  PICC CDI. Hourly rounding completed. Avasys at bedside for safety monitoring. Respiratory notified to suction, as pt unable to clear secretions. No further needs noted at this time.       Problem: Restraint, Nonbehavioral (Nonviolent)  Goal: Absence of Harm or Injury  Outcome: Ongoing, Progressing     Problem: Adult Inpatient Plan of Care  Goal: Plan of Care Review  Outcome: Ongoing, Progressing     Problem: Adult Inpatient Plan of Care  Goal: Optimal Comfort and Wellbeing  Outcome: Ongoing, Progressing     Problem: Skin Injury Risk Increased  Goal: Skin Health and Integrity  Outcome: Ongoing, Progressing

## 2022-11-11 NOTE — PLAN OF CARE
Patient progressing towards discharge.    Patient had no acute events noted. Patient remains in restraints.  Patient remains agitated overnight.  Agitation improves with morphine.  Patient voiding.  IV fluids, TPN and lipids continued as ordered.  Plan for hospice later today.  Discussed POC with patient and family with verbalization of understanding.    Will continue to monitor.

## 2022-11-11 NOTE — SUBJECTIVE & OBJECTIVE
Interval History: No major changes    Past Medical History:   Diagnosis Date    Cancer     Dementia     Depression     Hypertension        Past Surgical History:   Procedure Laterality Date    Lymph node Ca removed         Review of patient's allergies indicates:  No Known Allergies    Medications:  Continuous Infusions:   Amino acid 5% - dextrose 15% (CLINIMIX-E) solution with additives.  CENTRAL LINE ONLY (1395 mOsm/L) 70 mL/hr at 11/11/22 0608    lactated ringers 25 mL/hr at 11/11/22 0608     Scheduled Meds:   enoxaparin  40 mg Subcutaneous Daily    fat emulsion 20%  250 mL Intravenous Daily    levothyroxine  44 mcg Intravenous Daily    scopolamine  1 patch Transdermal Q3 Days    senna-docusate 8.6-50 mg  1 tablet Oral BID    sodium chloride 0.9%  10 mL Intravenous Q6H    valproate sodium (DEPACON) IVPB  250 mg Intravenous Q8H     PRN Meds:acetaminophen, aluminum-magnesium hydroxide-simethicone, bisacodyL, dextrose 10%, dextrose 10%, glucagon (human recombinant), glucose, glucose, labetaloL, lorazepam, morphine, naloxone, ondansetron, sodium chloride 0.9%, Flushing PICC Protocol **AND** sodium chloride 0.9% **AND** sodium chloride 0.9%    Family History    None       Tobacco Use    Smoking status: Not on file    Smokeless tobacco: Not on file   Substance and Sexual Activity    Alcohol use: Not on file    Drug use: Not on file    Sexual activity: Not on file       Review of Systems   Unable to perform ROS: Mental status change   Objective:     Vital Signs (Most Recent):  Temp: 97.3 °F (36.3 °C) (11/11/22 0717)  Pulse: 78 (11/11/22 0830)  Resp: 16 (11/11/22 0830)  BP: 133/74 (11/11/22 0717)  SpO2: (!) 94 % (11/11/22 0830)   Vital Signs (24h Range):  Temp:  [96.3 °F (35.7 °C)-98.1 °F (36.7 °C)] 97.3 °F (36.3 °C)  Pulse:  [56-87] 78  Resp:  [15-20] 16  SpO2:  [94 %-99 %] 94 %  BP: (126-177)/(74-81) 133/74     Weight: 56.7 kg (125 lb)  Body mass index is 19.58 kg/m².    Physical Exam  Constitutional:       General: He  is not in acute distress.     Appearance: He is ill-appearing. He is not toxic-appearing.   HENT:      Head: Normocephalic and atraumatic.      Right Ear: External ear normal.      Left Ear: External ear normal.      Mouth/Throat:      Mouth: Mucous membranes are dry.      Pharynx: Oropharynx is clear. No oropharyngeal exudate or posterior oropharyngeal erythema.   Eyes:      General:         Right eye: No discharge.         Left eye: No discharge.      Extraocular Movements: Extraocular movements intact.   Cardiovascular:      Rate and Rhythm: Normal rate and regular rhythm.   Pulmonary:      Effort: Pulmonary effort is normal. No respiratory distress.      Breath sounds: No wheezing.   Abdominal:      General: There is no distension.      Palpations: Abdomen is soft.   Musculoskeletal:         General: No swelling or tenderness.      Cervical back: Neck supple. No tenderness.   Skin:     General: Skin is warm and dry.   Neurological:      Comments: obtunded       Review of Symptoms      Symptom Assessment (ESAS 0-10 Scale)  Pain:  0  Dyspnea:  0  Anxiety:  0  Nausea:  0  Depression:  0  Anorexia:  0  Fatigue:  0  Insomnia:  0  Restlessness:  0  Agitation:  0 due to Mental status change         Pain Assessment in Advanced Demential Scale (PAINAD)   Breathing - Independent of vocalization:  0  Negative vocalization:  0  Facial expression:  0  Body language:  1  Consolability:  0  Total:  1    Performance Status:  10    Living Arrangements:  Lives with spouse     Time-Based Charting:  Yes  Chart Review: 10 minutes  Face to Face: 25 minutes  Coordination of Care: 5 minutes    Total Time Spent: 40 minutes      Advance Care Planning   Advance Directives:   Do Not Resuscitate Status: Yes      Decision Making:  Family answered questions  Goals of Care: What is most important right now is to focus on comfort and QOL . Accordingly, we have decided that the best plan to meet the patient's goals includes enrolling in hospice  care.       Significant Labs: BMP:   No results for input(s): GLU, NA, K, CL, CO2, BUN, CREATININE, CALCIUM, MG in the last 48 hours.    CBC:   No results for input(s): WBC, HGB, HCT, PLT in the last 48 hours.    CBC:   Recent Labs   Lab 11/09/22  0812   WBC 7.22   HGB 10.5*   HCT 31.8*   MCV 91          BMP:  No results for input(s): GLU, NA, K, CL, CO2, BUN, CREATININE, CALCIUM, MG in the last 24 hours.    LFT:  Lab Results   Component Value Date    AST 30 11/03/2022    ALKPHOS 71 11/03/2022    BILITOT 0.7 11/03/2022     Albumin:   Albumin   Date Value Ref Range Status   11/03/2022 2.3 (L) 3.5 - 5.2 g/dL Final     Protein:   Total Protein   Date Value Ref Range Status   11/03/2022 5.4 (L) 6.0 - 8.4 g/dL Final     Lactic acid:   Lab Results   Component Value Date    LACTATE 1.0 11/02/2022    LACTATE 1.2 10/29/2022       Significant Imaging: I have reviewed all pertinent imaging results/findings within the past 24 hours.

## 2022-11-11 NOTE — PLAN OF CARE
Problem: Fall Injury Risk  Goal: Absence of Fall and Fall-Related Injury  Outcome: Met     Problem: Restraint, Nonbehavioral (Nonviolent)  Goal: Absence of Harm or Injury  Outcome: Met     Problem: Adult Inpatient Plan of Care  Goal: Plan of Care Review  Outcome: Met  Goal: Patient-Specific Goal (Individualized)  Outcome: Met  Goal: Absence of Hospital-Acquired Illness or Injury  Outcome: Met  Goal: Optimal Comfort and Wellbeing  Outcome: Met  Goal: Readiness for Transition of Care  Outcome: Met     Problem: Impaired Wound Healing  Goal: Optimal Wound Healing  Outcome: Met     Problem: Skin Injury Risk Increased  Goal: Skin Health and Integrity  Outcome: Met     Problem: Coping Ineffective  Goal: Effective Coping  Outcome: Met     Problem: Restraint, Behavioral (Acute Care)  Goal: Absence of Harm or Injury  Outcome: Met     Problem: Malnutrition  Goal: Improved Nutritional Intake  Outcome: Met     Problem: Infection  Goal: Absence of Infection Signs and Symptoms  Outcome: Met

## 2022-11-11 NOTE — PLAN OF CARE
Received call back from Mariana with [facility]. Confirmed everything is on schedule for pt to DC today. Requested for me to arrange transportation for 12pm       11/11/22 1048   Post-Acute Status   Post-Acute Authorization Hospice   Hospice Status Set-up Complete/Auth obtained

## 2022-11-14 ENCOUNTER — TELEPHONE (OUTPATIENT)
Dept: MEDSURG UNIT | Facility: HOSPITAL | Age: 72
End: 2022-11-14
Payer: MEDICARE

## 2023-01-07 NOTE — CONSULTS
Consulted for wounds present on admit which are noted with worsening. Patient is resting quietly at the assessment time. Removed restraints to assess skin beneath the restraints to both arms with skin intact. Applied Foam dressings to bilateral wrists to protect skin from the wrist restraints. Right a/c is noted with purulent drainage and obtained a culture specimen of this site as the wound is red and swollen with purulent drainage. The skin tear to the right elbow is a total loss skin tear but is healing. There is an abrasion noted the the right forearm as well. Cleaned areas with wound cleanser and patted dry. Applied cut piece of urgotul and covered the right elbow, right a/c and right forearm abrasion area then applied foam dressings to all sites. Triad to be used on the buttocks every shift. Wound care to follow this patient throughout his hospital stay.      Right elbow/ forearm    Redness and swelling right elbow    Right antecubital wound   Initial (On Arrival)

## 2023-03-21 NOTE — ASSESSMENT & PLAN NOTE
Pt ambulated in hallway. Stated he felt a lot better than he did upon arrival but stated he felt slightly unsteady. Stated not \"driving ready\". Was able to ambulate without assistance. Overall, he was more alert than he was upon arrival.    Resume synthroid - IV due to NPo status   FT4 - normal

## 2024-08-22 NOTE — ASSESSMENT & PLAN NOTE
Leukocytosis - unknown etiology  LA 1.2 and no fever  UA - no signs of infections  CXR - clear   CT of abdomen  Trend CBC      Rounding Completed.    Plan of Care reviewed. Waiting for Rm. 8602 to be clean and ready..  Elimination needs assessed.  Provided information regarding bed assigned.    Bed is locked and in lowest position. Call light within reach.

## 2024-10-17 NOTE — ASSESSMENT & PLAN NOTE
If passes swallow eval, resume home norvasc  IV labetalol PRN      Screening mammogram order placed.     Outgoing call to patient. Informed mammogram ordered.